# Patient Record
Sex: FEMALE | Race: WHITE | NOT HISPANIC OR LATINO | Employment: FULL TIME | URBAN - METROPOLITAN AREA
[De-identification: names, ages, dates, MRNs, and addresses within clinical notes are randomized per-mention and may not be internally consistent; named-entity substitution may affect disease eponyms.]

---

## 2022-02-24 ENCOUNTER — OFFICE VISIT (OUTPATIENT)
Dept: FAMILY MEDICINE CLINIC | Facility: CLINIC | Age: 47
End: 2022-02-24

## 2022-02-24 ENCOUNTER — LAB (OUTPATIENT)
Dept: LAB | Facility: HOSPITAL | Age: 47
End: 2022-02-24

## 2022-02-24 VITALS
WEIGHT: 223.4 LBS | BODY MASS INDEX: 35.06 KG/M2 | SYSTOLIC BLOOD PRESSURE: 124 MMHG | HEART RATE: 73 BPM | OXYGEN SATURATION: 95 % | HEIGHT: 67 IN | DIASTOLIC BLOOD PRESSURE: 78 MMHG | TEMPERATURE: 97.7 F

## 2022-02-24 DIAGNOSIS — J30.1 ALLERGIC RHINITIS DUE TO POLLEN, UNSPECIFIED SEASONALITY: ICD-10-CM

## 2022-02-24 DIAGNOSIS — I10 PRIMARY HYPERTENSION: ICD-10-CM

## 2022-02-24 DIAGNOSIS — E06.3 HYPOTHYROIDISM DUE TO HASHIMOTO'S THYROIDITIS: ICD-10-CM

## 2022-02-24 DIAGNOSIS — F41.9 ANXIETY: ICD-10-CM

## 2022-02-24 DIAGNOSIS — F33.41 RECURRENT MAJOR DEPRESSIVE DISORDER, IN PARTIAL REMISSION: ICD-10-CM

## 2022-02-24 DIAGNOSIS — Z12.12 ENCOUNTER FOR COLORECTAL CANCER SCREENING: ICD-10-CM

## 2022-02-24 DIAGNOSIS — K21.9 GASTROESOPHAGEAL REFLUX DISEASE WITHOUT ESOPHAGITIS: ICD-10-CM

## 2022-02-24 DIAGNOSIS — Z12.31 ENCOUNTER FOR SCREENING MAMMOGRAM FOR MALIGNANT NEOPLASM OF BREAST: ICD-10-CM

## 2022-02-24 DIAGNOSIS — E03.8 HYPOTHYROIDISM DUE TO HASHIMOTO'S THYROIDITIS: ICD-10-CM

## 2022-02-24 DIAGNOSIS — N93.9 ABNORMAL UTERINE BLEEDING: ICD-10-CM

## 2022-02-24 DIAGNOSIS — Z00.00 ANNUAL PHYSICAL EXAM: Primary | ICD-10-CM

## 2022-02-24 DIAGNOSIS — Z12.11 ENCOUNTER FOR COLORECTAL CANCER SCREENING: ICD-10-CM

## 2022-02-24 PROCEDURE — 99214 OFFICE O/P EST MOD 30 MIN: CPT | Performed by: FAMILY MEDICINE

## 2022-02-24 PROCEDURE — 99386 PREV VISIT NEW AGE 40-64: CPT | Performed by: FAMILY MEDICINE

## 2022-02-24 RX ORDER — HYDROCHLOROTHIAZIDE 12.5 MG/1
12.5 TABLET ORAL DAILY
Qty: 90 TABLET | Refills: 1 | Status: SHIPPED | OUTPATIENT
Start: 2022-02-24 | End: 2023-01-18

## 2022-02-24 RX ORDER — FEXOFENADINE HCL 180 MG/1
180 TABLET ORAL DAILY
Qty: 90 TABLET | Refills: 3 | Status: SHIPPED | OUTPATIENT
Start: 2022-02-24 | End: 2023-01-18

## 2022-02-24 RX ORDER — LORAZEPAM 0.5 MG/1
TABLET ORAL
COMMUNITY
End: 2022-02-24 | Stop reason: ALTCHOICE

## 2022-02-24 RX ORDER — LEVOTHYROXINE SODIUM 112 UG/1
112 TABLET ORAL DAILY
Qty: 90 TABLET | Refills: 3 | Status: SHIPPED | OUTPATIENT
Start: 2022-02-24 | End: 2023-01-18

## 2022-02-24 RX ORDER — FAMOTIDINE 20 MG/1
20 TABLET, FILM COATED ORAL 2 TIMES DAILY
Qty: 180 TABLET | Refills: 3 | Status: SHIPPED | OUTPATIENT
Start: 2022-02-24 | End: 2023-01-18

## 2022-02-24 RX ORDER — FEXOFENADINE HCL 180 MG/1
TABLET ORAL
COMMUNITY
End: 2022-02-24 | Stop reason: SDUPTHER

## 2022-02-24 RX ORDER — DULOXETIN HYDROCHLORIDE 20 MG/1
CAPSULE, DELAYED RELEASE ORAL
COMMUNITY
End: 2022-02-24 | Stop reason: SDUPTHER

## 2022-02-24 RX ORDER — TRANEXAMIC ACID 650 MG/1
TABLET ORAL
COMMUNITY
End: 2022-02-24 | Stop reason: SDUPTHER

## 2022-02-24 RX ORDER — TRANEXAMIC ACID 650 MG/1
TABLET ORAL
Qty: 20 TABLET | Refills: 2 | Status: SHIPPED | OUTPATIENT
Start: 2022-02-24 | End: 2023-01-18

## 2022-02-24 RX ORDER — FAMOTIDINE 20 MG/1
TABLET, FILM COATED ORAL
COMMUNITY
End: 2022-02-24 | Stop reason: SDUPTHER

## 2022-02-24 RX ORDER — DULOXETIN HYDROCHLORIDE 20 MG/1
40 CAPSULE, DELAYED RELEASE ORAL DAILY
Qty: 180 CAPSULE | Refills: 3 | Status: SHIPPED | OUTPATIENT
Start: 2022-02-24 | End: 2023-01-18

## 2022-02-24 NOTE — PROGRESS NOTES
Follow Up Office Visit      Patient Name: Jeannette Aviles  : 1975   MRN: [id number]     Chief Complaint:    Chief Complaint   Patient presents with   • Annual Exam   • Med Refill       History of Present Illness: Jeannette Aviles is a 47 y.o. female who is here today to follow up with hypothyroidism, anxiety, hypertension, lower extremity edema, abnormal uterine bleeding, GERD and allergies.    Patient has been on amlodipine for hypertension.  She has a history of lower extremity edema in the left lower leg.  Patient was advised today about the side effects of amlodipine worsening edema and she is okay with switching blood pressure medications today.  Her blood pressure has been controlled.    Patient's allergies are controlled on Allegra.  Patient's GERD is controlled on Pepcid.    Patient's thyroid status is unknown and we need to recheck it today.  Patient needs refill on her thyroid medication.  Patient previously followed up with OB/GYN for abnormal uterine bleeding and was placed on Lysteda.  Patient is wanting a refill today and also wanting to follow-up with gynecology and obstetrics.    Patient is doing well with Cymbalta for anxiety.  She reports that she used to be on Ativan and now she is only using it 4 times per year.  Patient was informed about the drug contract that we have to perform so that she can be on this medication 4 times a year.  Patient did not feel that it was necessary to follow-up every 3 months for this medication so at this time she would like to stop it.  In the future if we need to restart this medication she will follow-up with our office.  We deferred psychiatry consult as she is doing well on Cymbalta currently.  Patient says she increased to 40 mg and feels like she is doing well.    Patient has issues standing for long periods of time due to her the edema in her lower leg.  Patient is needing some work accommodations so that she can have time to sit down  and have time off when the edema worsens.  She has forms from [employer] where she works at.  Patient was advised to follow-up with [facility] for evaluation.    Annual exam: Patient presents needing annual exam as she has not had one recently.  We discussed screening labs, vaccines, mammograms, colorectal cancer screening.  We also discussed cervical cancer screening.  Patient feels safe at home.      Review of systems was positive for anxiety, lower extremity edema      Physical exam: Patient's mood and affect is appropriate.  Patient's left lower extremity showed 2+ pitting edema extending from ankle to mid tibial region.  Patient's heart and lung exam was normal without rales rhonchi's or murmurs.  Patient's joint exam did not show any edema/swelling.  Patient was in no acute distress.    Subjective        I have reviewed and the following portions of the patient's history were updated as appropriate: past family history, past medical history, past social history, past surgical history and problem list.    Medications:     Current Outpatient Medications:   •  DULoxetine (CYMBALTA) 20 MG capsule, Take 2 capsules by mouth Daily., Disp: 180 capsule, Rfl: 3  •  famotidine (PEPCID) 20 MG tablet, Take 1 tablet by mouth 2 (Two) Times a Day., Disp: 180 tablet, Rfl: 3  •  fexofenadine (ALLEGRA) 180 MG tablet, Take 1 tablet by mouth Daily., Disp: 90 tablet, Rfl: 3  •  levothyroxine (SYNTHROID), 112 mcg., Disp: , Rfl:   •  Tranexamic Acid (Lysteda) 650 MG tablet, Take 1300mg twice a day up to 5 times a month max, Disp: 20 tablet, Rfl: 2  •  hydroCHLOROthiazide (HYDRODIURIL) 12.5 MG tablet, Take 1 tablet by mouth Daily., Disp: 90 tablet, Rfl: 1  •  levothyroxine (Synthroid) 112 MCG tablet, Take 1 tablet by mouth Daily., Disp: 90 tablet, Rfl: 3    Allergies:   Allergies   Allergen Reactions   • Corticosteroids Hives and Swelling   • Latex Hives       Objective     Physical Exam: Please see above  Vital Signs:   Vitals:     "02/24/22 0840   BP: 124/78   Pulse: 73   Temp: 97.7 °F (36.5 °C)   TempSrc: Temporal   SpO2: 95%   Weight: 101 kg (223 lb 6.4 oz)   Height: 170.2 cm (67\")   PainSc:   2     Body mass index is 34.99 kg/m².          Assessment / Plan      Assessment/Plan:   Diagnoses and all orders for this visit:    1. Annual physical exam (Primary)  -     CBC & Differential; Future  -     Comprehensive Metabolic Panel; Future  -     Lipid Panel; Future  -     TSH Rfx On Abnormal To Free T4; Future  -     Hemoglobin A1c; Future    2. Anxiety  -     DULoxetine (CYMBALTA) 20 MG capsule; Take 2 capsules by mouth Daily.  Dispense: 180 capsule; Refill: 3    3. Hypothyroidism due to Hashimoto's thyroiditis  -     levothyroxine (Synthroid) 112 MCG tablet; Take 1 tablet by mouth Daily.  Dispense: 90 tablet; Refill: 3    4. Abnormal uterine bleeding  -     Ambulatory Referral to Gynecology  -     Tranexamic Acid (Lysteda) 650 MG tablet; Take 1300mg twice a day up to 5 times a month max  Dispense: 20 tablet; Refill: 2    5. Recurrent major depressive disorder, in partial remission (HCC)    6. Allergic rhinitis due to pollen, unspecified seasonality  -     fexofenadine (ALLEGRA) 180 MG tablet; Take 1 tablet by mouth Daily.  Dispense: 90 tablet; Refill: 3  -     famotidine (PEPCID) 20 MG tablet; Take 1 tablet by mouth 2 (Two) Times a Day.  Dispense: 180 tablet; Refill: 3    7. Gastroesophageal reflux disease without esophagitis    8. Primary hypertension  -     hydroCHLOROthiazide (HYDRODIURIL) 12.5 MG tablet; Take 1 tablet by mouth Daily.  Dispense: 90 tablet; Refill: 1    9. Encounter for colorectal cancer screening  -     Cologuard - Stool, Per Rectum; Future    10. Encounter for screening mammogram for malignant neoplasm of breast  -     Mammo Screening Digital Tomosynthesis Bilateral With CAD; Future    Annual counseling: Counseled patient regards to diet, exercise vaccines, Pap smear/cervical cancer screening, breast cancer screening and " colorectal cancer screening.  Patient was okay with Cologuard.  Patient requested mammogram.  Patient will follow up with gynecology for cervical cancer screening.      We will send patient to gynecology for further management of AUB.  We will order Cologuard.  Will order mammogram.    Stopping Ativan as patient is only using it 4 times per year.  If anxiety uncontrolled then would consider a different medication or send patient to psychiatry.    We will switch her to hydrochlorothiazide to improve edema and to make sure that amlodipine is not causing any other side effects.  Starting low-dose patient to call if blood pressure is above 130/80.      Patient to follow-up in 2 to 3 months to see how she is doing on hydrochlorothiazide.  At that point if patient is doing well repeat BMP in high follow-up every 6 months for medication management.    I attest that I have reviewed the student note and that the components of the history of the present illness, the physical exam, and the assessment and plan documented were performed by me or were performed in my presence by the student and verified by me.    Follow Up:   Return in about 3 months (around 5/24/2022).    Juan Luis Torre DO  [facility]

## 2022-03-25 ENCOUNTER — HOSPITAL ENCOUNTER (OUTPATIENT)
Dept: GENERAL RADIOLOGY | Facility: HOSPITAL | Age: 47
Discharge: HOME | End: 2022-03-25
Admitting: INTERNAL MEDICINE

## 2022-03-25 ENCOUNTER — TRANSCRIBE ORDERS (OUTPATIENT)
Dept: ADMINISTRATIVE | Facility: HOSPITAL | Age: 47
End: 2022-03-25

## 2022-03-25 DIAGNOSIS — M54.16 LUMBAR BACK PAIN WITH RADICULOPATHY AFFECTING LEFT LOWER EXTREMITY: ICD-10-CM

## 2022-03-25 DIAGNOSIS — M54.16 LUMBAR BACK PAIN WITH RADICULOPATHY AFFECTING LEFT LOWER EXTREMITY: Primary | ICD-10-CM

## 2022-03-25 PROCEDURE — 72110 X-RAY EXAM L-2 SPINE 4/>VWS: CPT

## 2022-03-28 ENCOUNTER — TRANSCRIBE ORDERS (OUTPATIENT)
Dept: ADMINISTRATIVE | Facility: HOSPITAL | Age: 47
End: 2022-03-28

## 2022-03-28 DIAGNOSIS — M51.26 DISC DISPLACEMENT, LUMBAR: Primary | ICD-10-CM

## 2022-04-15 ENCOUNTER — HOSPITAL ENCOUNTER (OUTPATIENT)
Dept: MRI IMAGING | Facility: HOSPITAL | Age: 47
Discharge: HOME | End: 2022-04-15
Admitting: INTERNAL MEDICINE

## 2022-04-15 DIAGNOSIS — M51.26 DISC DISPLACEMENT, LUMBAR: ICD-10-CM

## 2022-04-15 PROCEDURE — 72148 MRI LUMBAR SPINE W/O DYE: CPT

## 2022-12-16 ENCOUNTER — TRANSCRIBE ORDERS (OUTPATIENT)
Dept: ADMINISTRATIVE | Facility: HOSPITAL | Age: 47
End: 2022-12-16

## 2022-12-16 ENCOUNTER — HOSPITAL ENCOUNTER (OUTPATIENT)
Dept: GENERAL RADIOLOGY | Facility: HOSPITAL | Age: 47
Discharge: HOME | End: 2022-12-16
Admitting: INTERNAL MEDICINE

## 2022-12-16 DIAGNOSIS — S16.1XXA NECK STRAIN, INITIAL ENCOUNTER: ICD-10-CM

## 2022-12-16 DIAGNOSIS — S16.1XXA NECK STRAIN, INITIAL ENCOUNTER: Primary | ICD-10-CM

## 2022-12-16 PROCEDURE — 72052 X-RAY EXAM NECK SPINE 6/>VWS: CPT

## 2023-01-18 ENCOUNTER — OFFICE VISIT (OUTPATIENT)
Dept: FAMILY MEDICINE CLINIC | Facility: CLINIC | Age: 48
End: 2023-01-18
Payer: COMMERCIAL

## 2023-01-18 ENCOUNTER — PATIENT ROUNDING (BHMG ONLY) (OUTPATIENT)
Dept: FAMILY MEDICINE CLINIC | Facility: CLINIC | Age: 48
End: 2023-01-18
Payer: COMMERCIAL

## 2023-01-18 ENCOUNTER — LAB (OUTPATIENT)
Dept: LAB | Facility: HOSPITAL | Age: 48
End: 2023-01-18
Payer: COMMERCIAL

## 2023-01-18 VITALS
SYSTOLIC BLOOD PRESSURE: 130 MMHG | HEART RATE: 87 BPM | HEIGHT: 67 IN | OXYGEN SATURATION: 99 % | BODY MASS INDEX: 34.84 KG/M2 | WEIGHT: 222 LBS | DIASTOLIC BLOOD PRESSURE: 84 MMHG

## 2023-01-18 DIAGNOSIS — G56.01 CARPAL TUNNEL SYNDROME OF RIGHT WRIST: ICD-10-CM

## 2023-01-18 DIAGNOSIS — F32.A ANXIETY AND DEPRESSION: ICD-10-CM

## 2023-01-18 DIAGNOSIS — F41.9 ANXIETY AND DEPRESSION: Primary | ICD-10-CM

## 2023-01-18 DIAGNOSIS — F32.A ANXIETY AND DEPRESSION: Primary | ICD-10-CM

## 2023-01-18 DIAGNOSIS — Z82.69 FAMILY HISTORY OF SYSTEMIC LUPUS ERYTHEMATOSUS (SLE) IN MOTHER: ICD-10-CM

## 2023-01-18 DIAGNOSIS — E06.3 HYPOTHYROIDISM DUE TO HASHIMOTO'S THYROIDITIS: ICD-10-CM

## 2023-01-18 DIAGNOSIS — Z13.220 SCREENING FOR CHOLESTEROL LEVEL: ICD-10-CM

## 2023-01-18 DIAGNOSIS — L29.9 ITCHY SKIN: ICD-10-CM

## 2023-01-18 DIAGNOSIS — I10 PRIMARY HYPERTENSION: ICD-10-CM

## 2023-01-18 DIAGNOSIS — M19.90 ARTHRITIS: ICD-10-CM

## 2023-01-18 DIAGNOSIS — Z00.00 ENCOUNTER FOR MEDICAL EXAMINATION TO ESTABLISH CARE: ICD-10-CM

## 2023-01-18 DIAGNOSIS — F41.9 ANXIETY AND DEPRESSION: ICD-10-CM

## 2023-01-18 DIAGNOSIS — Z13.1 SCREENING FOR DIABETES MELLITUS: ICD-10-CM

## 2023-01-18 DIAGNOSIS — Z82.61 FAMILY HISTORY OF RHEUMATOID ARTHRITIS: ICD-10-CM

## 2023-01-18 DIAGNOSIS — E55.9 VITAMIN D DEFICIENCY: ICD-10-CM

## 2023-01-18 DIAGNOSIS — E03.8 HYPOTHYROIDISM DUE TO HASHIMOTO'S THYROIDITIS: ICD-10-CM

## 2023-01-18 DIAGNOSIS — Z12.11 SCREENING FOR COLON CANCER: ICD-10-CM

## 2023-01-18 DIAGNOSIS — Z01.419 ROUTINE GYNECOLOGICAL EXAMINATION: ICD-10-CM

## 2023-01-18 PROBLEM — F32.81 PMDD (PREMENSTRUAL DYSPHORIC DISORDER): Status: ACTIVE | Noted: 2023-01-18

## 2023-01-18 PROCEDURE — 83036 HEMOGLOBIN GLYCOSYLATED A1C: CPT

## 2023-01-18 PROCEDURE — 80050 GENERAL HEALTH PANEL: CPT

## 2023-01-18 PROCEDURE — 80061 LIPID PANEL: CPT

## 2023-01-18 PROCEDURE — 86038 ANTINUCLEAR ANTIBODIES: CPT

## 2023-01-18 PROCEDURE — 82306 VITAMIN D 25 HYDROXY: CPT

## 2023-01-18 PROCEDURE — 99214 OFFICE O/P EST MOD 30 MIN: CPT | Performed by: STUDENT IN AN ORGANIZED HEALTH CARE EDUCATION/TRAINING PROGRAM

## 2023-01-18 RX ORDER — ERGOCALCIFEROL 1.25 MG/1
CAPSULE ORAL
COMMUNITY
Start: 2022-12-06 | End: 2023-03-08 | Stop reason: SDUPTHER

## 2023-01-18 RX ORDER — FEXOFENADINE HCL 180 MG/1
180 TABLET ORAL 2 TIMES DAILY
Qty: 60 TABLET | Refills: 5 | Status: SHIPPED | OUTPATIENT
Start: 2023-01-18 | End: 2023-03-08 | Stop reason: SDUPTHER

## 2023-01-18 RX ORDER — LOSARTAN POTASSIUM 50 MG/1
50 TABLET ORAL DAILY
Qty: 30 TABLET | Refills: 5 | Status: SHIPPED | OUTPATIENT
Start: 2023-01-18 | End: 2023-02-02 | Stop reason: SDUPTHER

## 2023-01-18 RX ORDER — FAMOTIDINE 40 MG/1
40 TABLET, FILM COATED ORAL 2 TIMES DAILY
Qty: 60 TABLET | Refills: 5 | Status: SHIPPED | OUTPATIENT
Start: 2023-01-18 | End: 2023-03-08 | Stop reason: SDUPTHER

## 2023-01-18 RX ORDER — SPIRONOLACTONE 25 MG
20 TABLET ORAL DAILY
COMMUNITY
Start: 2023-01-01

## 2023-01-18 RX ORDER — LEVOTHYROXINE SODIUM 112 UG/1
1 CAPSULE ORAL DAILY
COMMUNITY
Start: 2022-03-17 | End: 2023-01-18 | Stop reason: SDUPTHER

## 2023-01-18 RX ORDER — FAMOTIDINE 40 MG/1
40 TABLET, FILM COATED ORAL 2 TIMES DAILY
COMMUNITY
Start: 2020-11-01 | End: 2023-01-18 | Stop reason: SDUPTHER

## 2023-01-18 RX ORDER — FEXOFENADINE HCL 180 MG/1
TABLET ORAL 2 TIMES DAILY
COMMUNITY
Start: 2020-11-01 | End: 2023-01-18 | Stop reason: SDUPTHER

## 2023-01-18 RX ORDER — LOSARTAN POTASSIUM 50 MG/1
TABLET ORAL DAILY
COMMUNITY
Start: 2022-03-17 | End: 2023-01-18 | Stop reason: SDUPTHER

## 2023-01-18 RX ORDER — DULOXETIN HYDROCHLORIDE 60 MG/1
CAPSULE, DELAYED RELEASE ORAL
COMMUNITY
Start: 2022-11-28 | End: 2023-01-18 | Stop reason: SDUPTHER

## 2023-01-18 RX ORDER — DULOXETIN HYDROCHLORIDE 60 MG/1
60 CAPSULE, DELAYED RELEASE ORAL DAILY
Qty: 30 CAPSULE | Refills: 5 | Status: SHIPPED | OUTPATIENT
Start: 2023-01-18 | End: 2023-03-08 | Stop reason: SDUPTHER

## 2023-01-18 RX ORDER — LEVOTHYROXINE SODIUM 112 UG/1
112 CAPSULE ORAL DAILY
Qty: 30 CAPSULE | Refills: 5 | Status: SHIPPED | OUTPATIENT
Start: 2023-01-18 | End: 2023-03-08 | Stop reason: SDUPTHER

## 2023-01-18 NOTE — PROGRESS NOTES
New Patient Office Visit      Patient Name: Jeannette Aviles  : 1975   MRN: [id number]   Care Team: Patient Care Team:  Adelaide Garcia DO as PCP - General (Internal Medicine)    Chief Complaint:    Chief Complaint   Patient presents with   • new patient preventative medicine service       History of Present Illness: Jeannette Aviles is a 47 y.o. female who is here today to establish care. She is  with one child. She moved from [place] to [place] last year. She is working nights at [employer].     She previously followed with Dr. Mcneil at [facility]".     Anxiety/Depression - taking cymbalta 60mg daily which helps with mood. She also reports this helps with nerve pain related to carpal tunnel and cupital tunnel syndrome in right hand. She has followed with hand specialist in [place] in the past. Has never had surgical intervention. She reports 3,4,5 digits numb without weakness. She had NCS in [place].     She reports chronic itchy skin which she followed with allergist and was prescribed  famotidine 40mg BID and allegra 180mg BID.     Hashimotos, Hypothyroidism - has been taking tirosint 112mcg. She had her labs done about 4-5 months ago.     HTN - taking losartan 50mg daily and this controlled BP well.     She gets eye exams yearly     She is perimenopausal - had not had menstrual cycle for 6 months until last month. She believes her last pap was about 4-5 years ago. Wants referral to GYN.     Family History - mother has RA, hypothyroid and SLE     Subjective      Review of Systems:   Review of Systems - See HPI    Past Medical History:   Past Medical History:   Diagnosis Date   • Allergic 2019    latex, topical corticosteroids, sugar substitutes   • Anxiety    • Arthritis     lumbar spine, right big toe/ hallux rigidus   • Depression    • Headache    • History of medical problems ,     PMDD, pelvic floor weakness   • Hypertension    • Hypothyroidism     Hashimoto's  Disease   • Low back pain    • Neuromuscular disorder (HCC)     cubital and carpal tunnel symptoms   • Obesity        Past Surgical History: History reviewed. No pertinent surgical history.    Family History:   Family History   Problem Relation Age of Onset   • Anxiety disorder Father    • Heart disease Father         heart attack at 69yo   • Hyperlipidemia Father         since his 20s   • Kidney disease Father         I don't think we knew it until after the heart attack   • Stroke Father         several minor strokes in his 60s   • Thyroid disease Father         hyperthyroidism   • Anxiety disorder Son         also autistic   • Depression Son    • Developmental Disability Son         autism   • Liver disease Son         NAFLD/ BECK at 20 years old   • Mental illness Son         are we counting autism here?   • Arthritis Mother         RA and Lupus/SLE   • Depression Mother    • Thyroid disease Mother         hypothyroidism   • Cancer Maternal Grandfather         lifelong smoker, lung cancer   • Cancer Paternal Grandmother         breast cancer- her whole family had cancer of some type   • Early death Paternal Grandmother         breast cancer at 58 years old   • COPD Maternal Grandmother         lifelong smoker   • Diabetes Paternal Aunt    • Heart disease Paternal Aunt         fatal heart attack at 69yo, had had previous (I think a stent?)   • Thyroid disease Paternal Aunt         hypo, I think   • Other Paternal Aunt         endometriosis   • Early death Maternal Uncle         car accident at 18- I know, not a health issue, but you asked.   • Liver disease Paternal Grandfather          at 68   • Stroke Paternal Grandfather         sever impairment afterwards, had to live in care facility   • Thyroid disease Maternal Aunt         hypothyroidism       Social History:   Social History     Socioeconomic History   • Marital status:    Tobacco Use   • Smoking status: Never   • Smokeless tobacco:  "Never   • Tobacco comments:     Strongly anti-smoker   Vaping Use   • Vaping Use: Never used   Substance and Sexual Activity   • Alcohol use: Not Currently     Comment: maybe 5 times a year, not much to speak of really   • Drug use: Never   • Sexual activity: Yes     Partners: Male     Birth control/protection: None     Comment: secondary infertility, miscarriage       Tobacco History:   Social History     Tobacco Use   Smoking Status Never   Smokeless Tobacco Never   Tobacco Comments    Strongly anti-smoker       Medications:     Current Outpatient Medications:   •  DULoxetine (CYMBALTA) 60 MG capsule, Take 1 capsule by mouth Daily., Disp: 30 capsule, Rfl: 5  •  famotidine (PEPCID) 40 MG tablet, Take 1 tablet by mouth 2 (Two) Times a Day., Disp: 60 tablet, Rfl: 5  •  fexofenadine (ALLEGRA) 180 MG tablet, Take 1 tablet by mouth 2 (Two) Times a Day., Disp: 60 tablet, Rfl: 5  •  levothyroxine sodium (TIROSINT) 112 MCG capsule, Take 1 capsule by mouth Daily., Disp: 30 capsule, Rfl: 5  •  losartan (COZAAR) 50 MG tablet, Take 1 tablet by mouth Daily., Disp: 30 tablet, Rfl: 5  •  Lutein 20 MG capsule, Take 20 mg by mouth Daily., Disp: , Rfl:   •  vitamin D (ERGOCALCIFEROL) 1.25 MG (53137 UT) capsule capsule, , Disp: , Rfl:     Allergies:   Allergies   Allergen Reactions   • Aspartame Unknown - High Severity   • Buspirone Dizziness   • Corticosteroids Hives and Swelling   • Latex Hives       Objective     Physical Exam:  Vital Signs:   Vitals:    01/18/23 1336   BP: 130/84   Pulse: 87   SpO2: 99%   Weight: 101 kg (222 lb)   Height: 170.2 cm (67\")     Body mass index is 34.77 kg/m².     Physical Exam  Vitals reviewed.   Constitutional:       Appearance: Normal appearance. She is not ill-appearing.   Neck:      Comments: Normal thyroid size, no nodules  Cardiovascular:      Rate and Rhythm: Normal rate and regular rhythm.      Heart sounds: Normal heart sounds. No murmur heard.  Pulmonary:      Effort: Pulmonary effort is " normal. No respiratory distress.   Musculoskeletal:      Cervical back: Normal range of motion and neck supple.   Skin:     General: Skin is warm and dry.      Comments: Scattered excoriations on left forearm   Neurological:      Mental Status: She is alert.   Psychiatric:         Mood and Affect: Mood normal.         Behavior: Behavior normal.         Judgment: Judgment normal.         Assessment / Plan      Assessment/Plan:   Problems Addressed This Visit  Diagnoses and all orders for this visit:    1. Anxiety and depression (Primary)  -     DULoxetine (CYMBALTA) 60 MG capsule; Take 1 capsule by mouth Daily.  Dispense: 30 capsule; Refill: 5  -     CBC (No Diff); Future  -     Lipid Panel; Future  -     Comprehensive Metabolic Panel; Future  -     TSH Rfx On Abnormal To Free T4; Future  Well controled with current cymbalta 60mg daily, refilled today    2. Carpal tunnel syndrome of right wrist  -     DULoxetine (CYMBALTA) 60 MG capsule; Take 1 capsule by mouth Daily.  Dispense: 30 capsule; Refill: 5    3. Itchy skin  -     fexofenadine (ALLEGRA) 180 MG tablet; Take 1 tablet by mouth 2 (Two) Times a Day.  Dispense: 60 tablet; Refill: 5  -     famotidine (PEPCID) 40 MG tablet; Take 1 tablet by mouth 2 (Two) Times a Day.  Dispense: 60 tablet; Refill: 5    4. Hypothyroidism due to Hashimoto's thyroiditis  -     levothyroxine sodium (TIROSINT) 112 MCG capsule; Take 1 capsule by mouth Daily.  Dispense: 30 capsule; Refill: 5  -     MAGNUS by IFA, Reflex 9-biomarkers profile; Future  -     CBC (No Diff); Future  -     Lipid Panel; Future  -     Hemoglobin A1c; Future  -     Comprehensive Metabolic Panel; Future  -     TSH Rfx On Abnormal To Free T4; Future  Due for labs today   Continue current levothyroxine 112mcg daily     5. Vitamin D deficiency  -     Vitamin D 25 hydroxy; Future    6. Routine gynecological examination  -     Ambulatory Referral to Gynecology    7. Primary hypertension  -     losartan (COZAAR) 50 MG  tablet; Take 1 tablet by mouth Daily.  Dispense: 30 tablet; Refill: 5  -     CBC (No Diff); Future  -     Lipid Panel; Future  -     Hemoglobin A1c; Future  -     Comprehensive Metabolic Panel; Future  -     TSH Rfx On Abnormal To Free T4; Future  Well controlled with losartan 50mg daily, continue without changes    8. Encounter for medical examination to establish care  -     CBC (No Diff); Future  -     Lipid Panel; Future  -     Hemoglobin A1c; Future  -     Comprehensive Metabolic Panel; Future  -     TSH Rfx On Abnormal To Free T4; Future  Discussed importance of preventative care including vaccinations, age appropriate cancer screening, routine lab work, healthy diet, and active lifestyle.  Cologuard ordered today  Routine labs today  Due for pap and routine gyn exam - referred to gyn    9. Screening for colon cancer  -     Cologuard - Stool, Per Rectum; Future    10. Family history of systemic lupus erythematosus (SLE) in mother  -     MAGNUS by IFA, Reflex 9-biomarkers profile; Future    11. Family history of rheumatoid arthritis  -     MAGNUS by IFA, Reflex 9-biomarkers profile; Future    12. Arthritis  -     MAGNUS by IFA, Reflex 9-biomarkers profile; Future    13. Screening for diabetes mellitus  -     Hemoglobin A1c; Future    14. Screening for cholesterol level  -     Lipid Panel; Future          Plan of care reviewed with patient at the conclusion of today's visit. Education was provided regarding diagnosis and management.  Patient verbalizes understanding of and agreement with management plan.      Follow Up:   Return in about 6 months (around 7/18/2023) for Annual.          DO RADHA Torres RD  [facility]  [address]  [facility] [address]  Fax [phone]  Phone [phone]

## 2023-01-19 LAB
25(OH)D3 SERPL-MCNC: 28.7 NG/ML (ref 30–100)
ALBUMIN SERPL-MCNC: 4.7 G/DL (ref 3.5–5.2)
ALBUMIN/GLOB SERPL: 1.4 G/DL
ALP SERPL-CCNC: 93 U/L (ref 39–117)
ALT SERPL W P-5'-P-CCNC: 16 U/L (ref 1–33)
ANION GAP SERPL CALCULATED.3IONS-SCNC: 6.1 MMOL/L (ref 5–15)
AST SERPL-CCNC: 16 U/L (ref 1–32)
BILIRUB SERPL-MCNC: 0.3 MG/DL (ref 0–1.2)
BUN SERPL-MCNC: 12 MG/DL (ref 6–20)
BUN/CREAT SERPL: 14 (ref 7–25)
CALCIUM SPEC-SCNC: 10 MG/DL (ref 8.6–10.5)
CHLORIDE SERPL-SCNC: 101 MMOL/L (ref 98–107)
CHOLEST SERPL-MCNC: 219 MG/DL (ref 0–200)
CO2 SERPL-SCNC: 32.9 MMOL/L (ref 22–29)
CREAT SERPL-MCNC: 0.86 MG/DL (ref 0.57–1)
DEPRECATED RDW RBC AUTO: 37.9 FL (ref 37–54)
EGFRCR SERPLBLD CKD-EPI 2021: 84.5 ML/MIN/1.73
ERYTHROCYTE [DISTWIDTH] IN BLOOD BY AUTOMATED COUNT: 12.6 % (ref 12.3–15.4)
GLOBULIN UR ELPH-MCNC: 3.3 GM/DL
GLUCOSE SERPL-MCNC: 84 MG/DL (ref 65–99)
HBA1C MFR BLD: 5.3 % (ref 4.8–5.6)
HCT VFR BLD AUTO: 42.9 % (ref 34–46.6)
HDLC SERPL-MCNC: 52 MG/DL (ref 40–60)
HGB BLD-MCNC: 14.4 G/DL (ref 12–15.9)
LDLC SERPL CALC-MCNC: 151 MG/DL (ref 0–100)
LDLC/HDLC SERPL: 2.87 {RATIO}
MCH RBC QN AUTO: 28.4 PG (ref 26.6–33)
MCHC RBC AUTO-ENTMCNC: 33.6 G/DL (ref 31.5–35.7)
MCV RBC AUTO: 84.6 FL (ref 79–97)
PLATELET # BLD AUTO: 164 10*3/MM3 (ref 140–450)
PMV BLD AUTO: 10.9 FL (ref 6–12)
POTASSIUM SERPL-SCNC: 3.9 MMOL/L (ref 3.5–5.2)
PROT SERPL-MCNC: 8 G/DL (ref 6–8.5)
RBC # BLD AUTO: 5.07 10*6/MM3 (ref 3.77–5.28)
SODIUM SERPL-SCNC: 140 MMOL/L (ref 136–145)
TRIGL SERPL-MCNC: 88 MG/DL (ref 0–150)
TSH SERPL DL<=0.05 MIU/L-ACNC: 2.6 UIU/ML (ref 0.27–4.2)
VLDLC SERPL-MCNC: 16 MG/DL (ref 5–40)
WBC NRBC COR # BLD: 7.35 10*3/MM3 (ref 3.4–10.8)

## 2023-01-20 LAB
ANA SER QL IF: NEGATIVE
LABORATORY COMMENT REPORT: NORMAL

## 2023-02-02 ENCOUNTER — OFFICE VISIT (OUTPATIENT)
Dept: FAMILY MEDICINE CLINIC | Facility: CLINIC | Age: 48
End: 2023-02-02
Payer: COMMERCIAL

## 2023-02-02 ENCOUNTER — PRIOR AUTHORIZATION (OUTPATIENT)
Dept: FAMILY MEDICINE CLINIC | Facility: CLINIC | Age: 48
End: 2023-02-02
Payer: COMMERCIAL

## 2023-02-02 VITALS
SYSTOLIC BLOOD PRESSURE: 132 MMHG | DIASTOLIC BLOOD PRESSURE: 96 MMHG | WEIGHT: 218 LBS | HEIGHT: 67 IN | BODY MASS INDEX: 34.21 KG/M2 | OXYGEN SATURATION: 98 % | HEART RATE: 69 BPM

## 2023-02-02 DIAGNOSIS — I10 PRIMARY HYPERTENSION: ICD-10-CM

## 2023-02-02 DIAGNOSIS — I10 UNCONTROLLED HYPERTENSION: ICD-10-CM

## 2023-02-02 DIAGNOSIS — L29.9 ITCHY SKIN: ICD-10-CM

## 2023-02-02 DIAGNOSIS — G43.009 MIGRAINE WITHOUT AURA AND WITHOUT STATUS MIGRAINOSUS, NOT INTRACTABLE: Primary | ICD-10-CM

## 2023-02-02 PROCEDURE — 99214 OFFICE O/P EST MOD 30 MIN: CPT | Performed by: STUDENT IN AN ORGANIZED HEALTH CARE EDUCATION/TRAINING PROGRAM

## 2023-02-02 RX ORDER — LOSARTAN POTASSIUM 100 MG/1
100 TABLET ORAL DAILY
Qty: 30 TABLET | Refills: 5 | Status: SHIPPED | OUTPATIENT
Start: 2023-02-02 | End: 2023-03-08 | Stop reason: SDUPTHER

## 2023-02-02 RX ORDER — RIMEGEPANT SULFATE 75 MG/75MG
1 TABLET, ORALLY DISINTEGRATING ORAL
Qty: 8 TABLET | Refills: 0 | Status: SHIPPED | OUTPATIENT
Start: 2023-02-02

## 2023-02-02 NOTE — PROGRESS NOTES
Established Office Visit      Patient Name: Jeannette Aviles  : 1975   MRN: [id number]   Care Team: Patient Care Team:  Adelaide Garcia DO as PCP - General (Internal Medicine)    Chief Complaint:    Chief Complaint   Patient presents with   • Hypertension       History of Present Illness: Jeannette Aviles is a 48 y.o. female with anxiety/depression, dermatitis, hypothyroidism, HTN who is here today to discuss HTN    Patient reports 2 weeks ago experiencing diffuse itchiness after being out of her allegra. She was extremely itchy. She used generic benadryl which helped with itchiness. The next day she developed headache and extreme fatigue. She checked her BP and it was 146/104 and 151/113. She continued to check her BP several times over the next few days and had persistently elevated readings. She experiences a small amount of dizziness and mild nausea with this. No chest pain, dyspnea, palpitations, syncope. The headache was unilateral and felt like prior migraine. She typically has migraine with aura but denies aura with this. She took medical leave of absence during this and her work requires paperwork in order to return.  She has tried taking triptan in the past but reports allergy to this.    BP today is slightly elevated as well at 132/96    Subjective      Review of Systems:   Review of Systems - See HPI    I have reviewed and the following portions of the patient's history were updated as appropriate: past family history, past medical history, past social history, past surgical history and problem list.    Medications:     Current Outpatient Medications:   •  DULoxetine (CYMBALTA) 60 MG capsule, Take 1 capsule by mouth Daily., Disp: 30 capsule, Rfl: 5  •  famotidine (PEPCID) 40 MG tablet, Take 1 tablet by mouth 2 (Two) Times a Day., Disp: 60 tablet, Rfl: 5  •  fexofenadine (ALLEGRA) 180 MG tablet, Take 1 tablet by mouth 2 (Two) Times a Day., Disp: 60 tablet, Rfl: 5  •  levothyroxine  "sodium (TIROSINT) 112 MCG capsule, Take 1 capsule by mouth Daily., Disp: 30 capsule, Rfl: 5  •  losartan (COZAAR) 100 MG tablet, Take 1 tablet by mouth Daily., Disp: 30 tablet, Rfl: 5  •  Lutein 20 MG capsule, Take 20 mg by mouth Daily., Disp: , Rfl:   •  vitamin D (ERGOCALCIFEROL) 1.25 MG (58138 UT) capsule capsule, , Disp: , Rfl:   •  Rimegepant Sulfate (Nurtec) 75 MG tablet dispersible tablet, Take 1 tablet by mouth Every Other Day As Needed (migraine). Indications: Migraine Headache, Disp: 8 tablet, Rfl: 0    Allergies:   Allergies   Allergen Reactions   • Aspartame Unknown - High Severity   • Buspirone Dizziness   • Corticosteroids Hives and Swelling   • Latex Hives       Objective     Physical Exam:  Vital Signs:   Vitals:    02/02/23 1333   BP: 132/96   Pulse: 69   SpO2: 98%   Weight: 98.9 kg (218 lb)   Height: 170.2 cm (67\")     Body mass index is 34.14 kg/m².     Physical Exam  Vitals reviewed.   Constitutional:       Appearance: Normal appearance. She is not ill-appearing.   Cardiovascular:      Rate and Rhythm: Normal rate.   Pulmonary:      Effort: Pulmonary effort is normal. No respiratory distress.   Skin:     General: Skin is warm and dry.   Neurological:      General: No focal deficit present.      Mental Status: She is alert. Mental status is at baseline.   Psychiatric:         Mood and Affect: Mood normal.         Behavior: Behavior normal.         Judgment: Judgment normal.         Assessment / Plan      Assessment/Plan:   Problems Addressed This Visit  Diagnoses and all orders for this visit:    1. Migraine without aura and without status migrainosus, not intractable (Primary)  -     Rimegepant Sulfate (Nurtec) 75 MG tablet dispersible tablet; Take 1 tablet by mouth Every Other Day As Needed (migraine). Indications: Migraine Headache  Dispense: 8 tablet; Refill: 0    2. Uncontrolled hypertension    3. Primary hypertension  -     losartan (COZAAR) 100 MG tablet; Take 1 tablet by mouth Daily.  " Dispense: 30 tablet; Refill: 5    4. Itchy skin      Her symptoms are consistent with migraine episode  She has had intolerable allergy to triptan in the past - will provide rx for nurtec prn abortive therapy. Discussed importance of sleeping habits, remaining hydrating, and optimizing HTN to avoid migraine flares.    HTN uncontrolled - will increase losartan from 50mg to 100mg daily. Continue to monitor at home and will let me know if persistently >130/80    Continue Allegra BID for itchy skin      Plan of care reviewed with patient at the conclusion of today's visit. Education was provided regarding diagnosis and management.  Patient verbalizes understanding of and agreement with management plan.    Follow Up:   No follow-ups on file.        DO RADHA Torres RD  [facility]  [address]  [facility] [address]  Fax [phone]  Phone [phone]

## 2023-02-07 NOTE — TELEPHONE ENCOUNTER
PA denied, determination letter in chart.    Trial/failure to 2 drugs used to treat migraines such as almotriptan,eletriptan, frovatriptan, rizatriptan, sumatriptan, naratriptan, & zolmitriptan

## 2023-02-09 DIAGNOSIS — R19.5 POSITIVE COLORECTAL CANCER SCREENING USING COLOGUARD TEST: Primary | ICD-10-CM

## 2023-02-09 DIAGNOSIS — Z12.11 COLON CANCER SCREENING: ICD-10-CM

## 2023-02-12 ENCOUNTER — PATIENT MESSAGE (OUTPATIENT)
Dept: FAMILY MEDICINE CLINIC | Facility: CLINIC | Age: 48
End: 2023-02-12
Payer: COMMERCIAL

## 2023-03-01 ENCOUNTER — PATIENT MESSAGE (OUTPATIENT)
Dept: FAMILY MEDICINE CLINIC | Facility: CLINIC | Age: 48
End: 2023-03-01
Payer: COMMERCIAL

## 2023-03-01 DIAGNOSIS — Z12.31 ENCOUNTER FOR SCREENING MAMMOGRAM FOR MALIGNANT NEOPLASM OF BREAST: Primary | ICD-10-CM

## 2023-03-08 ENCOUNTER — PATIENT MESSAGE (OUTPATIENT)
Dept: FAMILY MEDICINE CLINIC | Facility: CLINIC | Age: 48
End: 2023-03-08
Payer: COMMERCIAL

## 2023-03-08 DIAGNOSIS — I10 PRIMARY HYPERTENSION: ICD-10-CM

## 2023-03-08 DIAGNOSIS — F41.9 ANXIETY AND DEPRESSION: ICD-10-CM

## 2023-03-08 DIAGNOSIS — L29.9 ITCHY SKIN: ICD-10-CM

## 2023-03-08 DIAGNOSIS — E03.8 HYPOTHYROIDISM DUE TO HASHIMOTO'S THYROIDITIS: ICD-10-CM

## 2023-03-08 DIAGNOSIS — E06.3 HYPOTHYROIDISM DUE TO HASHIMOTO'S THYROIDITIS: ICD-10-CM

## 2023-03-08 DIAGNOSIS — F32.A ANXIETY AND DEPRESSION: ICD-10-CM

## 2023-03-08 DIAGNOSIS — E55.9 VITAMIN D DEFICIENCY: Primary | ICD-10-CM

## 2023-03-08 DIAGNOSIS — G56.01 CARPAL TUNNEL SYNDROME OF RIGHT WRIST: ICD-10-CM

## 2023-03-08 RX ORDER — DULOXETIN HYDROCHLORIDE 60 MG/1
60 CAPSULE, DELAYED RELEASE ORAL DAILY
Qty: 30 CAPSULE | Refills: 5 | Status: SHIPPED | OUTPATIENT
Start: 2023-03-08

## 2023-03-08 RX ORDER — FEXOFENADINE HCL 180 MG/1
180 TABLET ORAL 2 TIMES DAILY
Qty: 60 TABLET | Refills: 5 | Status: SHIPPED | OUTPATIENT
Start: 2023-03-08

## 2023-03-08 RX ORDER — ERGOCALCIFEROL 1.25 MG/1
CAPSULE ORAL
Qty: 12 CAPSULE | Refills: 0 | Status: SHIPPED | OUTPATIENT
Start: 2023-03-08

## 2023-03-08 RX ORDER — LOSARTAN POTASSIUM 100 MG/1
100 TABLET ORAL DAILY
Qty: 30 TABLET | Refills: 5 | Status: SHIPPED | OUTPATIENT
Start: 2023-03-08

## 2023-03-08 RX ORDER — LEVOTHYROXINE SODIUM 112 UG/1
112 CAPSULE ORAL DAILY
Qty: 30 CAPSULE | Refills: 5 | Status: SHIPPED | OUTPATIENT
Start: 2023-03-08

## 2023-03-08 RX ORDER — FAMOTIDINE 40 MG/1
40 TABLET, FILM COATED ORAL 2 TIMES DAILY
Qty: 60 TABLET | Refills: 5 | Status: SHIPPED | OUTPATIENT
Start: 2023-03-08

## 2023-03-08 NOTE — TELEPHONE ENCOUNTER
Rx Refill Note  Requested Prescriptions      No prescriptions requested or ordered in this encounter      Last office visit with prescribing clinician: 2/2/2023   Last telemedicine visit with prescribing clinician: 7/21/2023   Next office visit with prescribing clinician: 7/21/2023                         Would you like a call back once the refill request has been completed: [] Yes [] No    If the office needs to give you a call back, can they leave a voicemail: [] Yes [] No    Rossi Kelly MA  03/08/23, 09:27 EST

## 2023-03-08 NOTE — TELEPHONE ENCOUNTER
From: Jeannette Aviles  To: Adelaide Garcia  Sent: 3/8/2023 7:54 AM EST  Subject: Can you please send in prescriptions    Hi, the prescriptions I have from my previous doctor are running out. Can you please send in new prescriptions to the [facility] on [address]?     My medical records likely show this, but for the sake of double-checking, I currently take the following :    (1) levothyroxine 112mcg (once daily; 30 total)  (2) losartan potassium 100mg (once daily, 30 total)  (3) famotidine 40mg (2 per day, 60 total)  (4) duloxetine 60mg (once daily, 30 total)  (5) fexofenadine 180mg (2 per day, 60 total)  *(6) ergocalciferol/ D2 50,000IU (once per week/ 4 total) *although I am open to another amount/ dosage schedule, since my labs show I am still low on Vitamin D, despite treating for nearly a year. Would it make a difference to try a D3? Or to take a smaller amount daily?    Thank you!  -Jeannette Aviles

## 2023-04-05 DIAGNOSIS — Z23 IMMUNIZATION DUE: Primary | ICD-10-CM

## 2023-04-07 ENCOUNTER — HOSPITAL ENCOUNTER (OUTPATIENT)
Dept: MAMMOGRAPHY | Facility: HOSPITAL | Age: 48
Discharge: HOME | End: 2023-04-07
Payer: COMMERCIAL

## 2023-04-07 ENCOUNTER — APPOINTMENT (OUTPATIENT)
Dept: OTHER | Facility: HOSPITAL | Age: 48
End: 2023-04-07
Payer: COMMERCIAL

## 2023-04-07 DIAGNOSIS — Z92.89 HISTORY OF MAMMOGRAM: ICD-10-CM

## 2023-04-07 DIAGNOSIS — Z12.31 ENCOUNTER FOR SCREENING MAMMOGRAM FOR MALIGNANT NEOPLASM OF BREAST: ICD-10-CM

## 2023-04-07 PROCEDURE — 77063 BREAST TOMOSYNTHESIS BI: CPT | Performed by: RADIOLOGY

## 2023-04-07 PROCEDURE — 77063 BREAST TOMOSYNTHESIS BI: CPT

## 2023-04-07 PROCEDURE — 77067 SCR MAMMO BI INCL CAD: CPT | Performed by: RADIOLOGY

## 2023-04-07 PROCEDURE — 77067 SCR MAMMO BI INCL CAD: CPT

## 2023-04-12 ENCOUNTER — OFFICE VISIT (OUTPATIENT)
Dept: FAMILY MEDICINE CLINIC | Facility: CLINIC | Age: 48
End: 2023-04-12
Payer: COMMERCIAL

## 2023-04-12 VITALS
HEIGHT: 67 IN | OXYGEN SATURATION: 99 % | HEART RATE: 72 BPM | SYSTOLIC BLOOD PRESSURE: 130 MMHG | WEIGHT: 219 LBS | DIASTOLIC BLOOD PRESSURE: 90 MMHG | BODY MASS INDEX: 34.37 KG/M2

## 2023-04-12 DIAGNOSIS — Z23 IMMUNIZATION DUE: ICD-10-CM

## 2023-04-12 DIAGNOSIS — M54.50 CHRONIC LEFT-SIDED LOW BACK PAIN WITHOUT SCIATICA: Primary | ICD-10-CM

## 2023-04-12 DIAGNOSIS — M19.071 PRIMARY OSTEOARTHRITIS OF RIGHT FOOT: ICD-10-CM

## 2023-04-12 DIAGNOSIS — M54.2 CHRONIC NECK PAIN: ICD-10-CM

## 2023-04-12 DIAGNOSIS — M54.2 CERVICALGIA: ICD-10-CM

## 2023-04-12 DIAGNOSIS — G89.29 CHRONIC NECK PAIN: ICD-10-CM

## 2023-04-12 DIAGNOSIS — G89.29 CHRONIC LEFT-SIDED LOW BACK PAIN WITHOUT SCIATICA: Primary | ICD-10-CM

## 2023-04-12 DIAGNOSIS — L98.9 SKIN LESION: ICD-10-CM

## 2023-04-12 RX ORDER — MELOXICAM 7.5 MG/1
7.5 TABLET ORAL DAILY PRN
Qty: 30 TABLET | Refills: 1 | Status: SHIPPED | OUTPATIENT
Start: 2023-04-12

## 2023-04-12 RX ORDER — METAXALONE 400 MG/1
400 TABLET ORAL 3 TIMES DAILY PRN
Qty: 90 TABLET | Refills: 0 | Status: SHIPPED | OUTPATIENT
Start: 2023-04-12

## 2023-04-12 NOTE — PROGRESS NOTES
Established Office Visit      Patient Name: Jeannette Aviles  : 1975   MRN: [id number]   Care Team: Patient Care Team:  Adelaide Garcia DO as PCP - General (Internal Medicine)    Chief Complaint:    Chief Complaint   Patient presents with   • Back Pain       History of Present Illness: Jeannette Aviles is a 48 y.o. female with anxiety/depression, dermatitis, hypothyroidism, HTN, migraine without aura, chronic low back pain 2/2 lumbar DDD who is here today to follow up with     She feels left sided neck tightness that radiates into her shoulder. Denies significant pain but feeling very stiff. She has had imaging of her cervical spine after workers comp injury several months ago. Over the past few months she has been participating with PT and was told she exhausted her options there, encouraged her to continue home exercises which she has. She is using blue emu and volteran gel prn which somewhat helps. She has tried flexeril and robaxin but these caused daytime drowsiness.     Reports chronic low back pain ongoing for 20 years after picking child up wrong and it flares every so often. Feels left sided tightness with some radiation intermittently into her buttock but not beyond this.   She has OA in right great toe which causes discomfort and she overcompensates for this. She  Wears custom insoles and follows with [facility] for this.     She has concerns for skin rash/lesion that comes and goes. Reports lesions pop up, skin thins, bleeds, and heals within 4-5 days. Occurs on forearms.       Subjective      Review of Systems:   Review of Systems - See HPI    I have reviewed and the following portions of the patient's history were updated as appropriate: past family history, past medical history, past social history, past surgical history and problem list.    Medications:     Current Outpatient Medications:   •  DULoxetine (CYMBALTA) 60 MG capsule, Take 1 capsule by mouth Daily., Disp: 30  "capsule, Rfl: 5  •  famotidine (PEPCID) 40 MG tablet, Take 1 tablet by mouth 2 (Two) Times a Day., Disp: 60 tablet, Rfl: 5  •  fexofenadine (ALLEGRA) 180 MG tablet, Take 1 tablet by mouth 2 (Two) Times a Day., Disp: 60 tablet, Rfl: 5  •  levothyroxine sodium (TIROSINT) 112 MCG capsule, Take 1 capsule by mouth Daily., Disp: 30 capsule, Rfl: 5  •  losartan (COZAAR) 100 MG tablet, Take 1 tablet by mouth Daily., Disp: 30 tablet, Rfl: 5  •  Lutein 20 MG capsule, Take 20 mg by mouth Daily., Disp: , Rfl:   •  vitamin D (ERGOCALCIFEROL) 1.25 MG (24812 UT) capsule capsule, Take one tablet twice weekly for 4 weeks, then take one tablet weekly for 4 weeks. (Patient taking differently: then take one tablet weekly for 4 weeks.), Disp: 12 capsule, Rfl: 0  •  meloxicam (Mobic) 7.5 MG tablet, Take 1 tablet by mouth Daily As Needed for Mild Pain., Disp: 30 tablet, Rfl: 1  •  metaxalone (SKELAXIN) 400 MG tablet, Take 1 tablet by mouth 3 (Three) Times a Day As Needed for Muscle Spasms., Disp: 90 tablet, Rfl: 0    Allergies:   Allergies   Allergen Reactions   • Aspartame Unknown - High Severity   • Buspirone Dizziness   • Corticosteroids Hives and Swelling   • Latex Hives       Objective     Physical Exam:  Vital Signs:   Vitals:    04/12/23 0828   BP: 130/90   Pulse: 72   SpO2: 99%   Weight: 99.3 kg (219 lb)   Height: 170.2 cm (67\")     Body mass index is 34.3 kg/m².     Physical Exam  Vitals reviewed.   Constitutional:       Appearance: Normal appearance.   Neck:      Comments: ROM intact  Cardiovascular:      Rate and Rhythm: Normal rate.   Pulmonary:      Effort: Pulmonary effort is normal. No respiratory distress.   Musculoskeletal:         General: No swelling or tenderness. Normal range of motion.      Cervical back: Normal range of motion and neck supple. No tenderness.   Skin:     General: Skin is warm and dry.      Comments: Flat erythematous vascular appearing lesion on right forearm, nontender. No papules/pustules.  "   Neurological:      Mental Status: She is alert.   Psychiatric:         Mood and Affect: Mood normal.         Behavior: Behavior normal.         Judgment: Judgment normal.         Assessment / Plan      Assessment/Plan:   Problems Addressed This Visit  Diagnoses and all orders for this visit:    1. Chronic left-sided low back pain without sciatica (Primary)  -     Ambulatory Referral to Physical Therapy Evaluate and treat  -     meloxicam (Mobic) 7.5 MG tablet; Take 1 tablet by mouth Daily As Needed for Mild Pain.  Dispense: 30 tablet; Refill: 1  -     metaxalone (SKELAXIN) 400 MG tablet; Take 1 tablet by mouth 3 (Three) Times a Day As Needed for Muscle Spasms.  Dispense: 90 tablet; Refill: 0    MRI L spine 2022 - Multilevel lumbar spondylosis, most advanced at the L5-S1 level where  there is mild resultant spinal canal narrowing and moderate bilateral  neuroforaminal stenosis, greater on the right.    Reports discomfort is mild at this time but would like to aggressively prevent worsening. Referred to PT to work with neck and low back pain, she did notice improvement with regularly scheduled PT in the past. She has history of drowsiness with robaxin and flexeril. We will try low dose skelaxin in addition to Mobic daily PRN.     2. Chronic neck pain  -     Ambulatory Referral to Physical Therapy Evaluate and treat  -     meloxicam (Mobic) 7.5 MG tablet; Take 1 tablet by mouth Daily As Needed for Mild Pain.  Dispense: 30 tablet; Refill: 1  -     metaxalone (SKELAXIN) 400 MG tablet; Take 1 tablet by mouth 3 (Three) Times a Day As Needed for Muscle Spasms.  Dispense: 90 tablet; Refill: 0    3. Cervicalgia  -     Ambulatory Referral to Physical Therapy Evaluate and treat  -     meloxicam (Mobic) 7.5 MG tablet; Take 1 tablet by mouth Daily As Needed for Mild Pain.  Dispense: 30 tablet; Refill: 1  -     metaxalone (SKELAXIN) 400 MG tablet; Take 1 tablet by mouth 3 (Three) Times a Day As Needed for Muscle Spasms.   Dispense: 90 tablet; Refill: 0    4. Immunization due  -     Tdap Vaccine Greater Than or Equal To 6yo IM    5. Primary osteoarthritis of right foot  -     meloxicam (Mobic) 7.5 MG tablet; Take 1 tablet by mouth Daily As Needed for Mild Pain.  Dispense: 30 tablet; Refill: 1  Following with [facility]   Custom Orthotics  Meloxicam daily prn    6. Skin lesion  -     Ambulatory Referral to Dermatology          Plan of care reviewed with patient at the conclusion of today's visit. Education was provided regarding diagnosis and management.  Patient verbalizes understanding of and agreement with management plan.    Follow Up:   Return for Next scheduled follow up.        DO RADHA Torres RD  [facility]  [address]  [facility] [phone]  Fax [phone]  Phone [phone]

## 2023-04-12 NOTE — LETTER
"VACCINE CONSENT FORM      Patient Name:  Jeannette Aviles    Patient :  1975      I/We have read, or have been explained, the information about the diseases and the vaccines listed below.  There was an opportunity to ask questions and any questions were answered satisfactorily.  I/We believe that I/we understand the benefits and risks of the vaccines(s) cited, and ask the vaccine(s) listed below be given to me/us or the person named above (for which i have authorized to make the request).      Vaccine(s) give:    Orders Placed This Encounter   Procedures   • Tdap Vaccine Greater Than or Equal To 8yo IM         Medicare patients:    The only vaccine covered under your medical benefit is flu/pneumonia and hepatitis B.  All other may be covered under your \"Part D\" prescription plan and requires you to go to a pharmacy with a Physician orders for administration.  If you still prefer to have it administered at our office, you will receive a bill for the vaccine and administration cost.               Patient Initials                     Patient or Parent/Guardian Signature                    Date        A copy of the appropriate Centers for Disease Control and Prevention Vaccine Information Statements has been provided.   "

## 2023-04-20 ENCOUNTER — PATIENT MESSAGE (OUTPATIENT)
Dept: FAMILY MEDICINE CLINIC | Facility: CLINIC | Age: 48
End: 2023-04-20
Payer: COMMERCIAL

## 2023-04-26 RX ORDER — SODIUM PICOSULFATE, MAGNESIUM OXIDE, AND ANHYDROUS CITRIC ACID 10; 3.5; 12 MG/160ML; G/160ML; G/160ML
320 LIQUID ORAL TAKE AS DIRECTED
Qty: 320 ML | Refills: 0 | Status: SHIPPED | OUTPATIENT
Start: 2023-04-26

## 2023-05-04 ENCOUNTER — OUTSIDE FACILITY SERVICE (OUTPATIENT)
Dept: GASTROENTEROLOGY | Facility: CLINIC | Age: 48
End: 2023-05-04
Payer: COMMERCIAL

## 2023-05-04 PROCEDURE — 88305 TISSUE EXAM BY PATHOLOGIST: CPT | Performed by: INTERNAL MEDICINE

## 2023-05-04 PROCEDURE — 45385 COLONOSCOPY W/LESION REMOVAL: CPT | Performed by: INTERNAL MEDICINE

## 2023-05-04 PROCEDURE — 45380 COLONOSCOPY AND BIOPSY: CPT | Performed by: INTERNAL MEDICINE

## 2023-05-05 ENCOUNTER — LAB REQUISITION (OUTPATIENT)
Dept: LAB | Facility: HOSPITAL | Age: 48
End: 2023-05-05
Payer: COMMERCIAL

## 2023-05-05 DIAGNOSIS — K62.6 ULCER OF ANUS AND RECTUM: ICD-10-CM

## 2023-05-05 DIAGNOSIS — Z12.11 ENCOUNTER FOR SCREENING FOR MALIGNANT NEOPLASM OF COLON: ICD-10-CM

## 2023-05-05 DIAGNOSIS — K64.8 OTHER HEMORRHOIDS: ICD-10-CM

## 2023-05-05 DIAGNOSIS — D12.0 BENIGN NEOPLASM OF CECUM: ICD-10-CM

## 2023-05-05 DIAGNOSIS — D12.5 BENIGN NEOPLASM OF SIGMOID COLON: ICD-10-CM

## 2023-05-05 DIAGNOSIS — D12.2 BENIGN NEOPLASM OF ASCENDING COLON: ICD-10-CM

## 2023-05-08 LAB
CYTO UR: NORMAL
LAB AP CASE REPORT: NORMAL
LAB AP CLINICAL INFORMATION: NORMAL
PATH REPORT.FINAL DX SPEC: NORMAL
PATH REPORT.GROSS SPEC: NORMAL

## 2023-06-08 DIAGNOSIS — G89.29 CHRONIC NECK PAIN: ICD-10-CM

## 2023-06-08 DIAGNOSIS — M54.2 CERVICALGIA: ICD-10-CM

## 2023-06-08 DIAGNOSIS — M19.071 PRIMARY OSTEOARTHRITIS OF RIGHT FOOT: ICD-10-CM

## 2023-06-08 DIAGNOSIS — G89.29 CHRONIC LEFT-SIDED LOW BACK PAIN WITHOUT SCIATICA: ICD-10-CM

## 2023-06-08 DIAGNOSIS — M54.50 CHRONIC LEFT-SIDED LOW BACK PAIN WITHOUT SCIATICA: ICD-10-CM

## 2023-06-08 DIAGNOSIS — M54.2 CHRONIC NECK PAIN: ICD-10-CM

## 2023-06-08 RX ORDER — MELOXICAM 7.5 MG/1
TABLET ORAL
Qty: 30 TABLET | Refills: 1 | Status: SHIPPED | OUTPATIENT
Start: 2023-06-08

## 2023-06-08 NOTE — TELEPHONE ENCOUNTER
Rx Refill Note  Requested Prescriptions     Pending Prescriptions Disp Refills    meloxicam (MOBIC) 7.5 MG tablet [Pharmacy Med Name: MELOXICAM 7.5 MG TABLET] 30 tablet 1     Sig: TAKE ONE TABLET BY MOUTH DAILY AS NEEDED FOR MILD PAIN      Last office visit with prescribing clinician: 4/12/2023   Last telemedicine visit with prescribing clinician: Visit date not found   Next office visit with prescribing clinician: 7/21/2023                         Would you like a call back once the refill request has been completed: [] Yes [] No    If the office needs to give you a call back, can they leave a voicemail: [] Yes [] No    Nicole Flaherty MA  06/08/23, 08:31 EDT

## 2023-07-26 ENCOUNTER — LAB (OUTPATIENT)
Dept: LAB | Facility: HOSPITAL | Age: 48
End: 2023-07-26
Payer: COMMERCIAL

## 2023-07-26 ENCOUNTER — OFFICE VISIT (OUTPATIENT)
Dept: FAMILY MEDICINE CLINIC | Facility: CLINIC | Age: 48
End: 2023-07-26
Payer: COMMERCIAL

## 2023-07-26 ENCOUNTER — PATIENT MESSAGE (OUTPATIENT)
Dept: FAMILY MEDICINE CLINIC | Facility: CLINIC | Age: 48
End: 2023-07-26

## 2023-07-26 VITALS
SYSTOLIC BLOOD PRESSURE: 124 MMHG | BODY MASS INDEX: 34.53 KG/M2 | OXYGEN SATURATION: 99 % | HEIGHT: 67 IN | HEART RATE: 88 BPM | WEIGHT: 220 LBS | DIASTOLIC BLOOD PRESSURE: 80 MMHG

## 2023-07-26 DIAGNOSIS — E03.8 HYPOTHYROIDISM DUE TO HASHIMOTO'S THYROIDITIS: ICD-10-CM

## 2023-07-26 DIAGNOSIS — L98.9 SKIN LESION: Primary | ICD-10-CM

## 2023-07-26 DIAGNOSIS — E06.3 HYPOTHYROIDISM DUE TO HASHIMOTO'S THYROIDITIS: ICD-10-CM

## 2023-07-26 DIAGNOSIS — F41.0 PANIC DISORDER: ICD-10-CM

## 2023-07-26 DIAGNOSIS — F41.1 GENERALIZED ANXIETY DISORDER: Primary | ICD-10-CM

## 2023-07-26 DIAGNOSIS — L29.9 ITCHY SKIN: ICD-10-CM

## 2023-07-26 DIAGNOSIS — R21 RASH: ICD-10-CM

## 2023-07-26 PROCEDURE — 99214 OFFICE O/P EST MOD 30 MIN: CPT | Performed by: STUDENT IN AN ORGANIZED HEALTH CARE EDUCATION/TRAINING PROGRAM

## 2023-07-26 PROCEDURE — 84439 ASSAY OF FREE THYROXINE: CPT

## 2023-07-26 PROCEDURE — 84443 ASSAY THYROID STIM HORMONE: CPT

## 2023-07-26 NOTE — PROGRESS NOTES
Established Office Visit      Patient Name: Jeannette Aviles  : 1975   MRN: [id number]   Care Team: Patient Care Team:  Adelaide Garcia DO as PCP - General (Internal Medicine)    Chief Complaint:    Chief Complaint   Patient presents with    Anxiety     F/u       History of Present Illness: Jeannette Aviles is a 48 y.o. female with anxiety/depression, dermatitis, hypothyroidism, HTN, migraine without aura, chronic low back pain 2/2 lumbar DDD who is here today to follow up with anxiety.     She was last seen about 1 month ago for uncontrolled anxiety and concern for mental health crisis. She took medical leave of absence to allow time away from work and focus on mental health.   She has tried and failed zoloft, prozac, wellbutrin, buspar. We continued cymbalta 60mg daily and added hydroxyzine 25-50mg TID in addition to referral to behavioral health. She has established with behavioral health, has had one appointment and felt this was helpful. Interested continuing this on a weekly basis.     She feels mental health has not improved very much over the past 3 weeks and is hoping to have 3 additional weeks of leave to focus on therapy. She does feel her absence of work has been helpful to alleviate some stress. She has had time to prioritize some of her stressors at home that she did not have time for along with focusing on her children's health.     Subjective      Review of Systems:   Review of Systems - See HPI    I have reviewed and the following portions of the patient's history were updated as appropriate: past family history, past medical history, past social history, past surgical history and problem list.    Medications:     Current Outpatient Medications:     DULoxetine (CYMBALTA) 60 MG capsule, Take 1 capsule by mouth Daily., Disp: 30 capsule, Rfl: 5    famotidine (PEPCID) 40 MG tablet, Take 1 tablet by mouth 2 (Two) Times a Day., Disp: 60 tablet, Rfl: 5    fexofenadine (ALLEGRA) 180 MG  "tablet, Take 1 tablet by mouth 2 (Two) Times a Day., Disp: 60 tablet, Rfl: 5    hydrOXYzine (ATARAX) 25 MG tablet, Take 1-2 tablets by mouth Every 8 (Eight) Hours As Needed for Anxiety., Disp: 90 tablet, Rfl: 3    levothyroxine sodium (TIROSINT) 112 MCG capsule, Take 1 capsule by mouth Daily., Disp: 30 capsule, Rfl: 5    losartan (COZAAR) 100 MG tablet, Take 1 tablet by mouth Daily., Disp: 30 tablet, Rfl: 5    meloxicam (MOBIC) 7.5 MG tablet, TAKE ONE TABLET BY MOUTH DAILY AS NEEDED FOR MILD PAIN, Disp: 30 tablet, Rfl: 1    vitamin B-12 (CYANOCOBALAMIN) 1000 MCG tablet, Take 1 tablet by mouth Daily., Disp: 90 tablet, Rfl: 3    Allergies:   Allergies   Allergen Reactions    Aspartame Unknown - High Severity    Buspirone Dizziness    Corticosteroids Hives and Swelling    Latex Hives       Objective     Physical Exam:  Vital Signs:   Vitals:    07/26/23 1308   BP: 124/80   Pulse: 88   SpO2: 99%   Weight: 99.8 kg (220 lb)   Height: 170.2 cm (67\")     Body mass index is 34.46 kg/m².     Physical Exam  Vitals reviewed.   Constitutional:       Appearance: Normal appearance.   Cardiovascular:      Rate and Rhythm: Normal rate.   Pulmonary:      Effort: Pulmonary effort is normal. No respiratory distress.   Skin:     General: Skin is warm and dry.   Neurological:      Mental Status: She is alert.   Psychiatric:         Mood and Affect: Mood normal.         Behavior: Behavior normal.         Judgment: Judgment normal.       Assessment / Plan      Assessment/Plan:   Problems Addressed This Visit  Diagnoses and all orders for this visit:    1. Generalized anxiety disorder (Primary)  2. Panic disorder  Ongoing  Completed employer paperwork to extend leave until 08/18/23  Continue working on external stressors, exercising daily, and lifestyle modifications  Continue following with behavioral health for talk therapy  Continue Cymbalta and hydroxyzine     3. Hypothyroidism due to Hashimoto's thyroiditis  Comments:  Last labs " subclical hypothyroid - repeat today and adjust synthroid if necessary   She is interested in following with Endocrinology and I have placed referral today  Orders:  -     Ambulatory Referral to Endocrinology    Plan of care reviewed with patient at the conclusion of today's visit. Education was provided regarding diagnosis and management.  Patient verbalizes understanding of and agreement with management plan.    Follow Up:   Return in about 3 weeks (around 8/16/2023) for Recheck mood/paperwork.        DO RADHA Torres RD  [facility]  [address]  [facility] [phone]  Fax [phone]  Phone [phone]

## 2023-07-27 LAB
T4 FREE SERPL-MCNC: 1 NG/DL (ref 0.93–1.7)
TSH SERPL DL<=0.05 MIU/L-ACNC: 3.64 UIU/ML (ref 0.27–4.2)

## 2023-07-31 ENCOUNTER — TELEMEDICINE (OUTPATIENT)
Dept: PSYCHIATRY | Facility: CLINIC | Age: 48
End: 2023-07-31
Payer: COMMERCIAL

## 2023-07-31 DIAGNOSIS — F41.9 ANXIETY: Primary | ICD-10-CM

## 2023-07-31 PROCEDURE — 90837 PSYTX W PT 60 MINUTES: CPT | Performed by: COUNSELOR

## 2023-08-07 ENCOUNTER — TELEMEDICINE (OUTPATIENT)
Dept: PSYCHIATRY | Facility: CLINIC | Age: 48
End: 2023-08-07
Payer: COMMERCIAL

## 2023-08-07 DIAGNOSIS — F41.9 ANXIETY: Primary | ICD-10-CM

## 2023-08-07 PROCEDURE — 90837 PSYTX W PT 60 MINUTES: CPT | Performed by: COUNSELOR

## 2023-08-07 NOTE — PROGRESS NOTES
Date: August 7, 2023  Time In: 8:00AM  Time Out: 9:00AM  This provider is located at [place], [place] for [facility] (through [facility]), [address], [place] [address] using a secure [employer] Video Visit through [employer]. Patient is being seen remotely via telehealth at home address in [place] and stated they are in a secure environment for this session. The patient's condition being diagnosed/treated is appropriate for telemedicine. The provider identified herself as well as her credentials. The patient, and/or patients guardian, consent to be seen remotely, and when consent is given they understand that the consent allows for patient identifiable information to be sent to a third party as needed. They may refuse to be seen remotely at any time. The electronic data is encrypted and password protected, and the patient and/or guardian has been advised of the potential risks to privacy not withstanding such measures.     You have chosen to receive care through a telehealth visit.  Do you consent to use a video/audio connection for your medical care today? Yes    PROGRESS NOTE  Data:  Jeannette Aviles is a 48 y.o. female who presents today for follow up    Chief Complaint: Anxiety with Panic Attacks    History of Present Illness: Patient reports fluctuating anxiety with some depressed mood through the week. Patient reports Patient reports feeling bad about her son's special needs. Patient reports difficulty regulating emotions right before she took leave from work but was able to utilize coping skills to avoid being let go. Patient reports stress surrounding financial responsibilities and current direction with career goals.        Clinical Maneuvering/Intervention: CBT, MI, and Patient Centered    (Scales based on 0 - 10 with 10 being the worst)  Depression: 4 Anxiety: 7       Assisted patient in processing above session content; acknowledged and normalized  patient's thoughts, feelings, and concerns.  Rationalized patient thought process regarding recent stressors and life events. Discussed triggers associated with patient's emotions. Utilized CBT to process through and correct irrational cognitions with emphasis on  son's condition . Also discussed coping skills for patient to implement. 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  Discussed the ABCs of CBT and how to practice alternative thought patterns to improve emotions about activating events.  Discussed positive mental thought patterns and self-care skills to utilize through the week.    Allowed patient to freely discuss issues without interruption or judgment. Provided safe, confidential environment to facilitate the development of positive therapeutic relationship and encourage open, honest communication. Assisted patient in identifying risk factors which would indicate the need for higher level of care including thoughts to harm self or others and/or self-harming behavior and encouraged patient to contact this office, call 911, or present to the nearest emergency room should any of these events occur. Discussed crisis intervention services and means to access. Patient adamantly and convincingly denies current suicidal or homicidal ideation or perceptual disturbance.    Assessment:   Assessment   Patient appears to maintain relative stability as compared to their baseline.  However, patient continues to struggle with anxiety and depressed mood which continues to cause impairment in important areas of functioning.  A result, they can be reasonably expected to continue to benefit from treatment and would likely be at increased risk for decompensation otherwise.    Mental Status Exam:   Hygiene:   good  Cooperation:  Cooperative  Eye Contact:  Good  Psychomotor Behavior:  Appropriate  Affect:  Full range  Mood: normal  Speech:  Normal  Thought Process:   Linear  Thought Content:  Mood congruent  Suicidal:  None  Homicidal:  None  Hallucinations:  None  Delusion:  None  Memory:  Intact  Orientation:  Person, Place, Time and Situation  Reliability:  fair  Insight:  Fair  Judgement:  Fair  Impulse Control:  Fair  Physical/Medical Issues:  No        Patient's Support Network Includes:  , son, and extended family    Functional Status: Mild impairment     Progress toward goal: Patient is working towards practicing the ABC's of CBT model while at home to process through and correct irrational cognitions. Patient is making progress on daily positive affirmation and self care practices.     Prognosis: Good with Ongoing Treatment            Plan:    Patient will continue in individual outpatient therapy with focus on improved functioning and coping skills, maintaining stability, and avoiding decompensation and the need for higher level of care.    Patient will adhere to medication regimen as prescribed and report any side effects. Patient will contact this office, call 911 or present to the nearest emergency room should suicidal or homicidal ideations occur. Provide Cognitive Behavioral Therapy and Solution Focused Therapy to improve functioning, maintain stability, and avoid decompensation and the need for higher level of care.     Return in about 2 weeks, or earlier if symptoms worsen or fail to improve.           VISIT DIAGNOSIS:     ICD-10-CM ICD-9-CM   1. Anxiety  F41.9 300.00        Diagnoses and all orders for this visit:    1. Anxiety (Primary)           [facility] No Show Policy:  We understand unexpected circumstances arise; however, anytime you miss your appointment we are unable to provide you appropriate care.  In addition, each appointment missed could have been used to provide care for others.  We ask that you call at least 24 hours in advance to cancel or reschedule an appointment.  We would like to take this opportunity to  remind you of our policy stating patients who miss THREE or more appointments without cancelling or rescheduling 24 hours in advance of the appointment may be subject to cancellation of any further visits with our clinic and recommendation to seek in-person services/visits.    Please call [phone] to reschedule your appointment. If there are reasons that make it difficult for you to keep the appointments, please call and let us know how we can help.  Please understand that medication prescribing will not continue without seeing your provider.      [facility]'s No Show Policy reviewed with patient at today's visit. Patient verbalized understanding of this policy. Discussed with patient that in the event that there are three or more no show visits, it will be recommended that they pursue in-person services/visits as noncompliance with telehealth visits indicates that patient is not an appropriate candidate for telemedicine and would likely be more appropriate for in-person services/visits. Patient verbalizes understanding and is agreeable to this.       This document has been electronically signed by Jb Moore III, LCSW  August 7, 2023 09:55 EDT      Part of this note may be an electronic transcription/translation of spoken language to printed text using the Dragon Dictation System.

## 2023-08-14 ENCOUNTER — TELEPHONE (OUTPATIENT)
Dept: FAMILY MEDICINE CLINIC | Facility: CLINIC | Age: 48
End: 2023-08-14
Payer: COMMERCIAL

## 2023-08-14 DIAGNOSIS — M54.2 CERVICALGIA: ICD-10-CM

## 2023-08-14 DIAGNOSIS — M54.2 CHRONIC NECK PAIN: ICD-10-CM

## 2023-08-14 DIAGNOSIS — G89.29 CHRONIC LEFT-SIDED LOW BACK PAIN WITHOUT SCIATICA: ICD-10-CM

## 2023-08-14 DIAGNOSIS — M54.50 CHRONIC LEFT-SIDED LOW BACK PAIN WITHOUT SCIATICA: ICD-10-CM

## 2023-08-14 DIAGNOSIS — M19.071 PRIMARY OSTEOARTHRITIS OF RIGHT FOOT: ICD-10-CM

## 2023-08-14 DIAGNOSIS — G89.29 CHRONIC NECK PAIN: ICD-10-CM

## 2023-08-14 NOTE — TELEPHONE ENCOUNTER
Called pt and left v/m letting her know her appt with [facility] has been scheduled for 10-4-23 at 10:40am. Their location is [address] [place] [address]. Their phone number is [phone]. HUB can relay message.

## 2023-08-15 RX ORDER — MELOXICAM 7.5 MG/1
TABLET ORAL
Qty: 30 TABLET | Refills: 1 | Status: SHIPPED | OUTPATIENT
Start: 2023-08-15

## 2023-08-15 NOTE — TELEPHONE ENCOUNTER
Rx Refill Note  Requested Prescriptions     Pending Prescriptions Disp Refills    meloxicam (MOBIC) 7.5 MG tablet [Pharmacy Med Name: MELOXICAM 7.5 MG TABLET] 30 tablet 1     Sig: TAKE 1 TABLET BY MOUTH DAILY AS NEEDED FOR MILD PAIN      Last office visit with prescribing clinician: 7/26/2023   Last telemedicine visit with prescribing clinician: Visit date not found   Next office visit with prescribing clinician: 8/16/2023                         Would you like a call back once the refill request has been completed: [] Yes [] No    If the office needs to give you a call back, can they leave a voicemail: [] Yes [] No    Nicole Flaherty MA  08/15/23, 09:00 EDT

## 2023-08-16 ENCOUNTER — OFFICE VISIT (OUTPATIENT)
Dept: FAMILY MEDICINE CLINIC | Facility: CLINIC | Age: 48
End: 2023-08-16
Payer: COMMERCIAL

## 2023-08-16 VITALS
BODY MASS INDEX: 34.69 KG/M2 | HEART RATE: 95 BPM | SYSTOLIC BLOOD PRESSURE: 130 MMHG | HEIGHT: 67 IN | DIASTOLIC BLOOD PRESSURE: 90 MMHG | OXYGEN SATURATION: 97 % | WEIGHT: 221 LBS

## 2023-08-16 DIAGNOSIS — F41.1 GENERALIZED ANXIETY DISORDER: Primary | ICD-10-CM

## 2023-08-16 DIAGNOSIS — E66.9 CLASS 1 OBESITY WITH BODY MASS INDEX (BMI) OF 34.0 TO 34.9 IN ADULT, UNSPECIFIED OBESITY TYPE, UNSPECIFIED WHETHER SERIOUS COMORBIDITY PRESENT: ICD-10-CM

## 2023-08-16 PROCEDURE — 99214 OFFICE O/P EST MOD 30 MIN: CPT | Performed by: STUDENT IN AN ORGANIZED HEALTH CARE EDUCATION/TRAINING PROGRAM

## 2023-08-16 NOTE — PROGRESS NOTES
Established Office Visit      Patient Name: Jeannette Aviles  : 1975   MRN: [id number]   Care Team: Patient Care Team:  Adelaide Garcia DO as PCP - General (Internal Medicine)    Chief Complaint:    Chief Complaint   Patient presents with    Anxiety    Obesity       History of Present Illness: Jeannette Aviles is a 48 y.o. female with anxiety/depression, dermatitis, hypothyroidism, HTN, migraine without aura, chronic low back pain 2/2 lumbar DDD who is here today to follow up with employer paperwork regarding anxiety.    Last seen about 3 weeks ago for mental health leave of absence from work until 23. She has been working with external stressors and lifestyle modifications. Following with behavioral health for talk therapy. She is planning to return to work this weekend at [employer]. Feels mental health has somewhat improved and is comfortable returning to work.     She has concerns of weight gain, obesity. She reports weight has remained stable over the past several months other than a few lb. Planning to be more active as she returns to work and is on her feet all day. Has struggled with achieving weight loss over the past 20 years.       Subjective      Review of Systems:   Review of Systems - See HPI    I have reviewed and the following portions of the patient's history were updated as appropriate: past family history, past medical history, past social history, past surgical history and problem list.    Medications:     Current Outpatient Medications:     DULoxetine (CYMBALTA) 60 MG capsule, Take 1 capsule by mouth Daily., Disp: 30 capsule, Rfl: 5    famotidine (PEPCID) 40 MG tablet, Take 1 tablet by mouth 2 (Two) Times a Day., Disp: 60 tablet, Rfl: 5    fexofenadine (ALLEGRA) 180 MG tablet, Take 1 tablet by mouth 2 (Two) Times a Day., Disp: 60 tablet, Rfl: 5    hydrOXYzine (ATARAX) 25 MG tablet, Take 1-2 tablets by mouth Every 8 (Eight) Hours As Needed for Anxiety., Disp: 90 tablet,  "Rfl: 3    levothyroxine sodium (TIROSINT) 112 MCG capsule, Take 1 capsule by mouth Daily., Disp: 30 capsule, Rfl: 5    losartan (COZAAR) 100 MG tablet, Take 1 tablet by mouth Daily., Disp: 30 tablet, Rfl: 5    meloxicam (MOBIC) 7.5 MG tablet, TAKE 1 TABLET BY MOUTH DAILY AS NEEDED FOR MILD PAIN, Disp: 30 tablet, Rfl: 1    vitamin B-12 (CYANOCOBALAMIN) 1000 MCG tablet, Take 1 tablet by mouth Daily., Disp: 90 tablet, Rfl: 3    Allergies:   Allergies   Allergen Reactions    Aspartame Unknown - High Severity    Buspirone Dizziness    Corticosteroids Hives and Swelling    Latex Hives       Objective     Physical Exam:  Vital Signs:   Vitals:    08/16/23 1309   BP: 130/90   Pulse: 95   SpO2: 97%   Weight: 100 kg (221 lb)   Height: 170.2 cm (67\")     Body mass index is 34.61 kg/mý.     Physical Exam  Vitals reviewed.   Constitutional:       Appearance: Normal appearance. She is obese. She is not ill-appearing.   Cardiovascular:      Rate and Rhythm: Normal rate.   Pulmonary:      Effort: Pulmonary effort is normal. No respiratory distress.   Skin:     General: Skin is warm and dry.   Neurological:      Mental Status: She is alert.   Psychiatric:         Mood and Affect: Mood normal.         Behavior: Behavior normal.         Judgment: Judgment normal.       Assessment / Plan      Assessment/Plan:   Problems Addressed This Visit  Diagnoses and all orders for this visit:    1. Generalized anxiety disorder (Primary)    Stable   Planning to return to work this weekend  Continue active lifestyle, exercising regularly and following with behavioral health for talk therapy  Continue cymbalta and hydroxyzine prn    2. Class 1 obesity with body mass index (BMI) of 34.0 to 34.9 in adult, unspecified obesity type, unspecified whether serious comorbidity present  -     Ambulatory Referral to Weight Management Program    Referred to weight management program  Encouraged exercise 150min/week   Discussed dietary modifications and focusing " on improving protein intake, decreasing processed foods. We also discussed importance of eating small nutritious meals/snacks consistently rather than skipping meals/prolonged fasting    Plan of care reviewed with patient at the conclusion of today's visit. Education was provided regarding diagnosis and management.  Patient verbalizes understanding of and agreement with management plan.    Follow Up: as currently scheduled   No follow-ups on file.        DO RADHA Torres RD  [facility]  [address]  [facility] [address]  Fax [phone]  Phone [phone]

## 2023-08-22 ENCOUNTER — PATIENT MESSAGE (OUTPATIENT)
Dept: FAMILY MEDICINE CLINIC | Facility: CLINIC | Age: 48
End: 2023-08-22
Payer: COMMERCIAL

## 2023-08-22 DIAGNOSIS — H47.10 OPTIC NERVE SWELLING: Primary | ICD-10-CM

## 2023-09-12 DIAGNOSIS — E06.3 HYPOTHYROIDISM DUE TO HASHIMOTO'S THYROIDITIS: ICD-10-CM

## 2023-09-12 DIAGNOSIS — E03.8 HYPOTHYROIDISM DUE TO HASHIMOTO'S THYROIDITIS: ICD-10-CM

## 2023-09-12 RX ORDER — LEVOTHYROXINE SODIUM 112 UG/1
CAPSULE ORAL
Qty: 30 CAPSULE | Refills: 5 | Status: SHIPPED | OUTPATIENT
Start: 2023-09-12

## 2023-09-12 NOTE — TELEPHONE ENCOUNTER
Rx Refill Note  Requested Prescriptions     Pending Prescriptions Disp Refills    levothyroxine sodium (TIROSINT) 112 MCG capsule [Pharmacy Med Name: LEVOTHYROXINE 112 MCG CAPSULE] 30 capsule 5     Sig: TAKE ONE CAPSULE BY MOUTH DAILY      Last office visit with prescribing clinician: 8/16/2023   Last telemedicine visit with prescribing clinician: Visit date not found   Next office visit with prescribing clinician: 1/4/2024                         Would you like a call back once the refill request has been completed: [] Yes [] No    If the office needs to give you a call back, can they leave a voicemail: [] Yes [] No    Nicole Flaherty MA  09/12/23, 08:29 EDT

## 2023-09-21 ENCOUNTER — TELEPHONE (OUTPATIENT)
Dept: FAMILY MEDICINE CLINIC | Facility: CLINIC | Age: 48
End: 2023-09-21
Payer: COMMERCIAL

## 2023-09-21 NOTE — PROGRESS NOTES
"Chief Complaint   Patient presents with    Pain     Right Foot       HPI:  Jeannette Aviles is a 48 y.o. female who presents today for pain in the right foot.    Patient is a following [employer] message earlier this week.  \"Hi, I was hoping to get a new referral for [facility] (my old one was form a previous PCP and is ) so I can replace my worn out custom orthotics. I see Dr London specifically, if it is relevant to the referral. Thanks!\"    States that she was diagnosed in the past with hallux rigidus of the right foot.  Progressively worsening pain which prompted podiatry referral last year.  She still likes to podiatry was fitted for custom orthotic.  This tremendously helped her symptoms.  She is now on her second orthotic and states that she has had this for over 1 year now.  Can tell that it has started to wear down as she has been having recurrence in her pain.  Requires a lot of standing at her work.    Also needs some referrals for OT/hand therapy.  Still dealing with intermittent numbness tingling in the right fingers.  Has appointment later today.    States that her pain has worsened over the last few days.  Describes this \"pain all over my body.\"  She currently takes Cymbalta 60 mg daily and meloxicam 7.5 mg daily.  She does admit that she has had poor sleep over the last couple of days.  Concerned that the meloxicam may not be working any longer.  She denies any recent illness or other injuries.    PE:  Vitals:    23 1331   BP: 138/90   Pulse: 90   SpO2: 99%      Body mass index is 34.9 kg/m².    Gen Appearance: NAD  HEENT: Normocephalic, PERRL, no thyromegaly, trachea midline  Heart: RRR, normal S1 and S2, no murmur  Lungs: CTA b/l, no wheezing, no crackles  MSK: Moves all extremities well, normal gait, no peripheral edema  Pulses: Palpable and equal b/l  Neuro: No focal deficits    Current Outpatient Medications   Medication Sig Dispense Refill    DULoxetine (CYMBALTA) 60 MG " capsule Take 1 capsule by mouth Daily. 30 capsule 5    famotidine (PEPCID) 40 MG tablet Take 1 tablet by mouth 2 (Two) Times a Day. 60 tablet 5    fexofenadine (ALLEGRA) 180 MG tablet Take 1 tablet by mouth 2 (Two) Times a Day. 60 tablet 5    hydrOXYzine (ATARAX) 25 MG tablet Take 1-2 tablets by mouth Every 8 (Eight) Hours As Needed for Anxiety. 90 tablet 3    levothyroxine sodium (TIROSINT) 112 MCG capsule TAKE ONE CAPSULE BY MOUTH DAILY 30 capsule 5    losartan (COZAAR) 100 MG tablet Take 1 tablet by mouth Daily. 30 tablet 5    meloxicam (MOBIC) 7.5 MG tablet Take 1 to 2 tablets once a day for pain. 60 tablet 1    vitamin B-12 (CYANOCOBALAMIN) 1000 MCG tablet Take 1 tablet by mouth Daily. 90 tablet 3     No current facility-administered medications for this visit.        A/P:  Diagnoses and all orders for this visit:    1. Hallux rigidus of right foot (Primary)  -     Ambulatory Referral to Podiatry    2. Right foot pain  -     Ambulatory Referral to Podiatry    3. Cervicalgia  -     meloxicam (MOBIC) 7.5 MG tablet; Take 1 to 2 tablets once a day for pain.  Dispense: 60 tablet; Refill: 1    4. Chronic neck pain  -     meloxicam (MOBIC) 7.5 MG tablet; Take 1 to 2 tablets once a day for pain.  Dispense: 60 tablet; Refill: 1    5. Primary osteoarthritis of right foot  -     meloxicam (MOBIC) 7.5 MG tablet; Take 1 to 2 tablets once a day for pain.  Dispense: 60 tablet; Refill: 1    6. Chronic left-sided low back pain without sciatica  -     meloxicam (MOBIC) 7.5 MG tablet; Take 1 to 2 tablets once a day for pain.  Dispense: 60 tablet; Refill: 1       -Referral to podiatry for updated orthotics  - Referral for hand therapy was signed, scanned into chart and given to patient  - Discussed factors that may contribute to worsening pain symptoms including fatigue, dehydration, stress.  Advised patient work to optimize these.  Temporarily will increase meloxicam to 60 tabs per month from 30.  Can alternate this with Tylenol.   Do not take with other NSAIDs.  Advised that she return to taking 1 daily when pain improves.  Hopefully this is soon after she starts with therapy and gets new orthotics.    No follow-ups on file.     Dictated Utilizing Dragon Dictation    Please note that portions of this note were completed with a voice recognition program.    Part of this note may be an electronic transcription/translation of spoken language to printed text using the Dragon Dictation System.

## 2023-09-21 NOTE — TELEPHONE ENCOUNTER
Nandini called from [facility] about mutual patient. The patient made an appointment with them (she has been a patient before with them and her son goes there.)  They are emailing me a form that they faxed previously on 9/15/23 for a signature. Dr. Garcia is her provider. She has an appointment with Celine Rubi tomorrow as well.  I will print out the form once received and see if Celine Rubi is comfortable with signing it. Nandini said it was like an 'Internal Referral Form' they use.

## 2023-09-22 ENCOUNTER — OFFICE VISIT (OUTPATIENT)
Dept: FAMILY MEDICINE CLINIC | Facility: CLINIC | Age: 48
End: 2023-09-22
Payer: COMMERCIAL

## 2023-09-22 VITALS
SYSTOLIC BLOOD PRESSURE: 138 MMHG | OXYGEN SATURATION: 99 % | HEIGHT: 67 IN | DIASTOLIC BLOOD PRESSURE: 90 MMHG | HEART RATE: 90 BPM | WEIGHT: 222.8 LBS | BODY MASS INDEX: 34.97 KG/M2

## 2023-09-22 DIAGNOSIS — G89.29 CHRONIC NECK PAIN: ICD-10-CM

## 2023-09-22 DIAGNOSIS — G89.29 CHRONIC LEFT-SIDED LOW BACK PAIN WITHOUT SCIATICA: ICD-10-CM

## 2023-09-22 DIAGNOSIS — M79.671 RIGHT FOOT PAIN: ICD-10-CM

## 2023-09-22 DIAGNOSIS — M54.50 CHRONIC LEFT-SIDED LOW BACK PAIN WITHOUT SCIATICA: ICD-10-CM

## 2023-09-22 DIAGNOSIS — M54.2 CHRONIC NECK PAIN: ICD-10-CM

## 2023-09-22 DIAGNOSIS — M19.071 PRIMARY OSTEOARTHRITIS OF RIGHT FOOT: ICD-10-CM

## 2023-09-22 DIAGNOSIS — M54.2 CERVICALGIA: ICD-10-CM

## 2023-09-22 DIAGNOSIS — M20.21 HALLUX RIGIDUS OF RIGHT FOOT: Primary | ICD-10-CM

## 2023-09-22 RX ORDER — MELOXICAM 7.5 MG/1
TABLET ORAL
Qty: 60 TABLET | Refills: 1 | Status: SHIPPED | OUTPATIENT
Start: 2023-09-22

## 2023-09-28 ENCOUNTER — TELEMEDICINE (OUTPATIENT)
Dept: PSYCHIATRY | Facility: CLINIC | Age: 48
End: 2023-09-28
Payer: COMMERCIAL

## 2023-09-28 DIAGNOSIS — F41.9 ANXIETY: Primary | ICD-10-CM

## 2023-09-28 NOTE — PROGRESS NOTES
Date: September 28, 2023  Time In: 11:00AM  Time Out: 12:00PM  This provider is located at [place], [place] for [facility] (through [facility]), [address], [place] [address] using a secure [employer] Video Visit through [employer]. Patient is being seen remotely via telehealth at home address in [place] and stated they are in a secure environment for this session. The patient's condition being diagnosed/treated is appropriate for telemedicine. The provider identified herself as well as her credentials. The patient, and/or patients guardian, consent to be seen remotely, and when consent is given they understand that the consent allows for patient identifiable information to be sent to a third party as needed. They may refuse to be seen remotely at any time. The electronic data is encrypted and password protected, and the patient and/or guardian has been advised of the potential risks to privacy not withstanding such measures.     You have chosen to receive care through a telehealth visit.  Do you consent to use a video/audio connection for your medical care today? Yes    PROGRESS NOTE  Data:  Jeannette Aviles is a 48 y.o. female who presents today for follow up    Chief Complaint: Anxiety    History of Present Illness: Patient reports fluctuating anxiety with panic attacks through the week. Patient reports anxiety induced panic attack at work due to changes to work site which created increased stress at work and home. Patient reports changes were made and she was able to return to previous potion and work site. Patient reports financial stress and recently filling for bankruptcy due to excess debt. Patient reports recent raise in rent and increases in daily living costs have been stressful.  Patient reports working towards allowing child to become more independent with contributing in the household.  Patient reports recent changes at employment and less financial  burden has improved overall wellbeing.      Clinical Maneuvering/Intervention: CBT, MI, Patient Centered    (Scales based on 0 - 10 with 10 being the worst)  Depression: 1 Anxiety: 6       Assisted patient in processing above session content; acknowledged and normalized patient’s thoughts, feelings, and concerns.  Rationalized patient thought process regarding recent stressors and life events. Discussed triggers associated with patient's emotions. Also discussed coping skills for patient to implement. Discussed setting short-term goals to increase self-awareness and setting personal boundaries in the household.  Processed through thoughts and emotions surrounding work and financial stress.  Discussed continued work towards setting boundaries, positive thinking strategies, and increasing self-care.    Allowed patient to freely discuss issues without interruption or judgment. Provided safe, confidential environment to facilitate the development of positive therapeutic relationship and encourage open, honest communication. Assisted patient in identifying risk factors which would indicate the need for higher level of care including thoughts to harm self or others and/or self-harming behavior and encouraged patient to contact this office, call 911, or present to the nearest emergency room should any of these events occur. Discussed crisis intervention services and means to access. Patient adamantly and convincingly denies current suicidal or homicidal ideation or perceptual disturbance.    Assessment:   Assessment   Patient appears to maintain relative stability as compared to their baseline.  However, patient continues to struggle with anxiety which continues to cause impairment in important areas of functioning.  A result, they can be reasonably expected to continue to benefit from treatment and would likely be at increased risk for decompensation otherwise.    Mental Status Exam:   Hygiene:   good  Cooperation:   Cooperative  Eye Contact:  Good  Psychomotor Behavior:  Appropriate  Affect:  Full range  Mood: fluctates  Speech:  Normal  Thought Process:  Linear  Thought Content:  Mood congruent  Suicidal:  None  Homicidal:  None  Hallucinations:  None  Delusion:  None  Memory:  Intact  Orientation:  Person, Place, Time and Situation  Reliability:  fair  Insight:  Fair  Judgement:  Fair  Impulse Control:  Fair  Physical/Medical Issues:  No        Patient's Support Network Includes:  , son, and extended family    Functional Status: Mild impairment     Progress toward goal: Patient is making progress processing thoughts and emotions during sessions, utilizing positive thinking strategies, on daily positive affirmation and self care practices.     Prognosis: Good with Ongoing Treatment            Plan:    Patient will continue in individual outpatient therapy with focus on improved functioning and coping skills, maintaining stability, and avoiding decompensation and the need for higher level of care.    Patient will adhere to medication regimen as prescribed and report any side effects. Patient will contact this office, call 911 or present to the nearest emergency room should suicidal or homicidal ideations occur. Provide Cognitive Behavioral Therapy and Solution Focused Therapy to improve functioning, maintain stability, and avoid decompensation and the need for higher level of care.     Return in about 2 weeks, or earlier if symptoms worsen or fail to improve.           VISIT DIAGNOSIS:     ICD-10-CM ICD-9-CM   1. Anxiety  F41.9 300.00        Diagnoses and all orders for this visit:    1. Anxiety (Primary)           [facility] No Show Policy:  We understand unexpected circumstances arise; however, anytime you miss your appointment we are unable to provide you appropriate care.  In addition, each appointment missed could have been used to provide care for others.  We ask that you call at least 24  hours in advance to cancel or reschedule an appointment.  We would like to take this opportunity to remind you of our policy stating patients who miss THREE or more appointments without cancelling or rescheduling 24 hours in advance of the appointment may be subject to cancellation of any further visits with our clinic and recommendation to seek in-person services/visits.    Please call [phone] to reschedule your appointment. If there are reasons that make it difficult for you to keep the appointments, please call and let us know how we can help.  Please understand that medication prescribing will not continue without seeing your provider.      [facility]'s No Show Policy reviewed with patient at today's visit. Patient verbalized understanding of this policy. Discussed with patient that in the event that there are three or more no show visits, it will be recommended that they pursue in-person services/visits as noncompliance with telehealth visits indicates that patient is not an appropriate candidate for telemedicine and would likely be more appropriate for in-person services/visits. Patient verbalizes understanding and is agreeable to this.       This document has been electronically signed by Jb Moore III, LCSW  September 28, 2023 11:06 EDT      Part of this note may be an electronic transcription/translation of spoken language to printed text using the Dragon Dictation System.

## 2023-10-05 ENCOUNTER — OFFICE VISIT (OUTPATIENT)
Dept: ENDOCRINOLOGY | Facility: CLINIC | Age: 48
End: 2023-10-05
Payer: COMMERCIAL

## 2023-10-05 VITALS
OXYGEN SATURATION: 96 % | HEART RATE: 84 BPM | SYSTOLIC BLOOD PRESSURE: 126 MMHG | BODY MASS INDEX: 35.47 KG/M2 | WEIGHT: 226 LBS | DIASTOLIC BLOOD PRESSURE: 80 MMHG | HEIGHT: 67 IN

## 2023-10-05 DIAGNOSIS — E03.9 HYPOTHYROIDISM, UNSPECIFIED TYPE: Primary | ICD-10-CM

## 2023-10-05 PROCEDURE — 99203 OFFICE O/P NEW LOW 30 MIN: CPT | Performed by: INTERNAL MEDICINE

## 2023-10-05 RX ORDER — LEVOTHYROXINE SODIUM 0.12 MG/1
125 TABLET ORAL DAILY
Qty: 30 TABLET | Refills: 3
Start: 2023-10-05

## 2023-10-05 NOTE — PROGRESS NOTES
Chief Complaint   Patient presents with    Hypothyroidism        New patient who is being seen in consultation regarding hypothyroidism at the request of Adelaide Mc DO HPI Elizabeth Annette Aviles is a 48 y.o. female who presents for evaluation of hypothyroidism.    Patient reports that she was diagnosed with hypothyroidism within the past 10 years. She reports that she was symptomatic for several years before she started medication but was told that TSH was normal. She reports that later on, thyroid antibodies were checked and TPO antibodies were elevated. She was started on Levothyroxine at that time.  She reports she initially took 25 mcg daily  Progressively increased.  She estimates that she has been on her current dose, 112 mcg daily, for the past couple of years.  She does have sent ongoing concern for dry/itchy skin, hair loss, fatigue, weight gain.  She reports that historically she has felt better when thyroid hormone is below in the normal range.  She also reports that she does not believe July labs were accurate as she took biotin in close proximity to lab draw.    Past Medical History:   Diagnosis Date    Allergic 2019    latex, topical corticosteroids, sugar substitutes    Anxiety 1999    Arthritis 2021    lumbar spine, right big toe/ hallux rigidus    Depression 1999    Headache 1994    History of medical problems 2001, 2018    PMDD, pelvic floor weakness    Hypertension 2020    Hypothyroidism 2015    Hashimoto's Disease    Low back pain 2001    Neuromuscular disorder 2020    cubital and carpal tunnel symptoms    Obesity 2001     Past Surgical History:   Procedure Laterality Date    COLONOSCOPY  2023 May 4th    three pre-cancerous polyps removed      Family History   Problem Relation Age of Onset    Arthritis Mother         RA and Lupus/SLE    Depression Mother     Thyroid disease Mother         hypothyroidism    Lupus Mother     Hypertension Father     Anxiety disorder Father     Heart  disease Father         heart attack at 69yo    Hyperlipidemia Father         since his 20s    Stroke Father         several minor strokes in his 60s    Thyroid disease Father         hyperthyroidism    Heart attack Father     Post-traumatic stress disorder Brother     Other Brother         tramatic brain injury    Alcohol abuse Brother     Drug abuse Brother     Cancer Maternal Aunt     Thyroid disease Maternal Aunt         hypothyroidism    Skin cancer Maternal Aunt     Early death Maternal Uncle         car accident at 18- I know, not a health issue, but you asked.    Heart attack Paternal Aunt     Diabetes Paternal Aunt     Heart disease Paternal Aunt         fatal heart attack at 69yo, had had previous (I think a stent?)    Thyroid disease Paternal Aunt         hypo, I think    Other Paternal Aunt         endometriosis    COPD Maternal Grandmother         lifelong smoker    Cancer Maternal Grandfather         lifelong smoker, lung cancer    Lung cancer Maternal Grandfather     Breast cancer Paternal Grandmother     Cancer Paternal Grandmother         breast cancer- her whole family had cancer of some type    Early death Paternal Grandmother         breast cancer at 58 years old    Hypertension Paternal Grandfather     Hyperlipidemia Paternal Grandfather     Liver disease Paternal Grandfather          at 68    Stroke Paternal Grandfather         sever impairment afterwards, had to live in care facility    Hyperlipidemia Son     Anxiety disorder Son         also autistic    Depression Son     Developmental Disability Son         autism    Liver disease Son         NAFLD/ BECK at 20 years old    Mental illness Son         are we counting autism here?    Autism Son     Ovarian cancer Neg Hx       Social History     Socioeconomic History    Marital status:    Tobacco Use    Smoking status: Never    Smokeless tobacco: Never    Tobacco comments:     Strongly anti-smoker   Vaping Use    Vaping Use: Never used    Substance and Sexual Activity    Alcohol use: Not Currently     Comment: maybe 5 times a year, not much to speak of really    Drug use: Never    Sexual activity: Yes     Partners: Male     Birth control/protection: None     Comment: secondary infertility, miscarriage      Allergies   Allergen Reactions    Aspartame Unknown - High Severity    Buspirone Dizziness    Corticosteroids Hives and Swelling    Latex Hives      Current Outpatient Medications on File Prior to Visit   Medication Sig Dispense Refill    DULoxetine (CYMBALTA) 60 MG capsule Take 1 capsule by mouth Daily. 30 capsule 5    famotidine (PEPCID) 40 MG tablet Take 1 tablet by mouth 2 (Two) Times a Day. 60 tablet 5    fexofenadine (ALLEGRA) 180 MG tablet Take 1 tablet by mouth 2 (Two) Times a Day. 60 tablet 5    hydrOXYzine (ATARAX) 25 MG tablet Take 1-2 tablets by mouth Every 8 (Eight) Hours As Needed for Anxiety. 90 tablet 3    losartan (COZAAR) 100 MG tablet Take 1 tablet by mouth Daily. 30 tablet 5    meloxicam (MOBIC) 7.5 MG tablet Take 1 to 2 tablets once a day for pain. (Patient taking differently: Take 2 tablets by mouth Daily. Take 1 to 2 tablets once a day for pain.) 60 tablet 1    vitamin B-12 (CYANOCOBALAMIN) 1000 MCG tablet Take 1 tablet by mouth Daily. 90 tablet 3    [DISCONTINUED] levothyroxine sodium (TIROSINT) 112 MCG capsule TAKE ONE CAPSULE BY MOUTH DAILY 30 capsule 5     No current facility-administered medications on file prior to visit.        Review of Systems   Constitutional:  Positive for fatigue and unexpected weight gain.   Eyes:  Positive for visual disturbance.   Musculoskeletal:  Positive for arthralgias.   Skin:  Positive for dry skin.   Neurological:  Positive for headache.   Psychiatric/Behavioral:  Positive for decreased concentration and depressed mood. The patient is nervous/anxious.         Vitals:    10/05/23 1303   BP: 126/80   BP Location: Right arm   Patient Position: Sitting   Cuff Size: Adult   Pulse: 84  "  SpO2: 96%   Weight: 103 kg (226 lb)   Height: 170.2 cm (67\")   Body mass index is 35.4 kg/m².     Physical Exam  Vitals reviewed.   Constitutional:       Appearance: She is obese.   HENT:      Head: Normocephalic and atraumatic.   Neck:      Thyroid: No thyromegaly.   Cardiovascular:      Rate and Rhythm: Normal rate and regular rhythm.   Pulmonary:      Effort: Pulmonary effort is normal.      Breath sounds: Normal breath sounds.   Abdominal:      General: Bowel sounds are normal.      Palpations: Abdomen is soft.      Tenderness: There is no abdominal tenderness.   Lymphadenopathy:      Cervical: No cervical adenopathy.   Skin:     General: Skin is warm and dry.   Neurological:      General: No focal deficit present.      Mental Status: She is alert.   Psychiatric:         Mood and Affect: Mood and affect normal.         Behavior: Behavior is cooperative.        Labs/Imaging   Latest Reference Range & Units 01/18/23 14:54 06/28/23 11:47 07/26/23 13:53   TSH Baseline 0.270 - 4.200 uIU/mL 2.600 6.030 (H) 3.640   Free T4 0.93 - 1.70 ng/dL  1.01 1.00   (H): Data is abnormally high    Assessment and Plan    Diagnoses and all orders for this visit:    1. Hypothyroidism, unspecified type (Primary)  -     TSH; Future  -     T4, Free; Future  Secondary to Hashimoto's disease  Reviewed with patient that most recent thyroid function testing is normal.  She does report historically she has felt better when TSH is at the lower end of normal range.  We discussed option of increasing levothyroxine to 125 mcg daily.  Patient will repeat labs 6 to 8 weeks after dose change.  Reviewed with patient that given TSH was normal in July on current dose, it is possible that increasing dose may have no effect on her symptoms as they are nonspecific and could be multifactorial.  Patient voiced understanding.  We also discussed that some of her symptoms, specifically concern for vision change would not be expected to be due to " hypothyroidism and recommended she seek separate evaluation regarding this.    Other orders  -     levothyroxine (SYNTHROID, LEVOTHROID) 125 MCG tablet; Take 1 tablet by mouth Daily.  Dispense: 30 tablet; Refill: 3         Return in about 4 months (around 2/5/2024) for Next scheduled follow up. The patient was instructed to contact the clinic with any interval questions or concerns.    Marialuisa Lu MD     Dictated Utilizing Dragon Dictation

## 2023-10-25 ENCOUNTER — TELEMEDICINE (OUTPATIENT)
Dept: PSYCHIATRY | Facility: CLINIC | Age: 48
End: 2023-10-25
Payer: COMMERCIAL

## 2023-10-25 DIAGNOSIS — F41.9 ANXIETY: Primary | ICD-10-CM

## 2023-10-25 DIAGNOSIS — G56.01 CARPAL TUNNEL SYNDROME OF RIGHT WRIST: ICD-10-CM

## 2023-10-25 DIAGNOSIS — F32.A ANXIETY AND DEPRESSION: ICD-10-CM

## 2023-10-25 DIAGNOSIS — F41.9 ANXIETY AND DEPRESSION: ICD-10-CM

## 2023-10-25 RX ORDER — DULOXETIN HYDROCHLORIDE 60 MG/1
60 CAPSULE, DELAYED RELEASE ORAL DAILY
Qty: 90 CAPSULE | Refills: 1 | Status: SHIPPED | OUTPATIENT
Start: 2023-10-25

## 2023-10-25 NOTE — PROGRESS NOTES
Date: October 25, 2023  Time In: 9:00AM  Time Out: 10:00AM  This provider is located at [place], [place] for [facility] (through [facility]), [address], [place] [address] using a secure [employer] Video Visit through [employer]. Patient is being seen remotely via telehealth at home address in [place] and stated they are in a secure environment for this session. The patient's condition being diagnosed/treated is appropriate for telemedicine. The provider identified herself as well as her credentials. The patient, and/or patients guardian, consent to be seen remotely, and when consent is given they understand that the consent allows for patient identifiable information to be sent to a third party as needed. They may refuse to be seen remotely at any time. The electronic data is encrypted and password protected, and the patient and/or guardian has been advised of the potential risks to privacy not withstanding such measures.     You have chosen to receive care through a telehealth visit.  Do you consent to use a video/audio connection for your medical care today? Yes    PROGRESS NOTE  Data:  Jeannette Aviles is a 48 y.o. female who presents today for follow up    Chief Complaint: Anxiety    History of Present Illness: Patient reports fluctuating anxiety and depression through the week. Patient reports some stress related scheduling appointments for herself and her son who has OPT several times a month. Patient reports stress surrounding finances and recent bankruptcy. Patient reports main life priority is stability and security for her son's future. Patient reports identified several short-term goals to achieve prior to turning 51. Patient reports finding acceptance and current position at [employer] and returning to work. Patient reports she was able to complete and achieve filing for FMLA for her son with special needs.  Patient reports desire to explore options for  returning to school while still working at [employer].       Clinical Maneuvering/Intervention: CBT, MI, Patient Centered    (Scales based on 0 - 10 with 10 being the worst)  Depression: 4 Anxiety: 6       Assisted patient in processing above session content; acknowledged and normalized patient’s thoughts, feelings, and concerns.  Rationalized patient thought process regarding recent stressors and life events. Discussed triggers associated with patient's emotions. Utilized CBT to process through and correct irrational cognitions with emphasis on  pros and cons of higher education . Also discussed coping skills for patient to implement. Discussed priority ranking of life goals and continuing to work towards financial stability.  Processed through thoughts and emotions surrounding current work and possible future goal of changing career field.  Discussed continued work towards assisting son with appointments, exploring educational tracks, and self-care practices.    Allowed patient to freely discuss issues without interruption or judgment. Provided safe, confidential environment to facilitate the development of positive therapeutic relationship and encourage open, honest communication. Assisted patient in identifying risk factors which would indicate the need for higher level of care including thoughts to harm self or others and/or self-harming behavior and encouraged patient to contact this office, call 911, or present to the nearest emergency room should any of these events occur. Discussed crisis intervention services and means to access. Patient adamantly and convincingly denies current suicidal or homicidal ideation or perceptual disturbance.    Assessment:   Assessment   Patient appears to maintain relative stability as compared to their baseline.  However, patient continues to struggle with anxiety and depression which continues to cause impairment in important areas of functioning.  A result, they can be  reasonably expected to continue to benefit from treatment and would likely be at increased risk for decompensation otherwise.    Mental Status Exam:   Hygiene:   good  Cooperation:  Cooperative  Eye Contact:  Good  Psychomotor Behavior:  Appropriate  Affect:  Full range  Mood: normal  Speech:  Normal  Thought Process:  Linear  Thought Content:  Mood congruent  Suicidal:  None  Homicidal:  None  Hallucinations:  None  Delusion:  None  Memory:  Intact  Orientation:  Person, Place, Time and Situation  Reliability:  fair  Insight:  Fair  Judgement:  Fair  Impulse Control:  Fair  Physical/Medical Issues:  No        Patient's Support Network Includes:  , son, and extended family    Functional Status: Mild impairment     Progress toward goal: Patient is working towards practicing the ABC's of CBT model while at home to process through and correct or improve cognitions. Patient is making progress on processing thoughts and emotions during sessions, utilizing positive thinking strategies, daily positive affirmations and self care practices.     Prognosis: Good with Ongoing Treatment            Plan:    Patient will continue in individual outpatient therapy with focus on improved functioning and coping skills, maintaining stability, and avoiding decompensation and the need for higher level of care.    Patient will adhere to medication regimen as prescribed and report any side effects. Patient will contact this office, call 911 or present to the nearest emergency room should suicidal or homicidal ideations occur. Provide Cognitive Behavioral Therapy and Solution Focused Therapy to improve functioning, maintain stability, and avoid decompensation and the need for higher level of care.     Return in about 2 weeks, or earlier if symptoms worsen or fail to improve.           VISIT DIAGNOSIS:     ICD-10-CM ICD-9-CM   1. Anxiety  F41.9 300.00        Diagnoses and all orders for this visit:    1. Anxiety (Primary)            [facility] No Show Policy:  We understand unexpected circumstances arise; however, anytime you miss your appointment we are unable to provide you appropriate care.  In addition, each appointment missed could have been used to provide care for others.  We ask that you call at least 24 hours in advance to cancel or reschedule an appointment.  We would like to take this opportunity to remind you of our policy stating patients who miss THREE or more appointments without cancelling or rescheduling 24 hours in advance of the appointment may be subject to cancellation of any further visits with our clinic and recommendation to seek in-person services/visits.    Please call [phone] to reschedule your appointment. If there are reasons that make it difficult for you to keep the appointments, please call and let us know how we can help.  Please understand that medication prescribing will not continue without seeing your provider.      [facility]'s No Show Policy reviewed with patient at today's visit. Patient verbalized understanding of this policy. Discussed with patient that in the event that there are three or more no show visits, it will be recommended that they pursue in-person services/visits as noncompliance with telehealth visits indicates that patient is not an appropriate candidate for telemedicine and would likely be more appropriate for in-person services/visits. Patient verbalizes understanding and is agreeable to this.       This document has been electronically signed by Jb Moore III, LCSW  October 25, 2023 09:12 EDT      Part of this note may be an electronic transcription/translation of spoken language to printed text using the Dragon Dictation System.

## 2023-10-25 NOTE — TELEPHONE ENCOUNTER
Rx Refill Note  Requested Prescriptions     Pending Prescriptions Disp Refills    DULoxetine (CYMBALTA) 60 MG capsule [Pharmacy Med Name: DULoxetine HCL DR 60 MG CAPSULE] 30 capsule 5     Sig: TAKE ONE CAPSULE BY MOUTH DAILY      Last office visit with prescribing clinician: 8/16/2023     Next office visit with prescribing clinician: 1/4/2024   Anna Seymour MA  10/25/23, 08:02 EDT

## 2023-11-17 ENCOUNTER — OFFICE VISIT (OUTPATIENT)
Dept: FAMILY MEDICINE CLINIC | Facility: CLINIC | Age: 48
End: 2023-11-17
Payer: COMMERCIAL

## 2023-11-17 VITALS
SYSTOLIC BLOOD PRESSURE: 136 MMHG | BODY MASS INDEX: 34.53 KG/M2 | HEART RATE: 92 BPM | WEIGHT: 220 LBS | DIASTOLIC BLOOD PRESSURE: 86 MMHG | OXYGEN SATURATION: 98 % | HEIGHT: 67 IN

## 2023-11-17 DIAGNOSIS — J40 BRONCHITIS: Primary | ICD-10-CM

## 2023-11-17 RX ORDER — AZELASTINE 1 MG/ML
2 SPRAY, METERED NASAL 2 TIMES DAILY
Qty: 30 ML | Refills: 0 | Status: SHIPPED | OUTPATIENT
Start: 2023-11-17

## 2023-11-17 RX ORDER — BENZONATATE 200 MG/1
200 CAPSULE ORAL 3 TIMES DAILY PRN
Qty: 30 CAPSULE | Refills: 0 | Status: SHIPPED | OUTPATIENT
Start: 2023-11-17 | End: 2023-11-27

## 2023-11-17 RX ORDER — AZITHROMYCIN 250 MG/1
TABLET, FILM COATED ORAL
Qty: 6 TABLET | Refills: 0 | Status: SHIPPED | OUTPATIENT
Start: 2023-11-17

## 2023-11-17 RX ORDER — METHYLPREDNISOLONE 4 MG/1
TABLET ORAL
Qty: 1 EACH | Refills: 0 | Status: SHIPPED | OUTPATIENT
Start: 2023-11-17 | End: 2023-11-17

## 2023-11-17 NOTE — PROGRESS NOTES
Chief Complaint   Patient presents with    Cough     X 10 days       HPI:  Jeannette Aviles is a 48 y.o. female who presents today for cough, sore throat.     Symptoms started 11/8.  Initially had sore throat and severe fatigue, body aches.  States she laid in bed most of the day.  Felt like she could not go into work the next night.  She was off for the next couple of days and thought she would be able to return on her next scheduled shift, however symptoms fail to improve by Tuesday.  She continued to experience severe fatigue and low-grade elevated temperatures.  On her next scheduled date she called the office to schedule an appointment but states this was the earliest appointment she could get.  The sore throat and fatigue have gradually started to improve however cough feels a deeper into her chest and is now productive.  She is scheduled to go back into work tonight but does not feel like she is able.  Request paperwork filled out for her period of absence. Works at [employer] on night shift.   Denies SOB, chest pain.      PE:  Vitals:    11/17/23 0756   BP: 136/86   Pulse: 92   SpO2: 98%      Body mass index is 34.46 kg/m².    Gen Appearance: NAD  HEENT: Normocephalic, EOMI, no cervical lymphadenopathy, oropharyngeal cobblestoning noted  Heart: RRR, normal S1 and S2, no murmur  Lungs: Faint expiratory wheezing on the right lower lung, no crackles or rales  MSK: Moves all extremities well, normal gait, no peripheral edema  Neuro: No focal deficits    Current Outpatient Medications   Medication Sig Dispense Refill    DULoxetine (CYMBALTA) 60 MG capsule TAKE ONE CAPSULE BY MOUTH DAILY 90 capsule 1    famotidine (PEPCID) 40 MG tablet Take 1 tablet by mouth 2 (Two) Times a Day. 60 tablet 5    fexofenadine (ALLEGRA) 180 MG tablet Take 1 tablet by mouth 2 (Two) Times a Day. 60 tablet 5    levothyroxine (SYNTHROID, LEVOTHROID) 125 MCG tablet Take 1 tablet by mouth Daily. 30 tablet 3    losartan (COZAAR) 100 MG  tablet Take 1 tablet by mouth Daily. 30 tablet 5    meloxicam (MOBIC) 7.5 MG tablet Take 1 to 2 tablets once a day for pain. (Patient taking differently: Take 2 tablets by mouth Daily. Take 1 to 2 tablets once a day for pain.) 60 tablet 1    vitamin B-12 (CYANOCOBALAMIN) 1000 MCG tablet Take 1 tablet by mouth Daily. 90 tablet 3    azelastine (ASTELIN) 0.1 % nasal spray 2 sprays into the nostril(s) as directed by provider 2 (Two) Times a Day. Use in each nostril as directed 30 mL 0    azithromycin (Zithromax Z-Raudel) 250 MG tablet Take 2 tablets by mouth on day 1, then 1 tablet daily on days 2-5 6 tablet 0    benzonatate (TESSALON) 200 MG capsule Take 1 capsule by mouth 3 (Three) Times a Day As Needed for Cough for up to 10 days. 30 capsule 0     No current facility-administered medications for this visit.        A/P:  Diagnoses and all orders for this visit:    1. Bronchitis (Primary)  -     azelastine (ASTELIN) 0.1 % nasal spray; 2 sprays into the nostril(s) as directed by provider 2 (Two) Times a Day. Use in each nostril as directed  Dispense: 30 mL; Refill: 0  -     benzonatate (TESSALON) 200 MG capsule; Take 1 capsule by mouth 3 (Three) Times a Day As Needed for Cough for up to 10 days.  Dispense: 30 capsule; Refill: 0  -     azithromycin (Zithromax Z-Raudel) 250 MG tablet; Take 2 tablets by mouth on day 1, then 1 tablet daily on days 2-5  Dispense: 6 tablet; Refill: 0    Other orders  -     Discontinue: methylPREDNISolone (MEDROL) 4 MG dose pack; Take as directed on package instructions.  Dispense: 1 each; Refill: 0    Based on history I suspect the patient initially had a viral syndrome which is now developed into a lingering postviral cough/bronchitis.  She does have expiratory wheezing.  Unfortunately she reports an allergy to corticosteroids (topical) in the past which caused chemical burning on her skin.  Has never tried oral steroids to her knowledge.  We will treat with azithromycin given duration of symptoms  and forehead and anti-inflammatory effect.  Had initially sent a prescription for Medrol Dosepak but will hold on this for now.  Trial Astelin spray for congestion and Tessalon Perles for cough.  Follow-up if symptoms fail to improve.    I did fill out forms for her absence, however advised that the future we would be unable to backdate forms and she would need to call soon as her symptoms started rather than his work without physician recommendation.  Return to work on 11/19.      Return if symptoms worsen or fail to improve.     Dictated Utilizing Dragon Dictation    Please note that portions of this note were completed with a voice recognition program.    Part of this note may be an electronic transcription/translation of spoken language to printed text using the Dragon Dictation System.

## 2023-11-26 DIAGNOSIS — L29.9 ITCHY SKIN: ICD-10-CM

## 2023-11-27 RX ORDER — FEXOFENADINE HCL 180 MG/1
180 TABLET ORAL 2 TIMES DAILY
Qty: 90 TABLET | Refills: 1 | Status: SHIPPED | OUTPATIENT
Start: 2023-11-27

## 2023-11-27 NOTE — TELEPHONE ENCOUNTER
Rx Refill Note  Requested Prescriptions     Pending Prescriptions Disp Refills    fexofenadine (ALLEGRA) 180 MG tablet 60 tablet 5     Sig: Take 1 tablet by mouth 2 (Two) Times a Day.      Last office visit with prescribing clinician: 8/16/2023   Last telemedicine visit with prescribing clinician: Visit date not found   Next office visit with prescribing clinician: 1/3/2024                         Would you like a call back once the refill request has been completed: [] Yes [] No    If the office needs to give you a call back, can they leave a voicemail: [] Yes [] No    Nicole Flaherty MA  11/27/23, 08:51 EST

## 2023-11-29 ENCOUNTER — TELEMEDICINE (OUTPATIENT)
Dept: PSYCHIATRY | Facility: CLINIC | Age: 48
End: 2023-11-29
Payer: COMMERCIAL

## 2023-11-29 DIAGNOSIS — F41.9 ANXIETY: Primary | ICD-10-CM

## 2023-11-29 NOTE — PROGRESS NOTES
Date: November 29, 2023  Time In: 11:00AM  Time Out: 12:00PM  This provider is located at [place], [place] for [facility] (through [facility]), [address], [place] [address] using a secure [employer]hart Video Visit through [employer]. Patient is being seen remotely via telehealth at home address in [place] and stated they are in a secure environment for this session. The patient's condition being diagnosed/treated is appropriate for telemedicine. The provider identified herself as well as her credentials. The patient, and/or patients guardian, consent to be seen remotely, and when consent is given they understand that the consent allows for patient identifiable information to be sent to a third party as needed. They may refuse to be seen remotely at any time. The electronic data is encrypted and password protected, and the patient and/or guardian has been advised of the potential risks to privacy not withstanding such measures.     You have chosen to receive care through a telehealth visit.  Do you consent to use a video/audio connection for your medical care today? Yes    PROGRESS NOTE  Data:  Jeannette Aviles is a 48 y.o. female who presents today for follow up    Chief Complaint: Anxiety    History of Present Illness: Patient reports fluctuating anxiety through the week. Patient reports increased work related stress during the holiday season rush and returns which well be happening soon. Patient reports financial stress and attempting to work overtime which was not great for mental health and sleep. Patient reports experiencing limited time to manage work and home life responsibilities. Patient reports some unhealthy food habits when working 4 days in the row. Patient reports feeling some chronic pain recently resulting from work related injury. 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  Patient reports possibly working towards re-registering at some point but is taking a break from continuing. Patient reports medications have been working well to manage symptoms.        Clinical Maneuvering/Intervention: CBT, MI, Patient Centered    (Scales based on 0 - 10 with 10 being the worst)  Depression: 2 Anxiety: 7       Assisted patient in processing above session content; acknowledged and normalized patient’s thoughts, feelings, and concerns.  Rationalized patient thought process regarding recent stressors and life events. Discussed triggers associated with patient's emotions.  Also discussed coping skills for patient to implement. Discussed difficulties with adjusting schedules following working 4 days of night shifts and appointments increasing during her 3 days off.  Processed through thoughts and emotions surrounding recent attempt at returning to school, managing work life balance, and financial stressors.  Discussed continued work towards identifying short-term goals, ways to improve time management, and increasing self-care.    Allowed patient to freely discuss issues without interruption or judgment. Provided safe, confidential environment to facilitate the development of positive therapeutic relationship and encourage open, honest communication. Assisted patient in identifying risk factors which would indicate the need for higher level of care including thoughts to harm self or others and/or self-harming behavior and encouraged patient to contact this office, call 911, or present to the nearest emergency room should any of these events occur. Discussed crisis intervention services and means to access. Patient adamantly and convincingly denies current suicidal or homicidal ideation or perceptual disturbance.    Assessment:   Assessment   Patient appears to maintain relative stability as compared to their baseline.  However, patient continues to struggle with anxiety which continues to cause  impairment in important areas of functioning.  A result, they can be reasonably expected to continue to benefit from treatment and would likely be at increased risk for decompensation otherwise.    Mental Status Exam:   Hygiene:   good  Cooperation:  Cooperative  Eye Contact:  Good  Psychomotor Behavior:  Appropriate  Affect:  Full range  Mood: fluctates  Speech:  Normal  Thought Process:  Linear  Thought Content:  Mood congruent  Suicidal:  None  Homicidal:  None  Hallucinations:  None  Delusion:  None  Memory:  Intact  Orientation:  Person, Place, Time and Situation  Reliability:  fair  Insight:  Fair  Judgement:  Fair  Impulse Control:  Fair  Physical/Medical Issues:  No        Patient's Support Network Includes:  , son, and extended family    Functional Status: Mild impairment     Progress toward goal: Patient is working towards practicing the ABC's of CBT model while at home to process through and correct or improve cognitions. Patient is making progress on processing thoughts and emotions during sessions, utilizing positive thinking strategies, daily positive affirmations and self care practices.     Prognosis: Good with Ongoing Treatment            Plan:    Patient will continue in individual outpatient therapy with focus on improved functioning and coping skills, maintaining stability, and avoiding decompensation and the need for higher level of care.    Patient will adhere to medication regimen as prescribed and report any side effects. Patient will contact this office, call 911 or present to the nearest emergency room should suicidal or homicidal ideations occur. Provide Cognitive Behavioral Therapy and Solution Focused Therapy to improve functioning, maintain stability, and avoid decompensation and the need for higher level of care.     Return in about 2 weeks, or earlier if symptoms worsen or fail to improve.           VISIT DIAGNOSIS:     ICD-10-CM ICD-9-CM   1. Anxiety  F41.9 300.00         Diagnoses and all orders for this visit:    1. Anxiety (Primary)           [facility] No Show Policy:  We understand unexpected circumstances arise; however, anytime you miss your appointment we are unable to provide you appropriate care.  In addition, each appointment missed could have been used to provide care for others.  We ask that you call at least 24 hours in advance to cancel or reschedule an appointment.  We would like to take this opportunity to remind you of our policy stating patients who miss THREE or more appointments without cancelling or rescheduling 24 hours in advance of the appointment may be subject to cancellation of any further visits with our clinic and recommendation to seek in-person services/visits.    Please call [phone] to reschedule your appointment. If there are reasons that make it difficult for you to keep the appointments, please call and let us know how we can help.  Please understand that medication prescribing will not continue without seeing your provider.      [facility]'s No Show Policy reviewed with patient at today's visit. Patient verbalized understanding of this policy. Discussed with patient that in the event that there are three or more no show visits, it will be recommended that they pursue in-person services/visits as noncompliance with telehealth visits indicates that patient is not an appropriate candidate for telemedicine and would likely be more appropriate for in-person services/visits. Patient verbalizes understanding and is agreeable to this.       This document has been electronically signed by Jb Moore III, LCSW  November 29, 2023 11:52 EST      Part of this note may be an electronic transcription/translation of spoken language to printed text using the Dragon Dictation System.

## 2023-12-28 ENCOUNTER — TELEPHONE (OUTPATIENT)
Dept: FAMILY MEDICINE CLINIC | Facility: CLINIC | Age: 48
End: 2023-12-28
Payer: COMMERCIAL

## 2023-12-28 DIAGNOSIS — M54.50 CHRONIC LEFT-SIDED LOW BACK PAIN WITHOUT SCIATICA: ICD-10-CM

## 2023-12-28 DIAGNOSIS — G56.01 CARPAL TUNNEL SYNDROME OF RIGHT WRIST: Primary | ICD-10-CM

## 2023-12-28 DIAGNOSIS — M54.2 CHRONIC NECK PAIN: ICD-10-CM

## 2023-12-28 DIAGNOSIS — G89.29 CHRONIC NECK PAIN: ICD-10-CM

## 2023-12-28 DIAGNOSIS — G89.29 CHRONIC LEFT-SIDED LOW BACK PAIN WITHOUT SCIATICA: ICD-10-CM

## 2023-12-28 DIAGNOSIS — M54.2 CERVICALGIA: ICD-10-CM

## 2023-12-28 NOTE — TELEPHONE ENCOUNTER
[facility] called to say the patient is updating her insurance in January (to Seth; card in documents). They are requesting an updated, signed referral that reflects the insurance change. Their call back number is [phone].

## 2024-01-03 ENCOUNTER — OFFICE VISIT (OUTPATIENT)
Dept: FAMILY MEDICINE CLINIC | Facility: CLINIC | Age: 49
End: 2024-01-03
Payer: COMMERCIAL

## 2024-01-03 VITALS
DIASTOLIC BLOOD PRESSURE: 96 MMHG | OXYGEN SATURATION: 98 % | BODY MASS INDEX: 35.47 KG/M2 | SYSTOLIC BLOOD PRESSURE: 128 MMHG | HEART RATE: 78 BPM | HEIGHT: 67 IN | WEIGHT: 226 LBS

## 2024-01-03 DIAGNOSIS — E03.8 HYPOTHYROIDISM DUE TO HASHIMOTO'S THYROIDITIS: ICD-10-CM

## 2024-01-03 DIAGNOSIS — Z78.0 MENOPAUSE: ICD-10-CM

## 2024-01-03 DIAGNOSIS — I10 PRIMARY HYPERTENSION: ICD-10-CM

## 2024-01-03 DIAGNOSIS — Z00.00 ANNUAL PHYSICAL EXAM: Primary | ICD-10-CM

## 2024-01-03 DIAGNOSIS — Z12.4 CERVICAL CANCER SCREENING: ICD-10-CM

## 2024-01-03 DIAGNOSIS — Z11.59 NEED FOR HEPATITIS C SCREENING TEST: ICD-10-CM

## 2024-01-03 DIAGNOSIS — E06.3 HYPOTHYROIDISM DUE TO HASHIMOTO'S THYROIDITIS: ICD-10-CM

## 2024-01-03 DIAGNOSIS — Z13.220 SCREENING FOR CHOLESTEROL LEVEL: ICD-10-CM

## 2024-01-03 DIAGNOSIS — F41.9 ANXIETY: ICD-10-CM

## 2024-01-03 RX ORDER — MELOXICAM 7.5 MG/1
7.5-15 TABLET ORAL DAILY PRN
COMMUNITY

## 2024-01-03 RX ORDER — LOSARTAN POTASSIUM 100 MG/1
100 TABLET ORAL DAILY
Qty: 30 TABLET | Refills: 5 | Status: SHIPPED | OUTPATIENT
Start: 2024-01-03

## 2024-01-03 RX ORDER — BUSPIRONE HYDROCHLORIDE 5 MG/1
10 TABLET ORAL 2 TIMES DAILY PRN
Qty: 120 TABLET | Refills: 2 | Status: SHIPPED | OUTPATIENT
Start: 2024-01-03

## 2024-01-03 NOTE — PROGRESS NOTES
Established Office Visit      Patient Name: Jeannette Aviles  : 1975   MRN: [id number]   Care Team: Patient Care Team:  Adelaide Mc DO as PCP - General (Internal Medicine)  Marialuisa Lu MD as Consulting Physician (Endocrinology)    Chief Complaint:    Chief Complaint   Patient presents with   • Annual Exam       History of Present Illness: Jeannette Aviles is a 48 y.o. female with anxiety/depression, dermatitis, hypothyroidism, HTN, migraine without aura, chronic low back pain 2/2 lumbar DDD who is here today to follow up with anxiety. She is still working at [employer]. She is also here for her annual.     Reports she is having more anxiety and panic attacks. She has rx for hydroxyzine and reports this is not very helpful. Insurance no longer covers her therapist and she will have to find alternative provider. She does feel cymbalta is helpful.     Hypothyroidism - TFTs 2023 wnl. She is compliant with synthroid - taking 112mcg daily 6 days of the week. One day of the week she takes 2 tablets (total 224mcg)    HTN - she is compliant with losartan 100mg.     Subjective      Review of Systems:   Review of Systems - See HPI    I have reviewed and the following portions of the patient's history were updated as appropriate: past family history, past medical history, past social history, past surgical history and problem list.    Medications:     Current Outpatient Medications:   •  DULoxetine (CYMBALTA) 60 MG capsule, TAKE ONE CAPSULE BY MOUTH DAILY, Disp: 90 capsule, Rfl: 1  •  famotidine (PEPCID) 40 MG tablet, Take 1 tablet by mouth 2 (Two) Times a Day., Disp: 60 tablet, Rfl: 5  •  fexofenadine (ALLEGRA) 180 MG tablet, Take 1 tablet by mouth 2 (Two) Times a Day., Disp: 90 tablet, Rfl: 1  •  levothyroxine (SYNTHROID, LEVOTHROID) 125 MCG tablet, Take 1 tablet by mouth Daily., Disp: 30 tablet, Rfl: 3  •  losartan (COZAAR) 100 MG tablet, Take 1 tablet by mouth Daily., Disp: 30 tablet, Rfl: 5  •   "vitamin B-12 (CYANOCOBALAMIN) 1000 MCG tablet, Take 1 tablet by mouth Daily., Disp: 90 tablet, Rfl: 3  •  busPIRone (BUSPAR) 5 MG tablet, Take 2 tablets by mouth 2 (Two) Times a Day As Needed (anxiety)., Disp: 120 tablet, Rfl: 2  •  meloxicam (MOBIC) 7.5 MG tablet, Take 1-2 tablets by mouth Daily As Needed for Mild Pain., Disp: , Rfl:     Allergies:   Allergies   Allergen Reactions   • Aspartame Unknown - High Severity   • Buspirone Dizziness   • Corticosteroids Hives and Swelling   • Latex Hives       Objective     Physical Exam:  Vital Signs:   Vitals:    01/03/24 0911   BP: 128/96   Pulse: 78   SpO2: 98%   Weight: 103 kg (226 lb)   Height: 170.2 cm (67\")     Body mass index is 35.4 kg/m².     Physical Exam  Vitals reviewed. Exam conducted with a chaperone present.   Constitutional:       Appearance: Normal appearance. She is not ill-appearing.   Cardiovascular:      Rate and Rhythm: Normal rate.      Heart sounds: Normal heart sounds.   Pulmonary:      Effort: Pulmonary effort is normal. No respiratory distress.      Breath sounds: Normal breath sounds.   Genitourinary:     Vagina: Normal.      Cervix: Normal.   Skin:     General: Skin is warm and dry.   Neurological:      Mental Status: She is alert.   Psychiatric:         Mood and Affect: Mood normal.         Behavior: Behavior normal.         Judgment: Judgment normal.         Assessment / Plan      Assessment/Plan:   Problems Addressed This Visit  Diagnoses and all orders for this visit:    1. Annual physical exam (Primary)  -     Hepatitis C antibody; Future  -     Lipid Panel w/ Chol/HDL Ratio; Future  -     TSH Rfx On Abnormal To Free T4; Future  -     Comprehensive metabolic panel; Future  -     CBC No Differential; Future    Healthcare Maintenance   Vaccines:  Encouraged covid booster at pharmacy    Cancer Screening  -Mammogram BIRADS1 4/2023 - repeat 2024   -Colonoscopy 5/4/2023 - repeat in 2026  -Pap smear today    Other  PHQ-2 Depression " Screening  Little interest or pleasure in doing things? 0-->not at all   Feeling down, depressed, or hopeless? 0-->not at all   PHQ-2 Total Score 0     -Counseled on importance of maintaining healthy diet and routine exercise. Encouraged 150 min exercise per week.  -Sexual Health: declines STD screening   -Blood pressure goal <130/80. BP today is above goal (128/96). Encouraged her to monitor this at home and let me know if persistently elevated.  -Metabolic screening today    2. Need for hepatitis C screening test  -     Hepatitis C antibody; Future    3. Anxiety  Comments:  Uncontrolled. Continue Cymbalta. Stop Hydroxyzine (ineffective). Start Buspar 10mg BID prn  Orders:  -     busPIRone (BUSPAR) 5 MG tablet; Take 2 tablets by mouth 2 (Two) Times a Day As Needed (anxiety).  Dispense: 120 tablet; Refill: 2    4. Hypothyroidism due to Hashimoto's thyroiditis  Recheck TFTs today and adjust synthroid if needed    5. Primary hypertension  -     losartan (COZAAR) 100 MG tablet; Take 1 tablet by mouth Daily.  Dispense: 30 tablet; Refill: 5    6. Cervical cancer screening  -     LIQUID-BASED PAP SMEAR WITH HPV GENOTYPING REGARDLESS OF INTERPRETATION (FOZIA,COR,MAD); Future    7. Menopause  This month she has officially been without menstrual cycle for 12 months.     8. Screening for cholesterol level  -     Lipid Panel w/ Chol/HDL Ratio; Future          Plan of care reviewed with patient at the conclusion of today's visit. Education was provided regarding diagnosis and management.  Patient verbalizes understanding of and agreement with management plan.    Follow Up: 3 months for anxiety   No follow-ups on file.        DO RADHA Mackay RD  [facility]  [address]  [facility] [phone]  Fax [phone]  Phone [phone]

## 2024-01-08 LAB — REF LAB TEST METHOD: NORMAL

## 2024-01-11 ENCOUNTER — PATIENT MESSAGE (OUTPATIENT)
Dept: FAMILY MEDICINE CLINIC | Facility: CLINIC | Age: 49
End: 2024-01-11

## 2024-01-11 DIAGNOSIS — I10 PRIMARY HYPERTENSION: Primary | ICD-10-CM

## 2024-01-11 RX ORDER — AMLODIPINE BESYLATE 5 MG/1
5 TABLET ORAL DAILY
Qty: 30 TABLET | Refills: 5 | Status: SHIPPED | OUTPATIENT
Start: 2024-01-11

## 2024-01-12 RX ORDER — MELOXICAM 7.5 MG/1
7.5-15 TABLET ORAL DAILY PRN
OUTPATIENT
Start: 2024-01-12

## 2024-01-12 RX ORDER — MELOXICAM 7.5 MG/1
TABLET ORAL
Qty: 60 TABLET | Refills: 2 | Status: SHIPPED | OUTPATIENT
Start: 2024-01-12

## 2024-01-12 NOTE — TELEPHONE ENCOUNTER
Rx Refill Note  Requested Prescriptions     Pending Prescriptions Disp Refills    meloxicam (MOBIC) 7.5 MG tablet [Pharmacy Med Name: MELOXICAM 7.5 MG TABLET] 60 tablet      Sig: TAKE ONE TO TWO TABLETS BY MOUTH EVERY DAY FOR PAIN      Last office visit with prescribing clinician: 11/17/2023   Last telemedicine visit with prescribing clinician: Visit date not found   Next office visit with prescribing clinician: Visit date not found                         Would you like a call back once the refill request has been completed: [] Yes [] No    If the office needs to give you a call back, can they leave a voicemail: [] Yes [] No    Nicole Flaherty MA  01/12/24, 07:38 EST

## 2024-01-21 ENCOUNTER — PATIENT MESSAGE (OUTPATIENT)
Dept: FAMILY MEDICINE CLINIC | Facility: CLINIC | Age: 49
End: 2024-01-21
Payer: COMMERCIAL

## 2024-01-21 DIAGNOSIS — F32.A ANXIETY AND DEPRESSION: ICD-10-CM

## 2024-01-21 DIAGNOSIS — F41.9 ANXIETY AND DEPRESSION: ICD-10-CM

## 2024-01-21 DIAGNOSIS — G56.01 CARPAL TUNNEL SYNDROME OF RIGHT WRIST: ICD-10-CM

## 2024-01-21 DIAGNOSIS — L29.9 ITCHY SKIN: ICD-10-CM

## 2024-01-22 RX ORDER — LEVOTHYROXINE SODIUM 0.12 MG/1
125 TABLET ORAL DAILY
Qty: 90 TABLET | Refills: 1 | Status: SHIPPED | OUTPATIENT
Start: 2024-01-22

## 2024-01-22 RX ORDER — FEXOFENADINE HCL 180 MG/1
180 TABLET ORAL 2 TIMES DAILY
Qty: 90 TABLET | Refills: 1 | Status: SHIPPED | OUTPATIENT
Start: 2024-01-22

## 2024-01-22 RX ORDER — DULOXETIN HYDROCHLORIDE 60 MG/1
60 CAPSULE, DELAYED RELEASE ORAL DAILY
Qty: 90 CAPSULE | Refills: 1 | Status: SHIPPED | OUTPATIENT
Start: 2024-01-22

## 2024-01-26 DIAGNOSIS — I10 PRIMARY HYPERTENSION: ICD-10-CM

## 2024-01-26 RX ORDER — LOSARTAN POTASSIUM 100 MG/1
100 TABLET ORAL DAILY
Qty: 30 TABLET | Refills: 5 | Status: SHIPPED | OUTPATIENT
Start: 2024-01-26

## 2024-01-26 NOTE — TELEPHONE ENCOUNTER
Rx Refill Note  Requested Prescriptions     Pending Prescriptions Disp Refills    losartan (COZAAR) 100 MG tablet [Pharmacy Med Name: LOSARTAN POTASSIUM 100 MG TAB] 30 tablet 5     Sig: TAKE ONE TABLET BY MOUTH DAILY      Last office visit with prescribing clinician: 1/3/2024   Last telemedicine visit with prescribing clinician: Visit date not found   Next office visit with prescribing clinician: Visit date not found                         Would you like a call back once the refill request has been completed: [] Yes [] No    If the office needs to give you a call back, can they leave a voicemail: [] Yes [] No    Nicole Flaherty MA  01/26/24, 07:42 EST

## 2024-02-10 DIAGNOSIS — E03.8 HYPOTHYROIDISM DUE TO HASHIMOTO'S THYROIDITIS: ICD-10-CM

## 2024-02-10 DIAGNOSIS — E06.3 HYPOTHYROIDISM DUE TO HASHIMOTO'S THYROIDITIS: ICD-10-CM

## 2024-02-12 RX ORDER — LEVOTHYROXINE SODIUM 112 UG/1
112 CAPSULE ORAL DAILY
Qty: 90 CAPSULE | OUTPATIENT
Start: 2024-02-12

## 2024-02-14 DIAGNOSIS — D69.6 THROMBOCYTOPENIA: Primary | ICD-10-CM

## 2024-02-20 DIAGNOSIS — E06.3 HYPOTHYROIDISM DUE TO HASHIMOTO'S THYROIDITIS: ICD-10-CM

## 2024-02-20 DIAGNOSIS — E03.8 HYPOTHYROIDISM DUE TO HASHIMOTO'S THYROIDITIS: ICD-10-CM

## 2024-02-20 RX ORDER — LEVOTHYROXINE SODIUM 112 UG/1
112 CAPSULE ORAL DAILY
Qty: 90 CAPSULE | OUTPATIENT
Start: 2024-02-20

## 2024-02-24 DIAGNOSIS — L29.9 ITCHY SKIN: ICD-10-CM

## 2024-02-26 RX ORDER — FAMOTIDINE 40 MG/1
40 TABLET, FILM COATED ORAL 2 TIMES DAILY
Qty: 180 TABLET | Refills: 1 | Status: SHIPPED | OUTPATIENT
Start: 2024-02-26

## 2024-02-27 ENCOUNTER — OFFICE VISIT (OUTPATIENT)
Dept: FAMILY MEDICINE CLINIC | Facility: CLINIC | Age: 49
End: 2024-02-27
Payer: COMMERCIAL

## 2024-02-27 VITALS
HEART RATE: 101 BPM | SYSTOLIC BLOOD PRESSURE: 126 MMHG | WEIGHT: 222.4 LBS | OXYGEN SATURATION: 97 % | BODY MASS INDEX: 34.91 KG/M2 | DIASTOLIC BLOOD PRESSURE: 84 MMHG | HEIGHT: 67 IN

## 2024-02-27 DIAGNOSIS — E78.00 PURE HYPERCHOLESTEROLEMIA: ICD-10-CM

## 2024-02-27 DIAGNOSIS — Z12.31 SCREENING MAMMOGRAM FOR BREAST CANCER: ICD-10-CM

## 2024-02-27 DIAGNOSIS — F41.9 ANXIETY AND DEPRESSION: ICD-10-CM

## 2024-02-27 DIAGNOSIS — I10 PRIMARY HYPERTENSION: ICD-10-CM

## 2024-02-27 DIAGNOSIS — R06.09 DYSPNEA ON EXERTION: Primary | ICD-10-CM

## 2024-02-27 DIAGNOSIS — E66.9 CLASS 1 OBESITY WITHOUT SERIOUS COMORBIDITY WITH BODY MASS INDEX (BMI) OF 34.0 TO 34.9 IN ADULT, UNSPECIFIED OBESITY TYPE: ICD-10-CM

## 2024-02-27 DIAGNOSIS — F32.A ANXIETY AND DEPRESSION: ICD-10-CM

## 2024-02-27 DIAGNOSIS — Z82.49 FAMILY HISTORY OF CORONARY ARTERY DISEASE: ICD-10-CM

## 2024-02-27 NOTE — PROGRESS NOTES
Established Office Visit      Patient Name: Jeannette Aviles  : 1975   MRN: [id number]   Care Team: Patient Care Team:  Adelaide Mc DO as PCP - General (Internal Medicine)  Marialuisa Lu MD as Consulting Physician (Endocrinology)    Chief Complaint:    Chief Complaint   Patient presents with    Shortness of Breath     X20 minutes     Dizziness    Headache    Nausea       History of Present Illness: Jeannette Aviles is a 49 y.o. female  with anxiety/depression, dermatitis, hypothyroidism, HTN, migraine without aura, chronic low back pain 2/2 lumbar DDD who is here today to follow up with SOA, HTN and anxiety.    Buspar 10mg BID was added about 2 months ago but this was on prn basis and she has not had to use it.    HTN - compliant with losartan. Tried adding amlodipine 5mg daily but this caused peripheral edema. This was switched to HCTZ about 3 days ago. She is tolerating this well so far.     Patient reports >1 year of intermittent episodes of winded spells with exertion such as walking up stairs. This typically improves with resting and taking a few deep breaths. With these episodes she occasionally feels light headed for a few moments. She typically is not concerned with these episodes as they resolve so quickly but recently had an episode on 24 while she was coming up the stairs. She rested and felt SOA persist for >15 minutes. She also felt dizzy, hot, sweaty, nausea, hands were trembling somewhat. No chest pain, pressure, or vomiting.   /92 during that episode but improved to 116/82 with time. HR went from 128 to 98.    She reports family history of CAD (father, age 70) and paternal aunt (age 70).       Subjective      Review of Systems:   Review of Systems - See HPI    I have reviewed and the following portions of the patient's history were updated as appropriate: past family history, past medical history, past social history, past surgical history and problem  "list.    Medications:     Current Outpatient Medications:     busPIRone (BUSPAR) 5 MG tablet, Take 2 tablets by mouth 2 (Two) Times a Day As Needed (anxiety)., Disp: 120 tablet, Rfl: 2    DULoxetine (CYMBALTA) 60 MG capsule, Take 1 capsule by mouth Daily., Disp: 90 capsule, Rfl: 1    famotidine (PEPCID) 40 MG tablet, Take 1 tablet by mouth 2 (Two) Times a Day., Disp: 180 tablet, Rfl: 1    fexofenadine (ALLEGRA) 180 MG tablet, Take 1 tablet by mouth 2 (Two) Times a Day., Disp: 90 tablet, Rfl: 1    hydroCHLOROthiazide 12.5 MG tablet, Take 1 tablet by mouth Daily., Disp: 30 tablet, Rfl: 2    levothyroxine (SYNTHROID, LEVOTHROID) 125 MCG tablet, Take 1 tablet by mouth Daily., Disp: 90 tablet, Rfl: 1    losartan (COZAAR) 100 MG tablet, TAKE ONE TABLET BY MOUTH DAILY, Disp: 30 tablet, Rfl: 5    meloxicam (MOBIC) 7.5 MG tablet, TAKE ONE TO TWO TABLETS BY MOUTH EVERY DAY FOR PAIN, Disp: 60 tablet, Rfl: 2    vitamin B-12 (CYANOCOBALAMIN) 1000 MCG tablet, Take 1 tablet by mouth Daily., Disp: 90 tablet, Rfl: 3    Allergies:   Allergies   Allergen Reactions    Aspartame Unknown - High Severity    Buspirone Dizziness    Corticosteroids Hives and Swelling    Latex Hives       Objective     Physical Exam:  Vital Signs:   Vitals:    02/27/24 0812   BP: 126/84   Pulse: 101   SpO2: 97%   Weight: 101 kg (222 lb 6.4 oz)   Height: 170.2 cm (67\")     Body mass index is 34.83 kg/m².     Physical Exam  Vitals reviewed.   Constitutional:       Appearance: Normal appearance.   Cardiovascular:      Rate and Rhythm: Normal rate and regular rhythm.      Pulses: Normal pulses.      Heart sounds: Normal heart sounds.   Pulmonary:      Effort: Pulmonary effort is normal. No respiratory distress.      Breath sounds: Normal breath sounds. No wheezing.   Skin:     General: Skin is warm and dry.   Neurological:      Mental Status: She is alert.   Psychiatric:         Mood and Affect: Mood normal.         Behavior: Behavior normal.         Judgment: " Judgment normal.       ECG 12 Lead    Date/Time: 2/27/2024 2:22 PM  Performed by: Adelaide Mc DO    Authorized by: Adelaide Mc DO  Comparison: not compared with previous ECG   Previous ECG: no previous ECG available  Rhythm: sinus rhythm  Rate: normal  QRS axis: normal  Other findings: left ventricular hypertrophy    Clinical impression: non-specific ECG            Assessment / Plan      Assessment/Plan:   Problems Addressed This Visit  Diagnoses and all orders for this visit:    1. Dyspnea on exertion (Primary)  -     Ambulatory Referral to Cardiology  -     ECG 12 Lead    EKG today NSR with +LVH, no prior EKG for comparison. Given clinical history as detailed in HPI and family history of CAD, I do feel she would benefit from further cardiac evaluation with echo and stress. Referred to cardiology.     Discussed importance of optimizing cardiac risk factors such as weight, HLD, HTN.  HTN well controlled    on most recent labs - encouraged consideration of LDL lower therapy. She will consider low intensity statin atorvastatin 10mg daily and let me know. Otherwise continue diet low in saturated fats, exercise regularly.   Obesity - referred to weight management     2. Family history of coronary artery disease  -     Ambulatory Referral to Cardiology  -     ECG 12 Lead    3. Screening mammogram for breast cancer  -     Mammo Screening Digital Tomosynthesis Bilateral With CAD; Future    4. Anxiety and depression  Comments:  Stable. Continue Buspar 10mg BID prn - has not tried this yet    5. Primary hypertension  Comments:  Improved with addition of HCTZ. Continue this along with Losartan 100mg daily    6. Class 1 obesity without serious comorbidity with body mass index (BMI) of 34.0 to 34.9 in adult, unspecified obesity type  -     Ambulatory Referral to Weight Management Program    7. Pure hypercholesterolemia  -     Ambulatory Referral to Cardiology        Plan of care reviewed with patient at the  conclusion of today's visit. Education was provided regarding diagnosis and management.  Patient verbalizes understanding of and agreement with management plan.    Follow Up:   No follow-ups on file.        DO RADHA Mackay RD  [facility]  [address]  [facility] [phone]  Fax [phone]  Phone [phone]

## 2024-03-07 ENCOUNTER — OFFICE VISIT (OUTPATIENT)
Dept: CARDIOLOGY | Facility: CLINIC | Age: 49
End: 2024-03-07
Payer: COMMERCIAL

## 2024-03-07 ENCOUNTER — LAB (OUTPATIENT)
Dept: LAB | Facility: HOSPITAL | Age: 49
End: 2024-03-07
Payer: COMMERCIAL

## 2024-03-07 VITALS
OXYGEN SATURATION: 98 % | HEIGHT: 67 IN | SYSTOLIC BLOOD PRESSURE: 102 MMHG | DIASTOLIC BLOOD PRESSURE: 72 MMHG | BODY MASS INDEX: 34.88 KG/M2 | WEIGHT: 222.2 LBS | HEART RATE: 105 BPM

## 2024-03-07 DIAGNOSIS — R07.2 PRECORDIAL CHEST PAIN: ICD-10-CM

## 2024-03-07 DIAGNOSIS — I10 HYPERTENSION, ESSENTIAL: ICD-10-CM

## 2024-03-07 DIAGNOSIS — R06.09 DOE (DYSPNEA ON EXERTION): ICD-10-CM

## 2024-03-07 DIAGNOSIS — E78.2 MIXED HYPERLIPIDEMIA: ICD-10-CM

## 2024-03-07 DIAGNOSIS — D69.6 THROMBOCYTOPENIA: ICD-10-CM

## 2024-03-07 DIAGNOSIS — R06.09 DOE (DYSPNEA ON EXERTION): Primary | ICD-10-CM

## 2024-03-07 LAB
BASOPHILS # BLD AUTO: 0.08 10*3/MM3 (ref 0–0.2)
BASOPHILS NFR BLD AUTO: 1.1 % (ref 0–1.5)
DEPRECATED RDW RBC AUTO: 41.2 FL (ref 37–54)
EOSINOPHIL # BLD AUTO: 0.29 10*3/MM3 (ref 0–0.4)
EOSINOPHIL NFR BLD AUTO: 4.1 % (ref 0.3–6.2)
ERYTHROCYTE [DISTWIDTH] IN BLOOD BY AUTOMATED COUNT: 13.3 % (ref 12.3–15.4)
HCT VFR BLD AUTO: 43.5 % (ref 34–46.6)
HGB BLD-MCNC: 14.8 G/DL (ref 12–15.9)
LYMPHOCYTES # BLD AUTO: 1.73 10*3/MM3 (ref 0.7–3.1)
LYMPHOCYTES NFR BLD AUTO: 24.2 % (ref 19.6–45.3)
MCH RBC QN AUTO: 29 PG (ref 26.6–33)
MCHC RBC AUTO-ENTMCNC: 34 G/DL (ref 31.5–35.7)
MCV RBC AUTO: 85.1 FL (ref 79–97)
MONOCYTES # BLD AUTO: 0.68 10*3/MM3 (ref 0.1–0.9)
MONOCYTES NFR BLD AUTO: 9.5 % (ref 5–12)
NEUTROPHILS NFR BLD AUTO: 4.35 10*3/MM3 (ref 1.7–7)
NEUTROPHILS NFR BLD AUTO: 60.7 % (ref 42.7–76)
PLATELET # BLD AUTO: 227 10*3/MM3 (ref 140–450)
PMV BLD AUTO: 10.4 FL (ref 6–12)
RBC # BLD AUTO: 5.11 10*6/MM3 (ref 3.77–5.28)
TROPONIN T SERPL HS-MCNC: <6 NG/L
WBC NRBC COR # BLD AUTO: 7.16 10*3/MM3 (ref 3.4–10.8)

## 2024-03-07 PROCEDURE — 85025 COMPLETE CBC W/AUTO DIFF WBC: CPT

## 2024-03-07 PROCEDURE — 84484 ASSAY OF TROPONIN QUANT: CPT

## 2024-03-07 PROCEDURE — 36415 COLL VENOUS BLD VENIPUNCTURE: CPT

## 2024-03-07 PROCEDURE — 99204 OFFICE O/P NEW MOD 45 MIN: CPT | Performed by: INTERNAL MEDICINE

## 2024-03-07 RX ORDER — ROSUVASTATIN CALCIUM 5 MG/1
5 TABLET, COATED ORAL DAILY
Qty: 90 TABLET | Refills: 3 | Status: SHIPPED | OUTPATIENT
Start: 2024-03-07

## 2024-03-07 RX ORDER — METOPROLOL TARTRATE 50 MG/1
TABLET, FILM COATED ORAL
Qty: 4 TABLET | Refills: 0 | Status: SHIPPED | OUTPATIENT
Start: 2024-03-07

## 2024-03-07 NOTE — PROGRESS NOTES
OFFICE VISIT  NOTE  [facility] CARDIOLOGY      Name: Jeannette Aviles    Date: 3/7/2024  MRN:  [id number]  :  1975      REFERRING/PRIMARY PROVIDER:  Adelaide Mc DO    Chief Complaint   Patient presents with    PAIGE    Hypertension       HPI: Jeannette Aviles is a 49 y.o. female who presents for chest pain and shortness of breath.  I take care of her mother Ingrid Laguna.  She has a strong family history of premature CAD both mother and father.  She has a longstanding history of hyperlipidemia as well as hypertension.  She reports an episode about a week ago significant dyspnea on 2024, walking upstairs, it was worse than usual, lasted 15 to 20 minutes, she waited to see if she would get arm or back pain but it did not occur therefore she did not go to the ER.  She went to her PCP on , EKG there showed normal sinus rhythm with possible LVH.  She is concerned about possible premature CAD as well as the LVH.    Past Medical History:   Diagnosis Date    Allergic 2019    latex, topical corticosteroids, sugar substitutes    Anxiety     Arthritis     lumbar spine, right big toe/ hallux rigidus    Depression     Headache     History of medical problems ,     PMDD, pelvic floor weakness    Hypertension 2020    Hypothyroidism 2015    Hashimoto's Disease    Low back pain 2001    Neuromuscular disorder 2020    cubital and carpal tunnel symptoms    Obesity 2001       Past Surgical History:   Procedure Laterality Date    COLONOSCOPY  2023 May 4th    three pre-cancerous polyps removed       Social History     Socioeconomic History    Marital status:    Tobacco Use    Smoking status: Never    Smokeless tobacco: Never    Tobacco comments:     Strongly anti-smoker   Vaping Use    Vaping status: Never Used    Passive vaping exposure: Yes   Substance and Sexual Activity    Alcohol use: Not Currently     Comment: maybe 5 times a year, not much to speak of  really    Drug use: Never    Sexual activity: Yes     Partners: Male     Birth control/protection: None     Comment: secondary infertility, miscarriage       Family History   Problem Relation Age of Onset    Arthritis Mother         RA and Lupus/SLE    Depression Mother     Thyroid disease Mother         hypothyroidism    Lupus Mother     Hypertension Father     Anxiety disorder Father     Heart disease Father         heart attack at 71yo    Hyperlipidemia Father         since his 20s    Stroke Father         several minor strokes in his 60s    Thyroid disease Father         hyperthyroidism    Heart attack Father     Post-traumatic stress disorder Brother     Other Brother         tramatic brain injury    Alcohol abuse Brother     Drug abuse Brother     Cancer Maternal Aunt     Thyroid disease Maternal Aunt         hypothyroidism    Skin cancer Maternal Aunt     Early death Maternal Uncle         car accident at 18- I know, not a health issue, but you asked.    Heart attack Paternal Aunt     Diabetes Paternal Aunt     Heart disease Paternal Aunt         fatal heart attack at 71yo, had had previous (I think a stent?)    Thyroid disease Paternal Aunt         hypo, I think    Other Paternal Aunt         endometriosis    COPD Maternal Grandmother         lifelong smoker    Cancer Maternal Grandfather         lifelong smoker, lung cancer    Lung cancer Maternal Grandfather     Breast cancer Paternal Grandmother     Cancer Paternal Grandmother         breast cancer- her whole family had cancer of some type    Early death Paternal Grandmother         breast cancer at 58 years old    Hypertension Paternal Grandfather     Hyperlipidemia Paternal Grandfather     Liver disease Paternal Grandfather          at 68    Stroke Paternal Grandfather         sever impairment afterwards, had to live in care facility    Hyperlipidemia Son     Anxiety disorder Son         also autistic    Depression Son     Developmental Disability  Son         autism    Liver disease Son         NAFLD/ BECK at 20 years old    Mental illness Son         are we counting autism here?    Autism Son     Ovarian cancer Neg Hx         ROS:   Constitutional no fever,  no weight loss   Skin no rash, no subcutaneous nodules   Otolaryngeal no difficulty swallowing   Cardiovascular See HPI   Pulmonary no cough, no sputum production   Gastrointestinal no constipation, no diarrhea   Genitourinary no dysuria, no hematuria   Hematologic no easy bruisability, no abnormal bleeding   Musculoskeletal no muscle pain   Neurologic no dizziness, no falls         Allergies   Allergen Reactions    Aspartame Unknown - High Severity    Buspirone Dizziness    Corticosteroids Hives and Swelling    Latex Hives         Current Outpatient Medications:     DULoxetine (CYMBALTA) 60 MG capsule, Take 1 capsule by mouth Daily., Disp: 90 capsule, Rfl: 1    famotidine (PEPCID) 40 MG tablet, Take 1 tablet by mouth 2 (Two) Times a Day., Disp: 180 tablet, Rfl: 1    fexofenadine (ALLEGRA) 180 MG tablet, Take 1 tablet by mouth 2 (Two) Times a Day., Disp: 90 tablet, Rfl: 1    hydroCHLOROthiazide 12.5 MG tablet, Take 1 tablet by mouth Daily., Disp: 30 tablet, Rfl: 2    levothyroxine (SYNTHROID, LEVOTHROID) 125 MCG tablet, Take 1 tablet by mouth Daily., Disp: 90 tablet, Rfl: 1    losartan (COZAAR) 100 MG tablet, TAKE ONE TABLET BY MOUTH DAILY, Disp: 30 tablet, Rfl: 5    meloxicam (MOBIC) 7.5 MG tablet, TAKE ONE TO TWO TABLETS BY MOUTH EVERY DAY FOR PAIN, Disp: 60 tablet, Rfl: 2    vitamin B-12 (CYANOCOBALAMIN) 1000 MCG tablet, Take 1 tablet by mouth Daily., Disp: 90 tablet, Rfl: 3    metoprolol tartrate (LOPRESSOR) 50 MG tablet, Take 1 tablet the night before CT angiography.  One hour before CT scan take 1 tablet if heart rate <75 bpm and 2 tablets if >75 bpm, Disp: 4 tablet, Rfl: 0    rosuvastatin (CRESTOR) 5 MG tablet, Take 1 tablet by mouth Daily., Disp: 90 tablet, Rfl: 3    Vitals:    03/07/24 1058  "  BP: 102/72   BP Location: Right arm   Patient Position: Sitting   Pulse: 105   SpO2: 98%   Weight: 101 kg (222 lb 3.2 oz)   Height: 170.2 cm (67\")     Body mass index is 34.8 kg/m².    PHYSICAL EXAM:    General Appearance:   well developed  well nourished  HENT:   oropharynx moist  lips not cyanotic    xanthelasma  Neck:  thyroid not enlarged  supple  Respiratory:  no respiratory distress  normal breath sounds  no rales  Cardiovascular:  no jugular venous distention  regular rhythm  apical impulse normal  S1 normal, S2 normal  no S3, no S4   no murmur  no rub, no thrill  carotid pulses normal; no bruit  lower extremity edema: none      Musculoskeletal:  no clubbing of fingers.   normocephalic, head atraumatic  Skin:   warm, dry  Psychiatric:  judgement and insight appropriate  normal mood and affect    RESULTS:   Procedures          Labs:  Lab Results   Component Value Date    CHOL 219 (H) 01/18/2023    TRIG 88 01/18/2023    HDL 52 01/18/2023     (H) 01/18/2023    AST 16 01/18/2023    ALT 16 01/18/2023     Lab Results   Component Value Date    HGBA1C 5.30 01/18/2023     No components found for: \"CREATINININE\"  No results found for: \"EGFRIFNONA\"    Most recent PCP note, imaging tests, and labs reviewed.    ASSESSMENT:  Problem List Items Addressed This Visit       PAIGE (dyspnea on exertion) - Primary    Relevant Orders    Adult Transthoracic Echo Complete w/ Color, Spectral and Contrast if necessary per protocol    CT Angiogram Coronary-Cardiology Interpretation    Troponin I    Precordial chest pain    Relevant Orders    Adult Transthoracic Echo Complete w/ Color, Spectral and Contrast if necessary per protocol    CT Angiogram Coronary-Cardiology Interpretation    Troponin I    Hypertension, essential    Relevant Medications    metoprolol tartrate (LOPRESSOR) 50 MG tablet    Other Relevant Orders    Troponin I    Mixed hyperlipidemia    Relevant Medications    rosuvastatin (CRESTOR) 5 MG tablet    Other " Relevant Orders    Troponin I       PLAN:    1.  Shortness of breath with chest pain:  Patient requesting troponin to be drawn will order  Check coronary CTA  Check echocardiogram  Advised patient to call 911 if any further symptoms occur    2.  Hyperlipidemia:  Xanthelasma present on eyelids on exam  ' Recent lipids from 1/2024 reviewed total cholesterol 240,  HDL 50  Start rosuvastatin 5 mg daily she is quite concerned about potential side effects    3.  Hypertension:  Goal blood pressure less than 130/80  Continue current medical therapy well-controlled currently.    4.  Sinus tachycardia:  May need beta-blocker in the future      Return to clinic in 6 months, or sooner as needed.    Thank you for the opportunity to share in the care of your patient; please do not hesitate to call me with any questions.     Rony Goode MD, [facility], [facility]  Office: [phone] [address] [place] [address]    03/07/24

## 2024-03-31 ENCOUNTER — PATIENT MESSAGE (OUTPATIENT)
Dept: FAMILY MEDICINE CLINIC | Facility: CLINIC | Age: 49
End: 2024-03-31
Payer: COMMERCIAL

## 2024-03-31 DIAGNOSIS — E78.00 PURE HYPERCHOLESTEROLEMIA: Primary | ICD-10-CM

## 2024-04-03 RX ORDER — ATORVASTATIN CALCIUM 10 MG/1
10 TABLET, FILM COATED ORAL DAILY
Qty: 30 TABLET | Refills: 11 | Status: SHIPPED | OUTPATIENT
Start: 2024-04-03

## 2024-04-05 ENCOUNTER — OFFICE VISIT (OUTPATIENT)
Dept: FAMILY MEDICINE CLINIC | Facility: CLINIC | Age: 49
End: 2024-04-05
Payer: COMMERCIAL

## 2024-04-05 VITALS
HEART RATE: 68 BPM | SYSTOLIC BLOOD PRESSURE: 144 MMHG | BODY MASS INDEX: 35.22 KG/M2 | WEIGHT: 224.4 LBS | HEIGHT: 67 IN | OXYGEN SATURATION: 96 % | DIASTOLIC BLOOD PRESSURE: 98 MMHG

## 2024-04-05 DIAGNOSIS — M54.50 CHRONIC LEFT-SIDED LOW BACK PAIN WITHOUT SCIATICA: Primary | ICD-10-CM

## 2024-04-05 DIAGNOSIS — G89.29 CHRONIC LEFT-SIDED LOW BACK PAIN WITHOUT SCIATICA: Primary | ICD-10-CM

## 2024-04-05 DIAGNOSIS — M51.36 DDD (DEGENERATIVE DISC DISEASE), LUMBAR: ICD-10-CM

## 2024-04-05 PROCEDURE — 99214 OFFICE O/P EST MOD 30 MIN: CPT | Performed by: STUDENT IN AN ORGANIZED HEALTH CARE EDUCATION/TRAINING PROGRAM

## 2024-04-05 RX ORDER — METAXALONE 800 MG/1
800 TABLET ORAL 3 TIMES DAILY PRN
Qty: 45 TABLET | Refills: 0 | Status: SHIPPED | OUTPATIENT
Start: 2024-04-05

## 2024-04-05 NOTE — PROGRESS NOTES
Established Office Visit      Patient Name: Jeannette Aviles  : 1975   MRN: [id number]   Care Team: Patient Care Team:  Adelaide Mc DO as PCP - General (Internal Medicine)  Marialuisa Lu MD as Consulting Physician (Endocrinology)  Rony Goode MD as Consulting Physician (Cardiology)  Adelaide Mc DO as Referring Physician (Internal Medicine)    Chief Complaint:    Chief Complaint   Patient presents with   • Back Pain     Lower area        History of Present Illness: Jeannette Aviles is a 49 y.o. female  with anxiety/depression, dermatitis, hypothyroidism, HTN, migraine without aura, chronic low back pain 2/2 lumbar DDD who is here today to follow up with low  back pain.    She has hx of low back pain that flares periodically. She woke up this morning with left sided low back discomfort with radiation into her glute. She took a muscle relaxer (skelaxin 800mg) with some mild improvement. This makes her feel a little tired. Heating pad is usually helpful. Typically her flares last 1-2 weeks. 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     Subjective      Review of Systems:   Review of Systems - See HPI    I have reviewed and the following portions of the patient's history were updated as appropriate: past family history, past medical history, past social history, past surgical history and problem list.    Medications:     Current Outpatient Medications:   •  atorvastatin (LIPITOR) 10 MG tablet, Take 1 tablet by mouth Daily., Disp: 30 tablet, Rfl: 11  •  DULoxetine (CYMBALTA) 60 MG capsule, Take 1 capsule by mouth Daily., Disp: 90 capsule, Rfl: 1  •  famotidine (PEPCID) 40 MG tablet, Take 1 tablet by mouth 2 (Two) Times a Day., Disp: 180 tablet, Rfl: 1  •  fexofenadine (ALLEGRA) 180 MG tablet, Take 1 tablet by mouth 2 (Two) Times a Day., Disp: 90 tablet, Rfl: 1  •  hydroCHLOROthiazide 12.5 MG tablet, Take 1 tablet by mouth  "Daily., Disp: 30 tablet, Rfl: 2  •  levothyroxine (SYNTHROID, LEVOTHROID) 125 MCG tablet, Take 1 tablet by mouth Daily., Disp: 90 tablet, Rfl: 1  •  losartan (COZAAR) 100 MG tablet, TAKE ONE TABLET BY MOUTH DAILY, Disp: 30 tablet, Rfl: 5  •  meloxicam (MOBIC) 7.5 MG tablet, TAKE ONE TO TWO TABLETS BY MOUTH EVERY DAY FOR PAIN, Disp: 60 tablet, Rfl: 2  •  metoprolol tartrate (LOPRESSOR) 50 MG tablet, Take 1 tablet the night before CT angiography.  One hour before CT scan take 1 tablet if heart rate <75 bpm and 2 tablets if >75 bpm, Disp: 4 tablet, Rfl: 0  •  vitamin B-12 (CYANOCOBALAMIN) 1000 MCG tablet, Take 1 tablet by mouth Daily., Disp: 90 tablet, Rfl: 3  •  metaxalone (Skelaxin) 800 MG tablet, Take 1 tablet by mouth 3 (Three) Times a Day As Needed for Muscle Spasms., Disp: 45 tablet, Rfl: 0    Allergies:   Allergies   Allergen Reactions   • Aspartame Unknown - High Severity   • Buspirone Dizziness   • Corticosteroids Hives and Swelling   • Latex Hives       Objective     Physical Exam:  Vital Signs:   Vitals:    04/05/24 1122   BP: 144/98   Pulse: 68   SpO2: 96%   Weight: 102 kg (224 lb 6.4 oz)   Height: 170.2 cm (67\")   PainSc:   6     Body mass index is 35.15 kg/m².     Physical Exam  Vitals reviewed.   Constitutional:       Appearance: Normal appearance. She is not ill-appearing.   Cardiovascular:      Rate and Rhythm: Normal rate.   Pulmonary:      Effort: Pulmonary effort is normal. No respiratory distress.   Musculoskeletal:      Comments: Left sided lumbar musculature is particularly tense   ROM intact. TTP over SI joint    Skin:     General: Skin is warm and dry.   Neurological:      Mental Status: She is alert.   Psychiatric:         Mood and Affect: Mood normal.         Behavior: Behavior normal.         Judgment: Judgment normal.         Assessment / Plan      Assessment/Plan:   Problems Addressed This Visit  Diagnoses and all orders for this visit:    1. Chronic left-sided low back pain without " sciatica (Primary)  -     Ambulatory Referral to Physical Therapy Evaluate and treat  -     metaxalone (Skelaxin) 800 MG tablet; Take 1 tablet by mouth 3 (Three) Times a Day As Needed for Muscle Spasms.  Dispense: 45 tablet; Refill: 0    2. DDD (degenerative disc disease), lumbar  -     Ambulatory Referral to Physical Therapy Evaluate and treat  -     metaxalone (Skelaxin) 800 MG tablet; Take 1 tablet by mouth 3 (Three) Times a Day As Needed for Muscle Spasms.  Dispense: 45 tablet; Refill: 0      Acute on chronic flare of left sided low back pain.   Continue meloxicam - can increase to 15mg daily while experiencing flare. Continue Skelaxin 800mg TID prn, heating pad, activity modification. Employer accomodation forms filled out to allow for weight limitations and standing/bending limitations x 2 weeks while she recovers. Referred to PT     Plan of care reviewed with patient at the conclusion of today's visit. Education was provided regarding diagnosis and management.  Patient verbalizes understanding of and agreement with management plan.    Follow Up:   Return in about 6 months (around 10/5/2024) for Recheck chronic medical conditions .    I spent 31 minutes caring for Jeannette on this date of service. This time includes time spent by me in the following activities:preparing for the visit, reviewing tests, obtaining and/or reviewing a separately obtained history, performing a medically appropriate examination and/or evaluation , counseling and educating the patient/family/caregiver, referring and communicating with other health care professionals , documenting information in the medical record, and care coordination, employer paperwork    DO RADHA Mackay RD  [facility] PRIMARY [facility]ARE  [address]  [facility] [address]  Fax [phone]  Phone [phone]

## 2024-04-13 ENCOUNTER — PATIENT MESSAGE (OUTPATIENT)
Dept: FAMILY MEDICINE CLINIC | Facility: CLINIC | Age: 49
End: 2024-04-13
Payer: COMMERCIAL

## 2024-04-13 DIAGNOSIS — M54.50 CHRONIC LEFT-SIDED LOW BACK PAIN WITHOUT SCIATICA: ICD-10-CM

## 2024-04-13 DIAGNOSIS — E03.8 HYPOTHYROIDISM DUE TO HASHIMOTO'S THYROIDITIS: Primary | ICD-10-CM

## 2024-04-13 DIAGNOSIS — M51.36 DDD (DEGENERATIVE DISC DISEASE), LUMBAR: ICD-10-CM

## 2024-04-13 DIAGNOSIS — F41.9 ANXIETY AND DEPRESSION: ICD-10-CM

## 2024-04-13 DIAGNOSIS — L29.9 ITCHY SKIN: ICD-10-CM

## 2024-04-13 DIAGNOSIS — G56.01 CARPAL TUNNEL SYNDROME OF RIGHT WRIST: ICD-10-CM

## 2024-04-13 DIAGNOSIS — G89.29 CHRONIC LEFT-SIDED LOW BACK PAIN WITHOUT SCIATICA: ICD-10-CM

## 2024-04-13 DIAGNOSIS — E06.3 HYPOTHYROIDISM DUE TO HASHIMOTO'S THYROIDITIS: Primary | ICD-10-CM

## 2024-04-13 DIAGNOSIS — I10 PRIMARY HYPERTENSION: ICD-10-CM

## 2024-04-13 DIAGNOSIS — F32.A ANXIETY AND DEPRESSION: ICD-10-CM

## 2024-04-13 DIAGNOSIS — E53.8 B12 DEFICIENCY: ICD-10-CM

## 2024-04-13 DIAGNOSIS — E78.00 PURE HYPERCHOLESTEROLEMIA: ICD-10-CM

## 2024-04-17 RX ORDER — MELOXICAM 7.5 MG/1
7.5-15 TABLET ORAL DAILY PRN
Qty: 120 TABLET | Refills: 2 | Status: SHIPPED | OUTPATIENT
Start: 2024-04-17

## 2024-04-17 RX ORDER — LEVOTHYROXINE SODIUM 0.12 MG/1
125 TABLET ORAL DAILY
Qty: 90 TABLET | Refills: 3 | Status: SHIPPED | OUTPATIENT
Start: 2024-04-17

## 2024-04-17 RX ORDER — FAMOTIDINE 40 MG/1
40 TABLET, FILM COATED ORAL 2 TIMES DAILY
Qty: 180 TABLET | Refills: 3 | Status: SHIPPED | OUTPATIENT
Start: 2024-04-17

## 2024-04-17 RX ORDER — FEXOFENADINE HCL 180 MG/1
180 TABLET ORAL 2 TIMES DAILY
Qty: 90 TABLET | Refills: 1 | Status: SHIPPED | OUTPATIENT
Start: 2024-04-17

## 2024-04-17 RX ORDER — HYDROCHLOROTHIAZIDE 12.5 MG/1
12.5 TABLET ORAL DAILY
Qty: 90 TABLET | Refills: 3 | Status: SHIPPED | OUTPATIENT
Start: 2024-04-17

## 2024-04-17 RX ORDER — ATORVASTATIN CALCIUM 10 MG/1
10 TABLET, FILM COATED ORAL DAILY
Qty: 90 TABLET | Refills: 3 | Status: SHIPPED | OUTPATIENT
Start: 2024-04-17

## 2024-04-17 RX ORDER — LANOLIN ALCOHOL/MO/W.PET/CERES
1000 CREAM (GRAM) TOPICAL DAILY
Qty: 90 TABLET | Refills: 3 | Status: SHIPPED | OUTPATIENT
Start: 2024-04-17

## 2024-04-17 RX ORDER — LOSARTAN POTASSIUM 100 MG/1
100 TABLET ORAL DAILY
Qty: 90 TABLET | Refills: 3 | Status: SHIPPED | OUTPATIENT
Start: 2024-04-17

## 2024-04-17 RX ORDER — DULOXETIN HYDROCHLORIDE 60 MG/1
60 CAPSULE, DELAYED RELEASE ORAL DAILY
Qty: 90 CAPSULE | Refills: 3 | Status: SHIPPED | OUTPATIENT
Start: 2024-04-17

## 2024-04-17 RX ORDER — BUSPIRONE HYDROCHLORIDE 10 MG/1
10 TABLET ORAL 2 TIMES DAILY PRN
Qty: 90 TABLET | Refills: 3 | Status: SHIPPED | OUTPATIENT
Start: 2024-04-17

## 2024-04-19 ENCOUNTER — HOSPITAL ENCOUNTER (OUTPATIENT)
Dept: CARDIOLOGY | Facility: HOSPITAL | Age: 49
Discharge: HOME | End: 2024-04-19
Payer: COMMERCIAL

## 2024-04-19 VITALS — BODY MASS INDEX: 35.16 KG/M2 | WEIGHT: 224 LBS | HEIGHT: 67 IN

## 2024-04-19 DIAGNOSIS — R06.09 DOE (DYSPNEA ON EXERTION): ICD-10-CM

## 2024-04-19 DIAGNOSIS — R07.2 PRECORDIAL CHEST PAIN: ICD-10-CM

## 2024-04-19 PROCEDURE — 93306 TTE W/DOPPLER COMPLETE: CPT

## 2024-04-22 LAB
AORTIC DIMENSIONLESS INDEX: 0.5 (DI)
BH CV ECHO MEAS - AI P1/2T: 732.1 MSEC
BH CV ECHO MEAS - AO MAX PG: 19.9 MMHG
BH CV ECHO MEAS - AO MEAN PG: 9.8 MMHG
BH CV ECHO MEAS - AO ROOT DIAM: 2.9 CM
BH CV ECHO MEAS - AO V2 MAX: 222.6 CM/SEC
BH CV ECHO MEAS - AO V2 VTI: 46.3 CM
BH CV ECHO MEAS - AVA(I,D): 1.68 CM2
BH CV ECHO MEAS - EDV(CUBED): 100.5 ML
BH CV ECHO MEAS - EDV(MOD-SP2): 153 ML
BH CV ECHO MEAS - EDV(MOD-SP4): 132 ML
BH CV ECHO MEAS - EF(MOD-BP): 66 %
BH CV ECHO MEAS - EF(MOD-SP2): 71.9 %
BH CV ECHO MEAS - EF(MOD-SP4): 60.6 %
BH CV ECHO MEAS - ESV(CUBED): 40.5 ML
BH CV ECHO MEAS - ESV(MOD-SP2): 43 ML
BH CV ECHO MEAS - ESV(MOD-SP4): 52 ML
BH CV ECHO MEAS - FS: 26.1 %
BH CV ECHO MEAS - IVS/LVPW: 0.96 CM
BH CV ECHO MEAS - IVSD: 0.87 CM
BH CV ECHO MEAS - LA DIMENSION: 3.9 CM
BH CV ECHO MEAS - LAT PEAK E' VEL: 12.4 CM/SEC
BH CV ECHO MEAS - LV DIASTOLIC VOL/BSA (35-75): 62.2 CM2
BH CV ECHO MEAS - LV MASS(C)D: 138 GRAMS
BH CV ECHO MEAS - LV MAX PG: 4.5 MMHG
BH CV ECHO MEAS - LV MEAN PG: 2.33 MMHG
BH CV ECHO MEAS - LV SYSTOLIC VOL/BSA (12-30): 24.5 CM2
BH CV ECHO MEAS - LV V1 MAX: 104.9 CM/SEC
BH CV ECHO MEAS - LV V1 VTI: 22.5 CM
BH CV ECHO MEAS - LVIDD: 4.7 CM
BH CV ECHO MEAS - LVIDS: 3.4 CM
BH CV ECHO MEAS - LVOT AREA: 3.5 CM2
BH CV ECHO MEAS - LVOT DIAM: 2.1 CM
BH CV ECHO MEAS - LVPWD: 0.91 CM
BH CV ECHO MEAS - MED PEAK E' VEL: 8.15 CM/SEC
BH CV ECHO MEAS - MR MAX PG: 107.7 MMHG
BH CV ECHO MEAS - MR MAX VEL: 519 CM/SEC
BH CV ECHO MEAS - MR MEAN PG: 68 MMHG
BH CV ECHO MEAS - MR MEAN VEL: 387 CM/SEC
BH CV ECHO MEAS - MR VTI: 185 CM
BH CV ECHO MEAS - MV A MAX VEL: 44.9 CM/SEC
BH CV ECHO MEAS - MV DEC SLOPE: 255 CM/SEC2
BH CV ECHO MEAS - MV DEC TIME: 0.29 SEC
BH CV ECHO MEAS - MV E MAX VEL: 84.4 CM/SEC
BH CV ECHO MEAS - MV E/A: 1.88
BH CV ECHO MEAS - MV P1/2T: 122.9 MSEC
BH CV ECHO MEAS - MVA(P1/2T): 1.79 CM2
BH CV ECHO MEAS - PA ACC SLOPE: 836 CM/SEC2
BH CV ECHO MEAS - PA ACC TIME: 0.1 SEC
BH CV ECHO MEAS - PA V2 MAX: 105 CM/SEC
BH CV ECHO MEAS - PAPD(PI EDV): 2 MMHG
BH CV ECHO MEAS - PI END-D VEL: 65.2 CM/SEC
BH CV ECHO MEAS - RAP SYSTOLE: 8 MMHG
BH CV ECHO MEAS - RVSP: 28 MMHG
BH CV ECHO MEAS - SV(LVOT): 77.9 ML
BH CV ECHO MEAS - SV(MOD-SP2): 110 ML
BH CV ECHO MEAS - SV(MOD-SP4): 80 ML
BH CV ECHO MEAS - SVI(MOD-SP2): 51.8 ML/M2
BH CV ECHO MEAS - SVI(MOD-SP4): 37.7 ML/M2
BH CV ECHO MEAS - TAPSE (>1.6): 2.2 CM
BH CV ECHO MEAS - TR MAX PG: 19.9 MMHG
BH CV ECHO MEAS - TR MAX VEL: 223 CM/SEC
BH CV ECHO MEASUREMENTS AVERAGE E/E' RATIO: 8.21
BH CV VAS BP LEFT ARM: NORMAL MMHG
BH CV XLRA - RV BASE: 3.9 CM
BH CV XLRA - RV LENGTH: 7.8 CM
BH CV XLRA - RV MID: 3.3 CM
BH CV XLRA - TDI S': 15 CM/SEC
IVRT: 90 MS
LEFT ATRIUM VOLUME INDEX: 29.7 ML/M2
LV EF 2D ECHO EST: 65 %

## 2024-04-24 ENCOUNTER — TELEMEDICINE (OUTPATIENT)
Dept: FAMILY MEDICINE CLINIC | Facility: CLINIC | Age: 49
End: 2024-04-24
Payer: COMMERCIAL

## 2024-04-24 DIAGNOSIS — R07.2 PRECORDIAL CHEST PAIN: ICD-10-CM

## 2024-04-24 DIAGNOSIS — R06.09 DOE (DYSPNEA ON EXERTION): Primary | ICD-10-CM

## 2024-04-24 DIAGNOSIS — G89.29 CHRONIC LEFT-SIDED LOW BACK PAIN WITHOUT SCIATICA: Primary | ICD-10-CM

## 2024-04-24 DIAGNOSIS — M54.50 CHRONIC LEFT-SIDED LOW BACK PAIN WITHOUT SCIATICA: Primary | ICD-10-CM

## 2024-04-24 DIAGNOSIS — M51.36 DDD (DEGENERATIVE DISC DISEASE), LUMBAR: ICD-10-CM

## 2024-04-24 PROCEDURE — 99214 OFFICE O/P EST MOD 30 MIN: CPT | Performed by: STUDENT IN AN ORGANIZED HEALTH CARE EDUCATION/TRAINING PROGRAM

## 2024-04-24 RX ORDER — SODIUM CHLORIDE 9 MG/ML
40 INJECTION, SOLUTION INTRAVENOUS AS NEEDED
OUTPATIENT
Start: 2024-04-24

## 2024-04-24 RX ORDER — SODIUM CHLORIDE 0.9 % (FLUSH) 0.9 %
10 SYRINGE (ML) INJECTION EVERY 12 HOURS SCHEDULED
OUTPATIENT
Start: 2024-04-24

## 2024-04-24 RX ORDER — SODIUM CHLORIDE 0.9 % (FLUSH) 0.9 %
10 SYRINGE (ML) INJECTION AS NEEDED
OUTPATIENT
Start: 2024-04-24

## 2024-04-24 RX ORDER — NITROGLYCERIN 0.4 MG/1
0.8 TABLET SUBLINGUAL
OUTPATIENT
Start: 2024-04-24

## 2024-04-24 RX ORDER — METOPROLOL TARTRATE 1 MG/ML
5 INJECTION, SOLUTION INTRAVENOUS
OUTPATIENT
Start: 2024-04-24

## 2024-04-24 RX ORDER — NITROGLYCERIN 0.4 MG/1
0.4 TABLET SUBLINGUAL
OUTPATIENT
Start: 2024-04-24 | End: 2024-04-24

## 2024-04-24 RX ORDER — METOPROLOL TARTRATE 50 MG/1
50 TABLET, FILM COATED ORAL
OUTPATIENT
Start: 2024-04-24

## 2024-04-24 RX ORDER — LIDOCAINE HYDROCHLORIDE 10 MG/ML
0.5 INJECTION, SOLUTION EPIDURAL; INFILTRATION; INTRACAUDAL; PERINEURAL ONCE AS NEEDED
OUTPATIENT
Start: 2024-04-24

## 2024-04-24 RX ORDER — METOPROLOL TARTRATE 50 MG/1
TABLET, FILM COATED ORAL
Qty: 3 TABLET | Refills: 0 | Status: SHIPPED | OUTPATIENT
Start: 2024-04-24

## 2024-04-24 NOTE — PROGRESS NOTES
"Chief Complaint  No chief complaint on file.    Subjective           Jeannette Aviles presents to [facility]  History of Present Illness  Jeannette Aviles is a 49 y.o. female  with anxiety/depression, dermatitis, hypothyroidism, HTN, migraine without aura, chronic low back pain 2/2 lumbar DDD who is here today via telehealth to follow up with low back pain.     She was seen about 2-3 weeks ago for exacerbation of low back pain.   Employer accomodation forms filled out to allow for weight limitations and standing/bending limitations x 2 weeks while she recovers.     Her work accommodations were denied and she was asked instead to take a continuous medical leave of absence 4/11/2024-4/21/2024. She has used this opportunity to make several PT appointments and has been able to complete 7 visits thus far. She is also taking mobic and muscle relaxer prn.     She has noticed improvement in her symptoms with PT NSAID, muscle relaxer but unfortunately she lifted a large laundry basket and felt pain return. She is requesting extension until 5/4/24 with plans to return to work on 5/5/24. During this time she is planning to continue PT.    Objective   Vital Signs:   There were no vitals taken for this visit.    Estimated body mass index is 35.08 kg/m² as calculated from the following:    Height as of 4/19/24: 170.2 cm (67\").    Weight as of 4/19/24: 102 kg (224 lb).     Physical Exam   Constitutional: She appears well-developed and well-nourished.   Pulmonary/Chest: Effort normal.   Psychiatric: She has a normal mood and affect.     Result Review :                   Assessment and Plan      Diagnoses and all orders for this visit:    1. Chronic left-sided low back pain without sciatica (Primary)    2. DDD (degenerative disc disease), lumbar      Acute on chronic flare of left sided low back pain.   Partial improvement with PT, Skelaxin, Mobic, heating pad, activity modification  She is " working for [employer] and unable to continue without exacerbating pain at this time. Will benefit from additional time off to recover and participate in PT. Unfortunately employer does not have position open to allow for appropriate light duty (weight restrictions, stnading/bending limitations). Thus it is in her best interest to take an extension on medical leave of absence. Employer forms filled out today. Anticipate return to work date 5/5/24.     I spent 31 minutes caring for Jeannette on this date of service. This time includes time spent by me in the following activities:preparing for the visit, reviewing tests, counseling and educating the patient/family/caregiver, and employer paperwork  Follow Up     No follow-ups on file.  Patient was given instructions and counseling regarding her condition or for health maintenance advice. Please see specific information pulled into the AVS if appropriate.     Mode of Visit: Video  Location of patient: home  Location of provider: [facility]  You have chosen to receive care through a telehealth visit.  The patient has signed the video visit consent form.  The visit included audio and video interaction. No technical issues occurred during this visit.

## 2024-05-01 ENCOUNTER — PATIENT MESSAGE (OUTPATIENT)
Dept: FAMILY MEDICINE CLINIC | Facility: CLINIC | Age: 49
End: 2024-05-01
Payer: COMMERCIAL

## 2024-06-14 DIAGNOSIS — L29.9 ITCHY SKIN: ICD-10-CM

## 2024-06-14 RX ORDER — FEXOFENADINE HCL 180 MG/1
180 TABLET ORAL 2 TIMES DAILY
Qty: 180 TABLET | Refills: 0 | Status: SHIPPED | OUTPATIENT
Start: 2024-06-14

## 2024-06-20 ENCOUNTER — HOSPITAL ENCOUNTER (OUTPATIENT)
Dept: MAMMOGRAPHY | Facility: HOSPITAL | Age: 49
Discharge: HOME | End: 2024-06-20
Admitting: STUDENT IN AN ORGANIZED HEALTH CARE EDUCATION/TRAINING PROGRAM
Payer: COMMERCIAL

## 2024-06-20 DIAGNOSIS — Z12.31 SCREENING MAMMOGRAM FOR BREAST CANCER: ICD-10-CM

## 2024-06-20 PROCEDURE — 77067 SCR MAMMO BI INCL CAD: CPT

## 2024-06-20 PROCEDURE — 77063 BREAST TOMOSYNTHESIS BI: CPT

## 2024-06-24 ENCOUNTER — TELEPHONE (OUTPATIENT)
Dept: FAMILY MEDICINE CLINIC | Facility: CLINIC | Age: 49
End: 2024-06-24
Payer: COMMERCIAL

## 2024-06-24 ENCOUNTER — TELEPHONE (OUTPATIENT)
Dept: INFUSION THERAPY | Facility: HOSPITAL | Age: 49
End: 2024-06-24
Payer: COMMERCIAL

## 2024-06-24 NOTE — TELEPHONE ENCOUNTER
Attempted to contact patient as pre-procedure phone call prior to planned CT angiogram coronary planned for 6/26/24.  Left voicemail message reminder with arrival time, nothing to eat or drink 4 hours prior to arrival time, no caffeine after midnight, okay to take medications as per normal routine on Wednesday,  is recommended, and if have any questions may contact outpatient prep and recovery at [phone].

## 2024-06-24 NOTE — TELEPHONE ENCOUNTER
Pharmacy Name:     [facility]    Pharmacy representative name:     MARIOLA    Pharmacy representative phone number:     [phone] (Work)     What medication are you calling in regards to:       fexofenadine (ALLEGRA) 180 MG tablet     What question does the pharmacy have:     PHARMACY REQUESTED CONTACT FROM DR BARBER REGARDING THE DOSAGE/FREQUENCY FOR MEDICATION INCLUDED ABOVE    PHARMACY STATED THE DOSAGE/FREQUENCY  MG TWICE DAILY IS HIGH    PHARMACY REQUESTED CONFIRMATION IF DR BARBER WOULD PREFER THE DOSAGE/FREQUENCY TO   MG ONCE DAILY INSTEAD

## 2024-06-25 ENCOUNTER — TELEPHONE (OUTPATIENT)
Dept: INFUSION THERAPY | Facility: HOSPITAL | Age: 49
End: 2024-06-25
Payer: COMMERCIAL

## 2024-06-25 ENCOUNTER — TELEPHONE (OUTPATIENT)
Dept: CARDIOLOGY | Facility: CLINIC | Age: 49
End: 2024-06-25
Payer: COMMERCIAL

## 2024-06-25 ENCOUNTER — PATIENT MESSAGE (OUTPATIENT)
Dept: FAMILY MEDICINE CLINIC | Facility: CLINIC | Age: 49
End: 2024-06-25
Payer: COMMERCIAL

## 2024-06-25 RX ORDER — METOPROLOL TARTRATE 50 MG/1
TABLET, FILM COATED ORAL
Qty: 3 TABLET | Refills: 0 | Status: SHIPPED | OUTPATIENT
Start: 2024-06-25 | End: 2024-06-28

## 2024-06-25 NOTE — TELEPHONE ENCOUNTER
Ms. Aviles contacted outpatient prep and recovery with questions regarding her upcoming CTA coronary scheduled for 6/26/24. Ms. Aviles reports that her prescription for her Metoprolol was sent to mail order pharmacy and she isn't able to get it filled before tomorrow. Encouraged her to reach out to Dr. Goode's office to have prescription called to a local pharmacy. Reviewed procedure instructions, meds, allergies, health history, and arrival time.

## 2024-06-26 ENCOUNTER — HOSPITAL ENCOUNTER (OUTPATIENT)
Dept: CT IMAGING | Facility: HOSPITAL | Age: 49
Discharge: HOME | End: 2024-06-26
Payer: COMMERCIAL

## 2024-06-26 VITALS
HEIGHT: 67 IN | OXYGEN SATURATION: 97 % | BODY MASS INDEX: 35.25 KG/M2 | RESPIRATION RATE: 16 BRPM | WEIGHT: 224.6 LBS | TEMPERATURE: 96.5 F | DIASTOLIC BLOOD PRESSURE: 57 MMHG | HEART RATE: 55 BPM | SYSTOLIC BLOOD PRESSURE: 105 MMHG

## 2024-06-26 DIAGNOSIS — R07.2 PRECORDIAL CHEST PAIN: ICD-10-CM

## 2024-06-26 DIAGNOSIS — R06.09 DOE (DYSPNEA ON EXERTION): ICD-10-CM

## 2024-06-26 LAB — B-HCG UR QL: NEGATIVE

## 2024-06-26 PROCEDURE — 81025 URINE PREGNANCY TEST: CPT | Performed by: RADIOLOGY

## 2024-06-26 PROCEDURE — 25510000001 IOPAMIDOL PER 1 ML: Performed by: INTERNAL MEDICINE

## 2024-06-26 PROCEDURE — 75574 CT ANGIO HRT W/3D IMAGE: CPT

## 2024-06-26 RX ORDER — NITROGLYCERIN 0.4 MG/1
0.4 TABLET SUBLINGUAL
Status: DISCONTINUED | OUTPATIENT
Start: 2024-06-26 | End: 2024-06-27 | Stop reason: HOSPADM

## 2024-06-26 RX ORDER — METOPROLOL TARTRATE 100 MG/1
100 TABLET ORAL ONCE
Status: DISCONTINUED | OUTPATIENT
Start: 2024-06-26 | End: 2024-06-27 | Stop reason: HOSPADM

## 2024-06-26 RX ORDER — SODIUM CHLORIDE 9 MG/ML
40 INJECTION, SOLUTION INTRAVENOUS AS NEEDED
Status: DISCONTINUED | OUTPATIENT
Start: 2024-06-26 | End: 2024-06-27 | Stop reason: HOSPADM

## 2024-06-26 RX ORDER — LIDOCAINE HYDROCHLORIDE 10 MG/ML
0.5 INJECTION, SOLUTION EPIDURAL; INFILTRATION; INTRACAUDAL; PERINEURAL ONCE AS NEEDED
Status: DISCONTINUED | OUTPATIENT
Start: 2024-06-26 | End: 2024-06-27 | Stop reason: HOSPADM

## 2024-06-26 RX ORDER — NITROGLYCERIN 0.4 MG/1
0.8 TABLET SUBLINGUAL
Status: COMPLETED | OUTPATIENT
Start: 2024-06-26 | End: 2024-06-26

## 2024-06-26 RX ORDER — SODIUM CHLORIDE 0.9 % (FLUSH) 0.9 %
10 SYRINGE (ML) INJECTION AS NEEDED
Status: DISCONTINUED | OUTPATIENT
Start: 2024-06-26 | End: 2024-06-27 | Stop reason: HOSPADM

## 2024-06-26 RX ORDER — METOPROLOL TARTRATE 100 MG/1
200 TABLET ORAL ONCE
Status: DISCONTINUED | OUTPATIENT
Start: 2024-06-26 | End: 2024-06-27 | Stop reason: HOSPADM

## 2024-06-26 RX ORDER — NITROGLYCERIN 0.4 MG/1
0.4 TABLET SUBLINGUAL
Status: COMPLETED | OUTPATIENT
Start: 2024-06-26 | End: 2024-06-26

## 2024-06-26 RX ORDER — METOPROLOL TARTRATE 50 MG/1
50 TABLET, FILM COATED ORAL
Status: DISCONTINUED | OUTPATIENT
Start: 2024-06-26 | End: 2024-06-27 | Stop reason: HOSPADM

## 2024-06-26 RX ORDER — METOPROLOL TARTRATE 50 MG/1
50 TABLET, FILM COATED ORAL ONCE
Status: DISCONTINUED | OUTPATIENT
Start: 2024-06-26 | End: 2024-06-27 | Stop reason: HOSPADM

## 2024-06-26 RX ORDER — NITROGLYCERIN 0.4 MG/1
TABLET SUBLINGUAL
Status: COMPLETED
Start: 2024-06-26 | End: 2024-06-26

## 2024-06-26 RX ORDER — SODIUM CHLORIDE 0.9 % (FLUSH) 0.9 %
10 SYRINGE (ML) INJECTION EVERY 12 HOURS SCHEDULED
Status: DISCONTINUED | OUTPATIENT
Start: 2024-06-26 | End: 2024-06-27 | Stop reason: HOSPADM

## 2024-06-26 RX ORDER — NITROGLYCERIN 0.4 MG/1
0.8 TABLET SUBLINGUAL
Status: DISCONTINUED | OUTPATIENT
Start: 2024-06-26 | End: 2024-06-27 | Stop reason: HOSPADM

## 2024-06-26 RX ORDER — METOPROLOL TARTRATE 1 MG/ML
5 INJECTION, SOLUTION INTRAVENOUS
Status: DISCONTINUED | OUTPATIENT
Start: 2024-06-26 | End: 2024-06-27 | Stop reason: HOSPADM

## 2024-06-26 RX ADMIN — NITROGLYCERIN 0.8 MG: 0.4 TABLET SUBLINGUAL at 15:28

## 2024-06-26 RX ADMIN — IOPAMIDOL 65 ML: 755 INJECTION, SOLUTION INTRAVENOUS at 15:49

## 2024-06-28 ENCOUNTER — OFFICE VISIT (OUTPATIENT)
Dept: FAMILY MEDICINE CLINIC | Facility: CLINIC | Age: 49
End: 2024-06-28
Payer: COMMERCIAL

## 2024-06-28 VITALS
DIASTOLIC BLOOD PRESSURE: 86 MMHG | BODY MASS INDEX: 35.72 KG/M2 | HEART RATE: 71 BPM | OXYGEN SATURATION: 96 % | HEIGHT: 67 IN | WEIGHT: 227.6 LBS | SYSTOLIC BLOOD PRESSURE: 120 MMHG

## 2024-06-28 DIAGNOSIS — Z56.6 STRESSFUL WORKPLACE: ICD-10-CM

## 2024-06-28 DIAGNOSIS — R68.89 HEAT INTOLERANCE: ICD-10-CM

## 2024-06-28 DIAGNOSIS — E03.9 HYPOTHYROIDISM, UNSPECIFIED TYPE: ICD-10-CM

## 2024-06-28 DIAGNOSIS — F41.9 ANXIETY: Primary | ICD-10-CM

## 2024-06-28 PROCEDURE — 99213 OFFICE O/P EST LOW 20 MIN: CPT | Performed by: STUDENT IN AN ORGANIZED HEALTH CARE EDUCATION/TRAINING PROGRAM

## 2024-06-28 SDOH — HEALTH STABILITY - MENTAL HEALTH: OTHER PHYSICAL AND MENTAL STRAIN RELATED TO WORK: Z56.6

## 2024-06-28 NOTE — PROGRESS NOTES
Established Office Visit      Patient Name: Jeannette Aviles  : 1975   MRN: [id number]   Care Team: Patient Care Team:  Adelaide Mc DO as PCP - General (Internal Medicine)  Marialuisa Lu MD as Consulting Physician (Endocrinology)  Rony Goode MD as Consulting Physician (Cardiology)  Adelaide Mc DO as Referring Physician (Internal Medicine)    Chief Complaint:    Chief Complaint   Patient presents with    degenerative disc disease     Paperwork        History of Present Illness: Jeannette Aviles is a 49 y.o. female  with anxiety/depression, dermatitis, hypothyroidism, HTN, migraine without aura, chronic low back pain 2/2 lumbar DDD who is here today workplace accommodations.    She reports environmental noise induce significant anxiety and interfere with her ability to concentrate/be productive. The earbuds/headphones offered through her workplace cause discomfort and also do not fit her appropriately. They do not effectively reduce noise. Do not cover ears effectively. She reports environmental sensitivity to ambient noises due to anxiety/mental health concerns. She is requesting use of 2 personal earbuds/headphones to reduce impact of environmental noise and maintain focus and productivity.     She has hx of hypothyroidism which causes temperature intolerance. Underlying anxiety also contributes to her heat sensitivity. She is required to wear full length sleeves and a plastic apron which causes her to become very hot very easily. She is requesting use of personal cooling devices including but not limited to electronic/manual fan and evaporative clothing/accessories (ie cooling towels)    She feels this position is worsening her mental health. She feels she is spread thin trying to care for her family with health concerns and balancing a toxic work place environment.       Subjective      Review of Systems:   Review of Systems - See HPI    I have reviewed and the following  "portions of the patient's history were updated as appropriate: past family history, past medical history, past social history, past surgical history and problem list.    Medications:     Current Outpatient Medications:     busPIRone (BUSPAR) 10 MG tablet, Take 1 tablet by mouth 2 (Two) Times a Day As Needed (anxiety). (Patient taking differently: Take 0.5 tablets by mouth Daily.), Disp: 90 tablet, Rfl: 3    DULoxetine (CYMBALTA) 60 MG capsule, Take 1 capsule by mouth Daily., Disp: 90 capsule, Rfl: 3    famotidine (PEPCID) 40 MG tablet, Take 1 tablet by mouth 2 (Two) Times a Day., Disp: 180 tablet, Rfl: 3    fexofenadine (ALLEGRA) 180 MG tablet, Take 1 tablet by mouth twice daily., Disp: 180 tablet, Rfl: 0    hydroCHLOROthiazide 12.5 MG tablet, Take 1 tablet by mouth Daily., Disp: 90 tablet, Rfl: 3    levothyroxine (SYNTHROID, LEVOTHROID) 125 MCG tablet, Take 1 tablet by mouth Daily., Disp: 90 tablet, Rfl: 3    losartan (COZAAR) 100 MG tablet, Take 1 tablet by mouth Daily., Disp: 90 tablet, Rfl: 3    meloxicam (MOBIC) 7.5 MG tablet, Take 1-2 tablets by mouth Daily As Needed for Moderate Pain or Mild Pain., Disp: 120 tablet, Rfl: 2    vitamin B-12 (CYANOCOBALAMIN) 1000 MCG tablet, Take 1 tablet by mouth Daily., Disp: 90 tablet, Rfl: 3    Allergies:   Allergies   Allergen Reactions    Aspartame Headache     migraines    Atorvastatin Myalgia    Buspirone Dizziness    Corticosteroids Hives and Swelling    Latex Hives       Objective     Physical Exam:  Vital Signs:   Vitals:    06/28/24 0823   BP: 120/86   Pulse: 71   SpO2: 96%   Weight: 103 kg (227 lb 9.6 oz)   Height: 170.2 cm (67.01\")     Body mass index is 35.64 kg/m².     Physical Exam  Vitals reviewed.   Constitutional:       Appearance: Normal appearance.   Cardiovascular:      Rate and Rhythm: Normal rate.   Pulmonary:      Effort: Pulmonary effort is normal. No respiratory distress.   Skin:     General: Skin is warm and dry.   Neurological:      Mental Status: " She is alert.   Psychiatric:         Mood and Affect: Mood normal.         Behavior: Behavior normal.         Judgment: Judgment normal.         Assessment / Plan      Assessment/Plan:   Problems Addressed This Visit  Diagnoses and all orders for this visit:    1. Anxiety (Primary)    2. Heat intolerance    3. Hypothyroidism, unspecified type    4. Stressful workplace    Employer paperwork filled out today to allow for accommodations which would benefit her mental health and allow for her to be more productive/focused at work. Scanned into media.       Plan of care reviewed with patient at the conclusion of today's visit. Education was provided regarding diagnosis and management.  Patient verbalizes understanding of and agreement with management plan.    Follow Up:   No follow-ups on file.    I spent 23 minutes caring for Jeannette on this date of service. This time includes time spent by me in the following activities:preparing for the visit, reviewing tests, obtaining and/or reviewing a separately obtained history, documenting information in the medical record, and employer paperwork    DO RADHA Mackay RD  [facility]  [address]  [facility] [address]  Fax [phone]  Phone [phone]

## 2024-07-01 ENCOUNTER — TELEPHONE (OUTPATIENT)
Dept: CARDIOLOGY | Facility: CLINIC | Age: 49
End: 2024-07-01
Payer: COMMERCIAL

## 2024-07-01 NOTE — TELEPHONE ENCOUNTER
----- Message from Rony Goode sent at 6/28/2024  2:40 PM EDT -----  Please inform the patient of their test results.  Normal coronary CTA. Thank you.

## 2024-07-02 DIAGNOSIS — R00.2 PALPITATIONS: ICD-10-CM

## 2024-07-02 DIAGNOSIS — R00.0 TACHYCARDIA: Primary | ICD-10-CM

## 2024-07-02 DIAGNOSIS — R42 LIGHTHEADEDNESS: ICD-10-CM

## 2024-07-10 ENCOUNTER — OFFICE VISIT (OUTPATIENT)
Dept: FAMILY MEDICINE CLINIC | Facility: CLINIC | Age: 49
End: 2024-07-10
Payer: COMMERCIAL

## 2024-07-10 VITALS
WEIGHT: 222.8 LBS | HEIGHT: 67 IN | DIASTOLIC BLOOD PRESSURE: 90 MMHG | BODY MASS INDEX: 34.97 KG/M2 | HEART RATE: 80 BPM | SYSTOLIC BLOOD PRESSURE: 132 MMHG | OXYGEN SATURATION: 98 %

## 2024-07-10 DIAGNOSIS — F41.9 ANXIETY: Primary | ICD-10-CM

## 2024-07-10 DIAGNOSIS — F33.1 MODERATE EPISODE OF RECURRENT MAJOR DEPRESSIVE DISORDER: ICD-10-CM

## 2024-07-10 PROCEDURE — 99214 OFFICE O/P EST MOD 30 MIN: CPT | Performed by: STUDENT IN AN ORGANIZED HEALTH CARE EDUCATION/TRAINING PROGRAM

## 2024-07-10 NOTE — PROGRESS NOTES
Established Office Visit      Patient Name: Jeannette Aviles  : 1975   MRN: [id number]   Care Team: Patient Care Team:  Adelaide Mc DO as PCP - General (Internal Medicine)  Marialuisa Lu MD as Consulting Physician (Endocrinology)  Rony Goode MD as Consulting Physician (Cardiology)  Adelaide Mc DO as Referring Physician (Internal Medicine)    Chief Complaint:    Chief Complaint   Patient presents with    Anxiety     Paperwork        History of Present Illness: Jeannette Aviles is a 49 y.o. female with anxiety/depression, dermatitis, hypothyroidism, HTN, migraine without aura, chronic low back pain 2/2 lumbar DDD who is here today to follow up with anxiety.    She feels very overwhelmed. Feels work triggers this. She reports feeling exhausted, difficulty sleeping, difficulty staying asleep, emotional outbursts and crying spells. She reports spacing out at work, inability to focus. Feels hopeless like this will never end. Admits to thoughts of self harm at work - no plan but feels sometimes it wouldn't be so bad if she were to be injured at work.     She has reached out to talk therapist near her house and has an appointment on 24.     Subjective      Review of Systems:   Review of Systems - See HPI    I have reviewed and the following portions of the patient's history were updated as appropriate: past family history, past medical history, past social history, past surgical history and problem list.    Medications:     Current Outpatient Medications:     busPIRone (BUSPAR) 10 MG tablet, Take 1 tablet by mouth 2 (Two) Times a Day As Needed (anxiety). (Patient taking differently: Take 0.5 tablets by mouth Daily.), Disp: 90 tablet, Rfl: 3    DULoxetine (CYMBALTA) 60 MG capsule, Take 1 capsule by mouth Daily., Disp: 90 capsule, Rfl: 3    famotidine (PEPCID) 40 MG tablet, Take 1 tablet by mouth 2 (Two) Times a Day., Disp: 180 tablet, Rfl: 3    fexofenadine (ALLEGRA) 180 MG  "tablet, Take 1 tablet by mouth twice daily., Disp: 180 tablet, Rfl: 0    hydroCHLOROthiazide 12.5 MG tablet, Take 1 tablet by mouth Daily., Disp: 90 tablet, Rfl: 3    levothyroxine (SYNTHROID, LEVOTHROID) 125 MCG tablet, Take 1 tablet by mouth Daily., Disp: 90 tablet, Rfl: 3    losartan (COZAAR) 100 MG tablet, Take 1 tablet by mouth Daily., Disp: 90 tablet, Rfl: 3    meloxicam (MOBIC) 7.5 MG tablet, Take 1-2 tablets by mouth Daily As Needed for Moderate Pain or Mild Pain., Disp: 120 tablet, Rfl: 2    vitamin B-12 (CYANOCOBALAMIN) 1000 MCG tablet, Take 1 tablet by mouth Daily., Disp: 90 tablet, Rfl: 3    Allergies:   Allergies   Allergen Reactions    Aspartame Headache     migraines    Atorvastatin Myalgia    Buspirone Dizziness    Corticosteroids Hives and Swelling    Latex Hives       Objective     Physical Exam:  Vital Signs:   Vitals:    07/10/24 0824   BP: 132/90   Pulse: 80   SpO2: 98%   Weight: 101 kg (222 lb 12.8 oz)   Height: 170.2 cm (67.01\")     Body mass index is 34.88 kg/m².     Physical Exam  Vitals reviewed.   Constitutional:       Appearance: Normal appearance.   Cardiovascular:      Rate and Rhythm: Normal rate.   Pulmonary:      Effort: Pulmonary effort is normal. No respiratory distress.   Neurological:      Mental Status: She is alert.   Psychiatric:         Judgment: Judgment normal.      Comments: Anxious, fidgeting         Assessment / Plan      Assessment/Plan:   Problems Addressed This Visit  Diagnoses and all orders for this visit:    1. Anxiety (Primary)  -     Ambulatory Referral to Behavioral Health    2. Moderate episode of recurrent major depressive disorder  -     Ambulatory Referral to Behavioral Health    Uncontrolled - no SI today. Agree with time off work as this has been a large trigger for her anxiety/depression  She is currently taking cymbalta and buspar which is only partially effective.   Has tried wellbutrin, zoloft, prozac in the past which were ineffective or caused side " effects.  Will refer to behavioral health for medication management. 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 Education was provided regarding diagnosis and management.  Patient verbalizes understanding of and agreement with management plan.    Follow Up:   Return in about 8 weeks (around 9/4/2024) for Recheck - telehealth . Has been advised to follow up sooner if needed.     I spent 30 minutes caring for Jeannette on this date of service. This time includes time spent by me in the following activities:preparing for the visit, reviewing tests, obtaining and/or reviewing a separately obtained history, performing a medically appropriate examination and/or evaluation , referring and communicating with other health care professionals , documenting information in the medical record, and employer paperwork    DO RADHA Mackay RD  [facility]  [address]  [facility] [address]  Fax [phone]  Phone [phone]

## 2024-07-24 ENCOUNTER — HOSPITAL ENCOUNTER (OUTPATIENT)
Facility: HOSPITAL | Age: 49
Discharge: HOME | End: 2024-07-24
Payer: COMMERCIAL

## 2024-07-24 DIAGNOSIS — R92.8 ABNORMAL MAMMOGRAM: ICD-10-CM

## 2024-07-24 LAB
NCCN CRITERIA FLAG: ABNORMAL
TYRER CUZICK SCORE: 15.6

## 2024-07-24 PROCEDURE — 76642 ULTRASOUND BREAST LIMITED: CPT

## 2024-07-24 PROCEDURE — 77062 BREAST TOMOSYNTHESIS BI: CPT | Performed by: RADIOLOGY

## 2024-07-24 PROCEDURE — 77066 DX MAMMO INCL CAD BI: CPT

## 2024-07-24 PROCEDURE — G0279 TOMOSYNTHESIS, MAMMO: HCPCS

## 2024-07-24 PROCEDURE — 77066 DX MAMMO INCL CAD BI: CPT | Performed by: RADIOLOGY

## 2024-07-24 PROCEDURE — 76642 ULTRASOUND BREAST LIMITED: CPT | Performed by: RADIOLOGY

## 2024-07-29 NOTE — PROGRESS NOTES
This patient recently took the CARE risk assessment as part of their mammogram appointment. Based on the patient's responses, NCCN criteria for genetic testing was met.     Navigator follow-up:     My attempts to reach the patient to discuss the risk assessment results have been unsuccessful.   Detail Level: Detailed

## 2024-08-01 ENCOUNTER — TELEPHONE (OUTPATIENT)
Dept: CARDIOLOGY | Facility: CLINIC | Age: 49
End: 2024-08-01
Payer: COMMERCIAL

## 2024-08-01 NOTE — TELEPHONE ENCOUNTER
----- Message from Rony Goode sent at 8/1/2024  1:11 PM EDT -----  Please inform the patient of their test results. Thank you.

## 2024-08-13 NOTE — TELEPHONE ENCOUNTER
Caller: Jeannette Aviles     Relationship: SELF    Best call back number: [phone]    What is your medical concern? PATIENT IS SCHEDULED FOR A CT SCAN TOMORROW (06.26.24) SHE HAS A MEDICATION THAT SHE IS SUPPOSE TO TAKE PRIOR- METOPROLOL. THE PRESCRIPTION FOR THAT WAS SENT TO THE [facility]. THEY CAN'T GET IT SHIPPED TO HER IN TIME FOR HER PROCEDURE AND SHE NEEDS THE MEDICATION PRESCRIPTION SENT TO THE [facility] ON [facility] [address] [facility] IN [place].   HUB ATTEMPTING WARM TRANSFER TO OFFICE. PATIENT SAID SHE HAS 2 PILLS LEFT OVER FROM ANOTHER PROCEDURE. PLEASE CALL TO ADVISE.        13-Aug-2024 12:30

## 2024-08-19 ENCOUNTER — PATIENT MESSAGE (OUTPATIENT)
Dept: FAMILY MEDICINE CLINIC | Facility: CLINIC | Age: 49
End: 2024-08-19
Payer: COMMERCIAL

## 2024-09-03 ENCOUNTER — TELEMEDICINE (OUTPATIENT)
Dept: FAMILY MEDICINE CLINIC | Facility: CLINIC | Age: 49
End: 2024-09-03
Payer: COMMERCIAL

## 2024-09-03 VITALS — HEIGHT: 67 IN | WEIGHT: 222 LBS | BODY MASS INDEX: 34.84 KG/M2

## 2024-09-03 DIAGNOSIS — I10 PRIMARY HYPERTENSION: ICD-10-CM

## 2024-09-03 DIAGNOSIS — E78.2 MIXED HYPERLIPIDEMIA: ICD-10-CM

## 2024-09-03 DIAGNOSIS — F41.9 ANXIETY: ICD-10-CM

## 2024-09-03 DIAGNOSIS — E03.9 HYPOTHYROIDISM, UNSPECIFIED TYPE: ICD-10-CM

## 2024-09-03 DIAGNOSIS — F33.1 MODERATE EPISODE OF RECURRENT MAJOR DEPRESSIVE DISORDER: ICD-10-CM

## 2024-09-03 DIAGNOSIS — E53.8 B12 DEFICIENCY: Primary | ICD-10-CM

## 2024-09-03 PROCEDURE — 99214 OFFICE O/P EST MOD 30 MIN: CPT | Performed by: STUDENT IN AN ORGANIZED HEALTH CARE EDUCATION/TRAINING PROGRAM

## 2024-09-03 RX ORDER — FEXOFENADINE HCL 180 MG/1
180 TABLET ORAL DAILY
COMMUNITY

## 2024-09-03 RX ORDER — VITAMIN E 268 MG
CAPSULE ORAL
COMMUNITY
Start: 2024-08-20

## 2024-09-03 NOTE — PROGRESS NOTES
Virtual Video Visit      Date: 2024   Patient Name: Jeannette Aviles  : 1975   MRN: [id number]     Chief Complaint:    Chief Complaint   Patient presents with    Anxiety       I have reviewed the E-Visit questionnaire and the patient's answers, my assessment and plan are listed below.     This provider is located at the home address on file with [facility]. using a secure [employer] Video Visit through [employer]. Patient is being seen remotely via telehealth at their home address in [place], and stated they are in a secure environment for this session. The patient's condition being diagnosed/treated is appropriate for telemedicine. The provider identified herself as well as her credentials. The patient, and/or patients guardian, consent to be seen remotely, and when consent is given they understand that the consent allows for patient identifiable information to be sent to a third party as needed. They may refuse to be seen remotely at any time. The electronic data is encrypted and password protected, and the patient and/or guardian has been advised of the potential risks to privacy not withstanding such measures.    You have chosen to receive care through a virtual video visit. Do you consent to use a video/audio connection for your medical care today? Yes    History of Present Illness: Jeannette Aviles is a 49 y.o. female with anxiety/depression, dermatitis, hypothyroidism, HTN, migraine without aura, chronic low back pain 2/2 lumbar DDD who is seen today to follow up with mental health, employer paperwork     She was last seen about 6 weeks ago and at that time, anxiety/depression was uncontrolled. She was referred to behavioral health for med management.    She started talk therapy with Farhan eBe with [facility]  - she has had one visit thus far but is scheduled to be seen every week and is looking forward to this. She has appointment to establish with behavioral health  "for med management upcoming on 9/16/24.    She stopped taking buspar because she felt it was contributing to her dizziness. She has been stable without this. Overall mental health has somewhat improved. She has noticed some of her creativity and motivation return.     She is planning to return to work October 5 - needs employer form filled out to maintain STD payment      Subjective      Review of Systems:   Review of Systems    I have reviewed and the following portions of the patient's history were updated as appropriate: past family history, past medical history, past social history, past surgical history and problem list.    Medications:     Current Outpatient Medications:     DULoxetine (CYMBALTA) 60 MG capsule, Take 1 capsule by mouth Daily., Disp: 90 capsule, Rfl: 3    famotidine (PEPCID) 40 MG tablet, Take 1 tablet by mouth 2 (Two) Times a Day., Disp: 180 tablet, Rfl: 3    hydroCHLOROthiazide 12.5 MG tablet, Take 1 tablet by mouth Daily., Disp: 90 tablet, Rfl: 3    levothyroxine (SYNTHROID, LEVOTHROID) 125 MCG tablet, Take 1 tablet by mouth Daily., Disp: 90 tablet, Rfl: 3    losartan (COZAAR) 100 MG tablet, Take 1 tablet by mouth Daily., Disp: 90 tablet, Rfl: 3    meloxicam (MOBIC) 7.5 MG tablet, Take 1-2 tablets by mouth Daily As Needed for Moderate Pain or Mild Pain., Disp: 120 tablet, Rfl: 2    Vitamin E 180 MG (400 UNIT) capsule capsule, , Disp: , Rfl:     fexofenadine (ALLEGRA) 180 MG tablet, Take 1 tablet by mouth Daily., Disp: , Rfl:     Allergies:   Allergies   Allergen Reactions    Aspartame Headache     migraines    Atorvastatin Myalgia    Buspirone Dizziness    Corticosteroids Hives and Swelling    Latex Hives       Objective     Physical Exam:  Vital Signs:   Vitals:    09/03/24 1115   Weight: 101 kg (222 lb)   Height: 170.2 cm (67.01\")     Body mass index is 34.76 kg/m².    Physical Exam  Constitutional:       Appearance: Normal appearance.   Neurological:      Mental Status: She is alert. "   Psychiatric:         Mood and Affect: Mood normal.         Behavior: Behavior normal.         Judgment: Judgment normal.           Assessment / Plan      Assessment/Plan:   Diagnoses and all orders for this visit:    1. B12 deficiency (Primary)  -     Vitamin B12; Future  -     Folate; Future    2. Anxiety    3. Moderate episode of recurrent major depressive disorder  -     Vitamin B12; Future    4. Hypothyroidism, unspecified type  -     TSH Rfx On Abnormal To Free T4; Future    5. Primary hypertension  -     CBC No Differential; Future  -     Comprehensive metabolic panel; Future    6. Mixed hyperlipidemia  -     Lipid Panel w/ Chol/HDL Ratio; Future  -     CBC No Differential; Future  -     Comprehensive metabolic panel; Future           Anxiety/depression - Uncontrolled but improving as she has taken leave of absence from work, continues to work with talk therapy, planning to establish with medication management behavioral health provider. Work has been a large trigger for her anxiety/depression   Has tried wellbutrin, zoloft, prozac, buspar in the past which were ineffective or caused side effects.  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 Note that she has tried 2 statins in the past and unable to tolerate due to myalgias.    No longer taking B12 supplement.     Follow Up:   Return for Next scheduled follow up.    Any medications prescribed have been sent electronically to   "[facility] - [place], [place] - [address] [phone]  - [phone] FX  [address] [facility] [address]  [facility] [address]  Phone: [phone] Fax: [phone]      Adelaide Mc DO  [facility]  09/03/24  11:35 EDT

## 2024-09-04 ENCOUNTER — TELEPHONE (OUTPATIENT)
Dept: FAMILY MEDICINE CLINIC | Facility: CLINIC | Age: 49
End: 2024-09-04
Payer: COMMERCIAL

## 2024-09-04 NOTE — TELEPHONE ENCOUNTER
*HUB CAN RELAY*    I called the patient to let her know I had sent her a [employer] message and attached the [employer] paperwork she requested Dr. Mc complete.

## 2024-09-16 ENCOUNTER — OFFICE VISIT (OUTPATIENT)
Age: 49
End: 2024-09-16
Payer: COMMERCIAL

## 2024-09-16 VITALS
WEIGHT: 221 LBS | OXYGEN SATURATION: 96 % | BODY MASS INDEX: 34.69 KG/M2 | DIASTOLIC BLOOD PRESSURE: 86 MMHG | HEART RATE: 96 BPM | SYSTOLIC BLOOD PRESSURE: 126 MMHG | HEIGHT: 67 IN

## 2024-09-16 DIAGNOSIS — F41.1 GENERALIZED ANXIETY DISORDER: ICD-10-CM

## 2024-09-16 DIAGNOSIS — Z51.81 ENCOUNTER FOR THERAPEUTIC DRUG MONITORING: Primary | ICD-10-CM

## 2024-09-16 PROCEDURE — 90792 PSYCH DIAG EVAL W/MED SRVCS: CPT

## 2024-09-16 RX ORDER — DULOXETIN HYDROCHLORIDE 20 MG/1
20 CAPSULE, DELAYED RELEASE ORAL DAILY
Qty: 30 CAPSULE | Refills: 1 | Status: SHIPPED | OUTPATIENT
Start: 2024-09-16 | End: 2024-11-15

## 2024-09-17 ENCOUNTER — TELEPHONE (OUTPATIENT)
Dept: INTERNAL MEDICINE | Facility: CLINIC | Age: 49
End: 2024-09-17
Payer: COMMERCIAL

## 2024-09-17 NOTE — TELEPHONE ENCOUNTER
NEEDS CLARIFICATION ON THE DULOXETINE MEDICATION THAT WAS WRITTEN TODAY.  PLEASE CALL CHAUNCEY BACK [phone]

## 2024-09-30 ENCOUNTER — TELEPHONE (OUTPATIENT)
Dept: INTERNAL MEDICINE | Facility: CLINIC | Age: 49
End: 2024-09-30
Payer: COMMERCIAL

## 2024-09-30 NOTE — TELEPHONE ENCOUNTER
Pt is requesting to cancel the pharmacogenetics test due to cost. The company that performs the test does not bill her insurance so she would have to pay $375 out of pocket.     She is requesting to have the [employer] test because this company will bill her insurance or work out a payment plan. She also would like to  have the cpt code associated with the genesight testing.

## 2024-10-01 ENCOUNTER — LAB (OUTPATIENT)
Dept: LAB | Facility: HOSPITAL | Age: 49
End: 2024-10-01
Payer: COMMERCIAL

## 2024-10-01 DIAGNOSIS — E78.2 MIXED HYPERLIPIDEMIA: ICD-10-CM

## 2024-10-01 DIAGNOSIS — E03.9 HYPOTHYROIDISM, UNSPECIFIED TYPE: ICD-10-CM

## 2024-10-01 DIAGNOSIS — I10 PRIMARY HYPERTENSION: ICD-10-CM

## 2024-10-01 DIAGNOSIS — E53.8 B12 DEFICIENCY: ICD-10-CM

## 2024-10-01 DIAGNOSIS — F33.1 MODERATE EPISODE OF RECURRENT MAJOR DEPRESSIVE DISORDER: ICD-10-CM

## 2024-10-01 LAB
ALBUMIN SERPL-MCNC: 4.4 G/DL (ref 3.5–5.2)
ALBUMIN/GLOB SERPL: 1.3 G/DL
ALP SERPL-CCNC: 88 U/L (ref 39–117)
ALT SERPL W P-5'-P-CCNC: 13 U/L (ref 1–33)
ANION GAP SERPL CALCULATED.3IONS-SCNC: 12 MMOL/L (ref 5–15)
AST SERPL-CCNC: 17 U/L (ref 1–32)
BILIRUB SERPL-MCNC: 0.5 MG/DL (ref 0–1.2)
BUN SERPL-MCNC: 19 MG/DL (ref 6–20)
BUN/CREAT SERPL: 16.7 (ref 7–25)
CALCIUM SPEC-SCNC: 10 MG/DL (ref 8.6–10.5)
CHLORIDE SERPL-SCNC: 99 MMOL/L (ref 98–107)
CHOLEST SERPL-MCNC: 249 MG/DL (ref 0–200)
CO2 SERPL-SCNC: 29 MMOL/L (ref 22–29)
CREAT SERPL-MCNC: 1.14 MG/DL (ref 0.57–1)
DEPRECATED RDW RBC AUTO: 38.2 FL (ref 37–54)
EGFRCR SERPLBLD CKD-EPI 2021: 59.5 ML/MIN/1.73
ERYTHROCYTE [DISTWIDTH] IN BLOOD BY AUTOMATED COUNT: 12.6 % (ref 12.3–15.4)
FOLATE SERPL-MCNC: 7.04 NG/ML (ref 4.78–24.2)
GLOBULIN UR ELPH-MCNC: 3.5 GM/DL
GLUCOSE SERPL-MCNC: 90 MG/DL (ref 65–99)
HCT VFR BLD AUTO: 44.4 % (ref 34–46.6)
HCV AB SER QL: NORMAL
HDLC SERPL QL: 6.07
HDLC SERPL-MCNC: 41 MG/DL (ref 40–60)
HGB BLD-MCNC: 15.2 G/DL (ref 12–15.9)
LDLC SERPL CALC-MCNC: 191 MG/DL (ref 0–100)
MCH RBC QN AUTO: 29.1 PG (ref 26.6–33)
MCHC RBC AUTO-ENTMCNC: 34.2 G/DL (ref 31.5–35.7)
MCV RBC AUTO: 84.9 FL (ref 79–97)
PLATELET # BLD AUTO: 174 10*3/MM3 (ref 140–450)
PMV BLD AUTO: 10.5 FL (ref 6–12)
POTASSIUM SERPL-SCNC: 3.4 MMOL/L (ref 3.5–5.2)
PROT SERPL-MCNC: 7.9 G/DL (ref 6–8.5)
RBC # BLD AUTO: 5.23 10*6/MM3 (ref 3.77–5.28)
SODIUM SERPL-SCNC: 140 MMOL/L (ref 136–145)
TRIGL SERPL-MCNC: 97 MG/DL (ref 0–150)
TSH SERPL DL<=0.05 MIU/L-ACNC: 1.62 UIU/ML (ref 0.27–4.2)
VIT B12 BLD-MCNC: 691 PG/ML (ref 211–946)
VLDLC SERPL-MCNC: 17 MG/DL (ref 5–40)
WBC NRBC COR # BLD AUTO: 6.5 10*3/MM3 (ref 3.4–10.8)

## 2024-10-01 PROCEDURE — 85027 COMPLETE CBC AUTOMATED: CPT | Performed by: STUDENT IN AN ORGANIZED HEALTH CARE EDUCATION/TRAINING PROGRAM

## 2024-10-01 PROCEDURE — 86803 HEPATITIS C AB TEST: CPT | Performed by: STUDENT IN AN ORGANIZED HEALTH CARE EDUCATION/TRAINING PROGRAM

## 2024-10-01 PROCEDURE — 80053 COMPREHEN METABOLIC PANEL: CPT | Performed by: STUDENT IN AN ORGANIZED HEALTH CARE EDUCATION/TRAINING PROGRAM

## 2024-10-01 PROCEDURE — 82746 ASSAY OF FOLIC ACID SERUM: CPT

## 2024-10-01 PROCEDURE — 84443 ASSAY THYROID STIM HORMONE: CPT | Performed by: STUDENT IN AN ORGANIZED HEALTH CARE EDUCATION/TRAINING PROGRAM

## 2024-10-01 PROCEDURE — 82607 VITAMIN B-12: CPT

## 2024-10-01 PROCEDURE — 80061 LIPID PANEL: CPT | Performed by: STUDENT IN AN ORGANIZED HEALTH CARE EDUCATION/TRAINING PROGRAM

## 2024-10-03 DIAGNOSIS — R94.4 DECREASED GFR: ICD-10-CM

## 2024-10-03 DIAGNOSIS — E87.6 HYPOKALEMIA: Primary | ICD-10-CM

## 2024-10-17 DIAGNOSIS — F41.1 GENERALIZED ANXIETY DISORDER: ICD-10-CM

## 2024-10-18 RX ORDER — DULOXETIN HYDROCHLORIDE 20 MG/1
CAPSULE, DELAYED RELEASE ORAL
Qty: 30 CAPSULE | Refills: 0 | Status: SHIPPED | OUTPATIENT
Start: 2024-10-18

## 2024-10-29 ENCOUNTER — OFFICE VISIT (OUTPATIENT)
Age: 49
End: 2024-10-29
Payer: COMMERCIAL

## 2024-10-29 ENCOUNTER — LAB (OUTPATIENT)
Dept: LAB | Facility: HOSPITAL | Age: 49
End: 2024-10-29
Payer: COMMERCIAL

## 2024-10-29 VITALS
BODY MASS INDEX: 34.45 KG/M2 | OXYGEN SATURATION: 98 % | SYSTOLIC BLOOD PRESSURE: 122 MMHG | DIASTOLIC BLOOD PRESSURE: 82 MMHG | HEART RATE: 65 BPM | WEIGHT: 220 LBS

## 2024-10-29 DIAGNOSIS — E87.6 HYPOKALEMIA: ICD-10-CM

## 2024-10-29 DIAGNOSIS — F33.0 MILD EPISODE OF RECURRENT MAJOR DEPRESSIVE DISORDER: ICD-10-CM

## 2024-10-29 DIAGNOSIS — F41.1 GENERALIZED ANXIETY DISORDER: Primary | ICD-10-CM

## 2024-10-29 DIAGNOSIS — R94.4 DECREASED GFR: ICD-10-CM

## 2024-10-29 LAB
1OH-MIDAZOLAM UR QL SCN: NOT DETECTED NG/MG CREAT
6MAM UR QL SCN: NEGATIVE NG/ML
7AMINOCLONAZEPAM/CREAT UR: NOT DETECTED NG/MG CREAT
A-OH ALPRAZ/CREAT UR: NOT DETECTED NG/MG CREAT
A-OH-TRIAZOLAM/CREAT UR CFM: NOT DETECTED NG/MG CREAT
ACP UR QL CFM: NOT DETECTED
ALPRAZ/CREAT UR CFM: NOT DETECTED NG/MG CREAT
AMPHETAMINES UR QL SCN: NEGATIVE NG/ML
APAP UR QL SCN: NEGATIVE UG/ML
BARBITURATES UR QL SCN: NEGATIVE NG/ML
BENZODIAZ SCN METH UR: NEGATIVE
BUPRENORPHINE UR QL SCN: NEGATIVE
BUPRENORPHINE/CREAT UR: NOT DETECTED NG/MG CREAT
CANNABINOIDS UR QL SCN: NEGATIVE NG/ML
CARISOPRODOL UR QL: NEGATIVE NG/ML
CLONAZEPAM/CREAT UR CFM: NOT DETECTED NG/MG CREAT
COCAINE+BZE UR QL SCN: NEGATIVE NG/ML
CREAT UR-MCNC: 197 MG/DL
D-METHORPHAN UR-MCNC: NOT DETECTED NG/ML
D-METHORPHAN+LEVORPHANOL UR QL: NOT DETECTED
DESALKYLFLURAZ/CREAT UR: NOT DETECTED NG/MG CREAT
DIAZEPAM/CREAT UR: NOT DETECTED NG/MG CREAT
ETHANOL UR QL SCN: NEGATIVE G/DL
ETHANOL UR QL SCN: NEGATIVE NG/ML
FENTANYL CTO UR SCN-MCNC: NEGATIVE NG/ML
FENTANYL/CREAT UR: NOT DETECTED NG/MG CREAT
FLUNITRAZEPAM UR QL SCN: NOT DETECTED NG/MG CREAT
GABAPENTIN UR-MCNC: NEGATIVE UG/ML
HALLUCINOGENS UR: NEGATIVE
HYPNOTICS UR QL SCN: NEGATIVE
KETAMINE UR QL: NOT DETECTED
LORAZEPAM/CREAT UR: NOT DETECTED NG/MG CREAT
MEPERIDINE UR QL SCN: NEGATIVE NG/ML
METHADONE UR QL SCN: NEGATIVE NG/ML
METHADONE+METAB UR QL SCN: NEGATIVE NG/ML
MIDAZOLAM/CREAT UR CFM: NOT DETECTED NG/MG CREAT
MISCELLANEOUS, UR: NEGATIVE
NORBUPRENORPHINE/CREAT UR: NOT DETECTED NG/MG CREAT
NORDIAZEPAM/CREAT UR: NOT DETECTED NG/MG CREAT
NORFENTANYL/CREAT UR: NOT DETECTED NG/MG CREAT
NORFLUNITRAZEPAM UR-MCNC: NOT DETECTED NG/MG CREAT
NORKETAMINE UR-MCNC: NOT DETECTED UG/ML
OPIATES UR SCN-MCNC: NEGATIVE NG/ML
OXAZEPAM/CREAT UR: NOT DETECTED NG/MG CREAT
OXYCODONE CTO UR SCN-MCNC: NEGATIVE NG/ML
PCP UR QL SCN: NEGATIVE NG/ML
PRESCRIBED MEDICATIONS: NORMAL
PROPOXYPH UR QL SCN: NEGATIVE NG/ML
TAPENTADOL CTO UR SCN-MCNC: NEGATIVE NG/ML
TEMAZEPAM/CREAT UR: NOT DETECTED NG/MG CREAT
TRAMADOL UR QL SCN: NEGATIVE NG/ML
ZALEPLON UR-MCNC: NOT DETECTED NG/ML
ZOLPIDEM PHENYL-4-CARB UR QL SCN: NOT DETECTED
ZOLPIDEM UR QL SCN: NOT DETECTED
ZOPICLONE-N-OXIDE UR-MCNC: NOT DETECTED NG/ML

## 2024-10-29 PROCEDURE — 80048 BASIC METABOLIC PNL TOTAL CA: CPT

## 2024-10-29 RX ORDER — PAROXETINE 20 MG/1
20 TABLET, FILM COATED ORAL DAILY
Qty: 30 TABLET | Refills: 2 | Status: SHIPPED | OUTPATIENT
Start: 2024-10-29 | End: 2025-10-29

## 2024-10-29 NOTE — PROGRESS NOTES
Follow Up Office Visit      Patient Name: Jeannette Aviles  : 1975   MRN: [id number]     Referring Provider: Adelaide Mc DO    Chief Complaint:      ICD-10-CM ICD-9-CM   1. Generalized anxiety disorder  F41.1 300.02   2. Mild episode of recurrent major depressive disorder  F33.0 296.31     Answers submitted by the patient for this visit:  Primary Reason for Visit (Submitted on 10/23/2024)  What is the primary reason for your visit?: Anxiety  Anxiety (Submitted on 10/23/2024)  Chief Complaint: Anxiety  Visit: follow-up  Frequency: constantly  Severity: moderate  depressed mood: Yes  insomnia: Yes  malaise/fatigue: Yes  Sleep quality: fair  Hours of sleep per night: 8 Hours  Aggravated by: work stress  Medication compliance: %  Side effects: fatigue     History of Present Illness:   Jeannette Aviles is a 49 y.o. female who is here today for follow up and medication management.    Subjective      Patient Reports:   Patient reports symptoms have been unchanged with medication increase. Patient reports recently going back to work and reports increased frustrations. Patient report disengaging when situations become intense.    Patient rates anxiety 7/10. Patient reports recent panic attacks, last one about 1 month ago. Patient denies anger outburst and lashing out. Patient denies exaggerated sense of well being or self-confidence. Patient denies decreased need for sleep. Patient denies impulsive and risk-taking behaviors. Patient reports occasional dizziness and has been following up with provider and cardiologist. Patient reports Holter monitoring, CT and Ultrasound results came back normal.  Patient is restless and fidgety throughout entire assessment.    Patient rates depression 1/10. Patient denies SI/HI/AVH.    PHQ-9= 16 increased score from previous visit  CASSANDRA-7= 15 decreased score from previous visit    Review of Systems:   Review of Systems   Constitutional:  Positive for  diaphoresis and fatigue.   Respiratory:  Positive for shortness of breath.    Cardiovascular:  Positive for palpitations.   Neurological:  Positive for dizziness, syncope and light-headedness.   Psychiatric/Behavioral:  Positive for agitation and dysphoric mood. The patient is nervous/anxious.    Sleep pattern: 7-10 hours per night  Appetite: decreased     Patient has been following up with provider and cardiologist for diaphoresis, fatigue, shortness of breath, palpitations, dizziness, syncope, and lightheadedness.    PHQ-9 Depression Screening  Little interest or pleasure in doing things? Several days   Feeling down, depressed, or hopeless? Several days   PHQ-2 Total Score 2   Trouble falling or staying asleep, or sleeping too much? Almost all   Feeling tired or having little energy? Almost all   Poor appetite or overeating? Over half   Feeling bad about yourself - or that you are a failure or have let yourself or your family down? Not at all   Trouble concentrating on things, such as reading the newspaper or watching television? Almost all   Moving or speaking so slowly that other people could have noticed? Or the opposite - being so fidgety or restless that you have been moving around a lot more than usual? Almost all   Thoughts that you would be better off dead, or of hurting yourself in some way? Not at all   PHQ-9 Total Score 16   If you checked off any problems, how difficult have these problems made it for you to do your work, take care of things at home, or get along with other people? Very difficult       CASSANDRA-7 Anxiety Screening  Over the last two weeks, how often have you been bothered by the following problems?  Feeling nervous, anxious or on edge: Nearly every day  Not being able to stop or control worrying: More than half the days  Worrying too much about different things: More than half the days  Trouble Relaxing: Nearly every day  Being so restless that it is hard to sit still: Nearly every  day  Becoming easily annoyed or irritable: More than half the days  Feeling afraid as if something awful might happen: Not at all  CASSANDRA 7 Total Score: 15  If you checked any problems, how difficult have these problems made it for you to do your work, take care of things at home, or get along with other people: Somewhat difficult    RISK ASSESSMENT:  Patient denies any thoughts or intent of suicide today. Patient denies any impulsive behavior today.     Medications:     Current Outpatient Medications:     DULoxetine (CYMBALTA) 20 MG capsule, Take 1 capsule by mouth daily in addition to the 60 mg capsule., Disp: 30 capsule, Rfl: 0    DULoxetine (CYMBALTA) 60 MG capsule, Take 1 capsule by mouth Daily., Disp: 90 capsule, Rfl: 3    famotidine (PEPCID) 40 MG tablet, Take 1 tablet by mouth 2 (Two) Times a Day., Disp: 180 tablet, Rfl: 3    fexofenadine (ALLEGRA) 180 MG tablet, Take 1 tablet by mouth Daily., Disp: , Rfl:     hydroCHLOROthiazide 12.5 MG tablet, Take 1 tablet by mouth Daily., Disp: 90 tablet, Rfl: 3    levothyroxine (SYNTHROID, LEVOTHROID) 125 MCG tablet, Take 1 tablet by mouth Daily., Disp: 90 tablet, Rfl: 3    losartan (COZAAR) 100 MG tablet, Take 1 tablet by mouth Daily., Disp: 90 tablet, Rfl: 3    meloxicam (MOBIC) 7.5 MG tablet, Take 1-2 tablets by mouth Daily As Needed for Moderate Pain or Mild Pain., Disp: 120 tablet, Rfl: 2    Vitamin E 180 MG (400 UNIT) capsule capsule, , Disp: , Rfl:     PARoxetine (PAXIL) 20 MG tablet, Take 1 tablet by mouth Daily., Disp: 30 tablet, Rfl: 2    Medication Considerations:  ROB reviewed and appropriate.      Allergies:   Allergies   Allergen Reactions    Aspartame Headache     migraines    Atorvastatin Myalgia    Buspirone Dizziness    Corticosteroids Hives and Swelling    Latex Hives       Results Reviewed:     Objective     Physical Exam:  Vital Signs:   Vitals:    10/29/24 1201   BP: 122/82   Pulse: 65   SpO2: 98%   Weight: 99.8 kg (220 lb)     Body mass index is  34.45 kg/m².     Mental Status Exam:   MENTAL STATUS EXAM   General Appearance:  Cleanly groomed and dressed and well developed  Eye Contact:  Good eye contact  Attitude:  Cooperative  Motor Activity:  Normal gait, posture, fidgety, restless and hyperactive  Muscle Strength:  Normal  Speech:  Normal rate, tone, volume  Language:  Spontaneous  Mood and affect:  Anxious  Hopelessness:  Denies  Loneliness: Denies  Thought Process:  Logical  Associations/ Thought Content:  No delusions  Hallucinations:  None  Suicidal Ideations:  Not present  Homicidal Ideation:  Not present  Sensorium:  Alert and clear  Orientation:  Person, place, time and situation  Immediate Recall, Recent, and Remote Memory:  Intact  Attention Span/ Concentration:  Good  Fund of Knowledge:  Appropriate for age and educational level  Intellectual Functioning:  Average range  Insight:  Fair  Judgement:  Fair  Reliability:  Fair  Impulse Control:  Fair       @RESULASTCBCDIFFPANEL,TSH,LABLIPI,OXHNKPEE40,YIFTGXFN46,MG,FOLATE,PROLACTIN,CRPRESULT,CMP,[id number])@    Lab Results   Component Value Date    GLUCOSE 90 10/01/2024    BUN 19 10/01/2024    CREATININE 1.14 (H) 10/01/2024    EGFR 59.5 (L) 10/01/2024    BCR 16.7 10/01/2024    K 3.4 (L) 10/01/2024    CO2 29.0 10/01/2024    CALCIUM 10.0 10/01/2024    ALBUMIN 4.4 10/01/2024    BILITOT 0.5 10/01/2024    AST 17 10/01/2024    ALT 13 10/01/2024       Lab Results   Component Value Date    WBC 6.50 10/01/2024    HGB 15.2 10/01/2024    HCT 44.4 10/01/2024    MCV 84.9 10/01/2024     10/01/2024       Lab Results   Component Value Date    CHOL 249 (H) 10/01/2024    TRIG 97 10/01/2024    HDL 41 10/01/2024     (H) 10/01/2024       Assessment / Plan      Visit Diagnosis/Orders Placed This Visit:  Diagnoses and all orders for this visit:    1. Generalized anxiety disorder (Primary)  -     GeneSight - Swab,; Future  -     PARoxetine (PAXIL) 20 MG tablet; Take 1 tablet by mouth Daily.  Dispense: 30  tablet; Refill: 2    2. Mild episode of recurrent major depressive disorder  -     GeneSight - Swab,; Future  -     PARoxetine (PAXIL) 20 MG tablet; Take 1 tablet by mouth Daily.  Dispense: 30 tablet; Refill: 2       Functional Status: Moderate impairment     Prognosis: Good with Ongoing Treatment     Impression/Formulation:  Patient appeared alert and oriented.  Patient is voluntarily requesting to continue outpatient psychiatric treatment at [facility] [address]  Patient is receptive to assistance with maintaining a stable lifestyle.  Patient presents with history of     ICD-10-CM ICD-9-CM   1. Generalized anxiety disorder  F41.1 300.02   2. Mild episode of recurrent major depressive disorder  F33.0 296.31   .    Reviewed patient's previous provider notes. Reviewed most recent labs. Patient meets DSM V diagnostic criteria for diagnoses. Diagnoses may be updated as more information becomes available.       Treatment Plan:   Decrease Cymbalta to 40mg PO qd as discussed  Completed GeneSight test during visit  Initiate Paxil 20mg PO qam   Complete Keegan CPT3 and discuss during f/u visit  Follow up in 4 weeks and as needed    Patient will continue supportive psychotherapy efforts and medications as indicated. Clinic will obtain release of information for current treatment team for continuity of care as needed. Patient will contact this office, call 911 or present to the nearest emergency room should suicidal or homicidal ideations occur.  Discussed medication options and treatment plan of prescribed medication(s) as well as the risks, benefits, and potential side effects. Patient acknowledged and verbally consented to continue with current treatment plan and was educated on the importance of compliance with treatment and follow-up appointments.     Quality Measures:   Never smoker    I advised Jeannette Aviles of the risks of tobacco use.     Follow Up:   Return in about 4  weeks (around 11/26/2024).      LYNN Berry  [facility] [address]

## 2024-10-30 LAB
ANION GAP SERPL CALCULATED.3IONS-SCNC: 7 MMOL/L (ref 5–15)
BUN SERPL-MCNC: 30 MG/DL (ref 6–20)
BUN/CREAT SERPL: 23.6 (ref 7–25)
CALCIUM SPEC-SCNC: 9.5 MG/DL (ref 8.6–10.5)
CHLORIDE SERPL-SCNC: 102 MMOL/L (ref 98–107)
CO2 SERPL-SCNC: 28 MMOL/L (ref 22–29)
CREAT SERPL-MCNC: 1.27 MG/DL (ref 0.57–1)
EGFRCR SERPLBLD CKD-EPI 2021: 52.3 ML/MIN/1.73
GLUCOSE SERPL-MCNC: 110 MG/DL (ref 65–99)
POTASSIUM SERPL-SCNC: 3.3 MMOL/L (ref 3.5–5.2)
SODIUM SERPL-SCNC: 137 MMOL/L (ref 136–145)

## 2024-11-01 ENCOUNTER — PATIENT MESSAGE (OUTPATIENT)
Dept: FAMILY MEDICINE CLINIC | Facility: CLINIC | Age: 49
End: 2024-11-01
Payer: COMMERCIAL

## 2024-11-01 DIAGNOSIS — E87.6 HYPOKALEMIA: Primary | ICD-10-CM

## 2024-11-01 DIAGNOSIS — R94.4 DECREASED GFR: ICD-10-CM

## 2024-11-01 DIAGNOSIS — I10 PRIMARY HYPERTENSION: ICD-10-CM

## 2024-11-05 RX ORDER — AMLODIPINE BESYLATE 5 MG/1
5 TABLET ORAL DAILY
Qty: 30 TABLET | Refills: 2 | Status: SHIPPED | OUTPATIENT
Start: 2024-11-05

## 2024-11-08 DIAGNOSIS — F33.0 MILD EPISODE OF RECURRENT MAJOR DEPRESSIVE DISORDER: ICD-10-CM

## 2024-11-08 DIAGNOSIS — F41.1 GENERALIZED ANXIETY DISORDER: ICD-10-CM

## 2024-11-12 ENCOUNTER — LAB (OUTPATIENT)
Dept: LAB | Facility: HOSPITAL | Age: 49
End: 2024-11-12
Payer: COMMERCIAL

## 2024-11-12 DIAGNOSIS — I10 PRIMARY HYPERTENSION: ICD-10-CM

## 2024-11-12 DIAGNOSIS — R94.4 DECREASED GFR: ICD-10-CM

## 2024-11-12 DIAGNOSIS — E87.6 HYPOKALEMIA: ICD-10-CM

## 2024-11-12 LAB
ANION GAP SERPL CALCULATED.3IONS-SCNC: 7.6 MMOL/L (ref 5–15)
BACTERIA UR QL AUTO: ABNORMAL /HPF
BILIRUB UR QL STRIP: NEGATIVE
BUN SERPL-MCNC: 23 MG/DL (ref 6–20)
BUN/CREAT SERPL: 17.8 (ref 7–25)
CALCIUM SPEC-SCNC: 9.8 MG/DL (ref 8.6–10.5)
CHLORIDE SERPL-SCNC: 105 MMOL/L (ref 98–107)
CLARITY UR: CLEAR
CO2 SERPL-SCNC: 27.4 MMOL/L (ref 22–29)
COLOR UR: YELLOW
CREAT SERPL-MCNC: 1.29 MG/DL (ref 0.57–1)
EGFRCR SERPLBLD CKD-EPI 2021: 51.3 ML/MIN/1.73
GLUCOSE SERPL-MCNC: 68 MG/DL (ref 65–99)
GLUCOSE UR STRIP-MCNC: NEGATIVE MG/DL
HGB UR QL STRIP.AUTO: NEGATIVE
HYALINE CASTS UR QL AUTO: ABNORMAL /LPF
KETONES UR QL STRIP: NEGATIVE
LEUKOCYTE ESTERASE UR QL STRIP.AUTO: NEGATIVE
NITRITE UR QL STRIP: NEGATIVE
PH UR STRIP.AUTO: 5.5 [PH] (ref 5–8)
POTASSIUM SERPL-SCNC: 3.7 MMOL/L (ref 3.5–5.2)
PROT UR QL STRIP: NEGATIVE
RBC # UR STRIP: ABNORMAL /HPF
REF LAB TEST METHOD: ABNORMAL
SODIUM SERPL-SCNC: 140 MMOL/L (ref 136–145)
SP GR UR STRIP: 1.02 (ref 1–1.03)
SQUAMOUS #/AREA URNS HPF: ABNORMAL /HPF
UROBILINOGEN UR QL STRIP: NORMAL
WBC # UR STRIP: ABNORMAL /HPF

## 2024-11-12 PROCEDURE — 81001 URINALYSIS AUTO W/SCOPE: CPT

## 2024-11-12 PROCEDURE — 80048 BASIC METABOLIC PNL TOTAL CA: CPT

## 2024-11-18 ENCOUNTER — OFFICE VISIT (OUTPATIENT)
Dept: FAMILY MEDICINE CLINIC | Facility: CLINIC | Age: 49
End: 2024-11-18
Payer: COMMERCIAL

## 2024-11-18 VITALS
OXYGEN SATURATION: 99 % | HEIGHT: 67 IN | DIASTOLIC BLOOD PRESSURE: 72 MMHG | BODY MASS INDEX: 34.81 KG/M2 | HEART RATE: 81 BPM | SYSTOLIC BLOOD PRESSURE: 124 MMHG | WEIGHT: 221.8 LBS

## 2024-11-18 DIAGNOSIS — N18.31 STAGE 3A CHRONIC KIDNEY DISEASE: ICD-10-CM

## 2024-11-18 DIAGNOSIS — I10 PRIMARY HYPERTENSION: Primary | ICD-10-CM

## 2024-11-18 DIAGNOSIS — G89.29 CHRONIC BILATERAL THORACIC BACK PAIN: ICD-10-CM

## 2024-11-18 DIAGNOSIS — N62 LARGE BREASTS: ICD-10-CM

## 2024-11-18 DIAGNOSIS — Z41.1 ENCOUNTER FOR BREAST AUGMENTATION: ICD-10-CM

## 2024-11-18 DIAGNOSIS — M54.6 CHRONIC BILATERAL THORACIC BACK PAIN: ICD-10-CM

## 2024-11-18 PROCEDURE — 99214 OFFICE O/P EST MOD 30 MIN: CPT | Performed by: STUDENT IN AN ORGANIZED HEALTH CARE EDUCATION/TRAINING PROGRAM

## 2024-11-18 RX ORDER — LOSARTAN POTASSIUM 50 MG/1
50 TABLET ORAL DAILY
Qty: 30 TABLET | Refills: 2 | Status: SHIPPED | OUTPATIENT
Start: 2024-11-18

## 2024-11-18 RX ORDER — AMLODIPINE BESYLATE 10 MG/1
10 TABLET ORAL DAILY
Qty: 30 TABLET | Refills: 2 | Status: SHIPPED | OUTPATIENT
Start: 2024-11-18

## 2024-11-18 NOTE — PROGRESS NOTES
Established Office Visit      Patient Name: Jeannette Aviles  : 1975   MRN: [id number]   Care Team: Patient Care Team:  Adelaide Mc DO as PCP - General (Internal Medicine)  Marialuisa Lu MD as Consulting Physician (Endocrinology)  Rony Goode MD as Consulting Physician (Cardiology)  Adelaide Mc DO as Referring Physician (Internal Medicine)    Chief Complaint:    Chief Complaint   Patient presents with   • lump      Swollen, tender, upper stomach area        History of Present Illness: Jeannette Aviles is a 49 y.o. female with anxiety/depression, dermatitis, hypothyroidism, HTN, migraine without aura, chronic low back pain 2/2 lumbar DDD who is here today to follow up with lump and CKD.    Decline in GFR noted on last labs (Cr 1.27, 1.29 and GFR 52, 53). She was referred to nephrology and renal US was ordered but not yet performed.     HTN - well controlled with amlodipine and losartan. She is interested in decreasing losartan as she feels this may have contributed to her decline in renal function. It was started about 2 years ago.     She reports lump near her xyphoid process. Flares and becomes tender 1-2x per year. Does not rise to surface. No skin changes. No discharge. No trauma or injury  She reports chronic thoracic back pain related to large breasts. She is interested in augmentation to decrease breast size due to impact it has had on her back and quality of life. She wears supportive braw with underwire.       Subjective      Review of Systems:   Review of Systems - See HPI    I have reviewed and the following portions of the patient's history were updated as appropriate: past family history, past medical history, past social history, past surgical history and problem list.    Medications:     Current Outpatient Medications:   •  DULoxetine (CYMBALTA) 20 MG capsule, Take 1 capsule by mouth daily in addition to the 60 mg capsule., Disp: 30 capsule, Rfl: 0  •   "DULoxetine (CYMBALTA) 60 MG capsule, Take 1 capsule by mouth Daily. (Patient taking differently: Take 40 mg by mouth Daily.), Disp: 90 capsule, Rfl: 3  •  famotidine (PEPCID) 40 MG tablet, Take 1 tablet by mouth 2 (Two) Times a Day., Disp: 180 tablet, Rfl: 3  •  levothyroxine (SYNTHROID, LEVOTHROID) 125 MCG tablet, Take 1 tablet by mouth Daily., Disp: 90 tablet, Rfl: 3  •  PARoxetine (PAXIL) 20 MG tablet, Take 1 tablet by mouth Daily., Disp: 30 tablet, Rfl: 2  •  Vitamin E 180 MG (400 UNIT) capsule capsule, , Disp: , Rfl:   •  amLODIPine (NORVASC) 10 MG tablet, Take 1 tablet by mouth Daily., Disp: 30 tablet, Rfl: 2  •  losartan (COZAAR) 50 MG tablet, Take 1 tablet by mouth Daily., Disp: 30 tablet, Rfl: 2    Allergies:   Allergies   Allergen Reactions   • Aspartame Headache     migraines   • Atorvastatin Myalgia   • Buspirone Dizziness   • Corticosteroids Hives and Swelling   • Latex Hives       Objective     Physical Exam:  Vital Signs:   Vitals:    11/18/24 0807   BP: 124/72   Pulse: 81   SpO2: 99%   Weight: 101 kg (221 lb 12.8 oz)   Height: 170.2 cm (67.01\")     Body mass index is 34.73 kg/m².     Physical Exam  Vitals reviewed.   Constitutional:       Appearance: Normal appearance.   Cardiovascular:      Rate and Rhythm: Normal rate.   Pulmonary:      Effort: Pulmonary effort is normal. No respiratory distress.   Musculoskeletal:      Comments: TTP along xyphoid process - no palpable mass. No skin changes. Normal rib mechanics.    Skin:     General: Skin is warm and dry.   Neurological:      Mental Status: She is alert.   Psychiatric:         Mood and Affect: Mood normal.         Behavior: Behavior normal.         Judgment: Judgment normal.         Assessment / Plan      Assessment/Plan:   Problems Addressed This Visit  Diagnoses and all orders for this visit:    1. Primary hypertension (Primary)  -     amLODIPine (NORVASC) 10 MG tablet; Take 1 tablet by mouth Daily.  Dispense: 30 tablet; Refill: 2  -     " losartan (COZAAR) 50 MG tablet; Take 1 tablet by mouth Daily.  Dispense: 30 tablet; Refill: 2    2. Stage 3a chronic kidney disease    3. Chronic bilateral thoracic back pain  -     Ambulatory Referral to Physical Therapy for Evaluation & Treatment  -     Ambulatory Referral to Plastic Surgery    4. Large breasts  -     Ambulatory Referral to Plastic Surgery    5. Encounter for breast augmentation  -     Ambulatory Referral to Plastic Surgery      HTN - well controlled. Wants to decrease losartan - will decrease from 100mg to 50mg and increase amlodipine from 5mg to 10mg. Goal BP <130/80    CKD 3 - referred to nephrology and for renal US - will follow up on this     Chronic thoracic/xyphoid pain ongoing for years, related to large breast size - interested in reduction. Counseled on importance of breast support. Encouraged her to try compressive sportsbra without under wire which I feel is irritating her along the xyphoid. Referred to PT and to plastics for augmentation consultation. \    Plan of care reviewed with patient at the conclusion of today's visit. Education was provided regarding diagnosis and management.  Patient verbalizes understanding of and agreement with management plan.    Follow Up: 3 months or annual, sooner as needed   No follow-ups on file.        DO RADHA Mackay PC [address]  [facility]  [address]  [facility] [address]  Fax [phone]  Phone [phone]

## 2024-11-22 DIAGNOSIS — F41.1 GENERALIZED ANXIETY DISORDER: ICD-10-CM

## 2024-11-25 RX ORDER — DULOXETIN HYDROCHLORIDE 20 MG/1
CAPSULE, DELAYED RELEASE ORAL
Qty: 30 CAPSULE | Refills: 0 | Status: SHIPPED | OUTPATIENT
Start: 2024-11-25

## 2024-11-26 ENCOUNTER — OFFICE VISIT (OUTPATIENT)
Age: 49
End: 2024-11-26
Payer: COMMERCIAL

## 2024-11-26 VITALS
SYSTOLIC BLOOD PRESSURE: 114 MMHG | HEART RATE: 109 BPM | OXYGEN SATURATION: 98 % | WEIGHT: 219 LBS | BODY MASS INDEX: 34.29 KG/M2 | DIASTOLIC BLOOD PRESSURE: 78 MMHG

## 2024-11-26 DIAGNOSIS — F41.1 GENERALIZED ANXIETY DISORDER: Primary | ICD-10-CM

## 2024-11-26 DIAGNOSIS — F33.0 MILD EPISODE OF RECURRENT MAJOR DEPRESSIVE DISORDER: ICD-10-CM

## 2024-11-26 RX ORDER — FEXOFENADINE HCL 180 MG/1
180 TABLET ORAL AS NEEDED
COMMUNITY

## 2024-11-26 RX ORDER — PAROXETINE 30 MG/1
30 TABLET, FILM COATED ORAL DAILY
Qty: 30 TABLET | Refills: 2 | Status: SHIPPED | OUTPATIENT
Start: 2024-11-26 | End: 2025-11-26

## 2024-11-26 NOTE — PROGRESS NOTES
Follow Up Office Visit      Patient Name: Jeannette Aviles  : 1975   MRN: [id number]     Referring Provider: Adelaide Mc DO    Chief Complaint:      ICD-10-CM ICD-9-CM   1. Generalized anxiety disorder  F41.1 300.02   2. Mild episode of recurrent major depressive disorder  F33.0 296.31        History of Present Illness:   Jeannette Aviles is a 49 y.o. female who is here today for follow up and medication management.    Answers submitted by the patient for this visit:  Primary Reason for Visit (Submitted on 2024)  What is the primary reason for your visit?: Anxiety  Anxiety (Submitted on 2024)  Chief Complaint: Anxiety  Visit: follow-up  Frequency: constantly  Severity: severe  excessive worry: Yes  insomnia: Yes  irritability: Yes  malaise/fatigue: Yes  Sleep quality: fair  Hours of sleep per night: 8 Hours  Aggravated by: work stress  Medication compliance: %  Side effects: fatigue      Subjective      Patient Reports:   Patient reports still struggling with increased stress at work and is unhappy with her job. Patient report applying for an accomodation to help with recovery during breaks at work. Patient reports having to go back to working 40 hours next week. Patient reports recent labs show kidneys are filtering 53%. Patient reports experiencing increased stress because of this. Patient reports recent medication changes and had to discontinue meloxicam. Patient reports increased anxiety related to increased pain. Patient reports also experiencing increased heart burn due to medication changes. Patient reports referral for nephrologist and an ultrasound scheduled.     Patient reports scheduled appointment with cardiologist in 2025 for diaphoresis, fatigue, shortness of breath, palpitations, dizziness, syncope, and lightheadedness.     Patient rates anxiety 6/10. Patient denies panic attacks. Patient reports she felt one starting, but left work. Patient denies  mood swings, anger outburst and lashing out. Patient is restless and fidgety throughout entire assessment.     Patient rates depression 4/10. Patient denies SI/HI/AVH.    Discussed Keegan CPT3 results: Inattentiveness (some indication)    PHQ-9= 13 decreased score from previous visit   CASSANDRA-7= 16  increased score from previous visit     Review of Systems:   Review of Systems   Constitutional:  Positive for appetite change, diaphoresis and fatigue.   Cardiovascular:  Positive for palpitations.   Neurological:  Positive for dizziness, syncope and light-headedness.   Psychiatric/Behavioral:  Positive for dysphoric mood. The patient is nervous/anxious.    Sleep pattern: 8 hours per night  Appetite: decreased    Patient reports scheduled appoint with cardiologist for diaphoresis, fatigue, shortness of breath, palpitations, dizziness, syncope, and lightheadedness.    PHQ-9 Depression Screening  Little interest or pleasure in doing things? Over half   Feeling down, depressed, or hopeless? Over half   PHQ-2 Total Score 4   Trouble falling or staying asleep, or sleeping too much? Over half   Feeling tired or having little energy? Almost all   Poor appetite or overeating? Almost all   Feeling bad about yourself - or that you are a failure or have let yourself or your family down? Not at all   Trouble concentrating on things, such as reading the newspaper or watching television? Several days   Moving or speaking so slowly that other people could have noticed? Or the opposite - being so fidgety or restless that you have been moving around a lot more than usual? Not at all   Thoughts that you would be better off dead, or of hurting yourself in some way? Not at all   PHQ-9 Total Score 13   If you checked off any problems, how difficult have these problems made it for you to do your work, take care of things at home, or get along with other people? Very difficult       CASSANDRA-7 Anxiety Screening  Over the last two weeks, how often  have you been bothered by the following problems?  Feeling nervous, anxious or on edge: Nearly every day  Not being able to stop or control worrying: More than half the days  Worrying too much about different things: More than half the days  Trouble Relaxing: Nearly every day  Being so restless that it is hard to sit still: Nearly every day  Becoming easily annoyed or irritable: More than half the days  Feeling afraid as if something awful might happen: Several days  CASSANDRA 7 Total Score: 16  If you checked any problems, how difficult have these problems made it for you to do your work, take care of things at home, or get along with other people: Very difficult    RISK ASSESSMENT:  Patient denies any thoughts or intent of suicide today. Patient denies any impulsive behavior today.     Medications:     Current Outpatient Medications:     amLODIPine (NORVASC) 10 MG tablet, Take 1 tablet by mouth Daily., Disp: 30 tablet, Rfl: 2    DULoxetine (CYMBALTA) 60 MG capsule, Take 1 capsule by mouth Daily. (Patient taking differently: Take 40 mg by mouth Daily.), Disp: 90 capsule, Rfl: 3    famotidine (PEPCID) 40 MG tablet, Take 1 tablet by mouth 2 (Two) Times a Day., Disp: 180 tablet, Rfl: 3    fexofenadine (ALLEGRA) 180 MG tablet, Take 1 tablet by mouth As Needed., Disp: , Rfl:     levothyroxine (SYNTHROID, LEVOTHROID) 125 MCG tablet, Take 1 tablet by mouth Daily., Disp: 90 tablet, Rfl: 3    losartan (COZAAR) 50 MG tablet, Take 1 tablet by mouth Daily., Disp: 30 tablet, Rfl: 2    Vitamin E 180 MG (400 UNIT) capsule capsule, , Disp: , Rfl:     DULoxetine (CYMBALTA) 20 MG capsule, Take 1 capsule by mouth daily in addition to the 60 mg capsule. (Patient not taking: Reported on 11/26/2024), Disp: 30 capsule, Rfl: 0    PARoxetine (PAXIL) 30 MG tablet, Take 1 tablet by mouth Daily., Disp: 30 tablet, Rfl: 2    Medication Considerations:  ROB reviewed and appropriate.      Allergies:   Allergies   Allergen Reactions    Aspartame  Headache     migraines    Atorvastatin Myalgia    Buspirone Dizziness    Corticosteroids Hives and Swelling    Latex Hives       Results Reviewed:     Objective     Physical Exam:  Vital Signs:   Vitals:    11/26/24 1014   BP: 114/78   Pulse: 109   SpO2: 98%   Weight: 99.3 kg (219 lb)     Body mass index is 34.29 kg/m².     Mental Status Exam:   MENTAL STATUS EXAM   General Appearance:  Cleanly groomed and dressed and well developed  Eye Contact:  Good eye contact  Attitude:  Cooperative  Motor Activity:  Restless, fidgety, hyperactive and normal gait, posture  Muscle Strength:  Normal  Speech:  Normal rate, tone, volume  Language:  Spontaneous  Mood and affect:  Anxious  Hopelessness:  Denies  Loneliness: Denies  Thought Process:  Logical  Associations/ Thought Content:  No delusions  Hallucinations:  None  Suicidal Ideations:  Not present  Homicidal Ideation:  Not present  Sensorium:  Alert and clear  Orientation:  Person, place, time and situation  Immediate Recall, Recent, and Remote Memory:  Intact  Attention Span/ Concentration:  Good  Fund of Knowledge:  Appropriate for age and educational level  Intellectual Functioning:  Average range  Insight:  Fair  Judgement:  Fair  Reliability:  Fair  Impulse Control:  Fair       @RESULASTCBCDIFFPANEL,TSH,LABLIPI,[id number],PEEOPSSF37,MG,FOLATE,PROLACTIN,CRPRESULT,CMP,[id number])@    Lab Results   Component Value Date    GLUCOSE 68 11/12/2024    BUN 23 (H) 11/12/2024    CREATININE 1.29 (H) 11/12/2024    EGFR 51.3 (L) 11/12/2024    BCR 17.8 11/12/2024    K 3.7 11/12/2024    CO2 27.4 11/12/2024    CALCIUM 9.8 11/12/2024    ALBUMIN 4.4 10/01/2024    BILITOT 0.5 10/01/2024    AST 17 10/01/2024    ALT 13 10/01/2024       Lab Results   Component Value Date    WBC 6.50 10/01/2024    HGB 15.2 10/01/2024    HCT 44.4 10/01/2024    MCV 84.9 10/01/2024     10/01/2024       Lab Results   Component Value Date    CHOL 249 (H) 10/01/2024    TRIG 97 10/01/2024    HDL 41  10/01/2024     (H) 10/01/2024       Assessment / Plan      Visit Diagnosis/Orders Placed This Visit:  Diagnoses and all orders for this visit:    1. Generalized anxiety disorder (Primary)  -     PARoxetine (PAXIL) 30 MG tablet; Take 1 tablet by mouth Daily.  Dispense: 30 tablet; Refill: 2    2. Mild episode of recurrent major depressive disorder  -     PARoxetine (PAXIL) 30 MG tablet; Take 1 tablet by mouth Daily.  Dispense: 30 tablet; Refill: 2       Functional Status: Mild impairment     Prognosis: Good with Ongoing Treatment     Impression/Formulation:  Patient appeared alert and oriented.  Patient is voluntarily requesting to continue outpatient psychiatric treatment at [facility] [address] [facility].  Patient is receptive to assistance with maintaining a stable lifestyle.  Patient presents with history of     ICD-10-CM ICD-9-CM   1. Generalized anxiety disorder  F41.1 300.02   2. Mild episode of recurrent major depressive disorder  F33.0 296.31   .    Reviewed patient's previous provider notes. Reviewed most recent labs. Patient meets DSM V diagnostic criteria for diagnoses. Diagnoses may be updated as more information becomes available.       Treatment Plan:   Increase Paxil 30 mg PO qd  Discontinue Cymbalta as discussed over 2 weeks  Discussed Keegan CPT3 during visit and completing other screeners in the future  Discussed GeneSight Results  Encouraged psychotherapy  Follow-up in 6 weeks and as needed    Patient will continue supportive psychotherapy efforts and medications as indicated. Clinic will obtain release of information for current treatment team for continuity of care as needed. Patient will contact this office, call 911 or present to the nearest emergency room should suicidal or homicidal ideations occur.  Discussed medication options and treatment plan of prescribed medication(s) as well as the risks, benefits, and potential side effects. Patient acknowledged and  verbally consented to continue with current treatment plan and was educated on the importance of compliance with treatment and follow-up appointments.     Quality Measures:   Never smoker    I advised Jeannette Aviles of the risks of tobacco use.     Follow Up:   Return in about 6 weeks (around 1/7/2025), or 30 min appointment.      LYNN Berry  [facility] [address]

## 2024-12-07 ENCOUNTER — PATIENT MESSAGE (OUTPATIENT)
Dept: FAMILY MEDICINE CLINIC | Facility: CLINIC | Age: 49
End: 2024-12-07
Payer: COMMERCIAL

## 2024-12-11 ENCOUNTER — HOSPITAL ENCOUNTER (OUTPATIENT)
Facility: HOSPITAL | Age: 49
Discharge: HOME | End: 2024-12-11
Admitting: STUDENT IN AN ORGANIZED HEALTH CARE EDUCATION/TRAINING PROGRAM
Payer: COMMERCIAL

## 2024-12-11 DIAGNOSIS — N18.31 STAGE 3A CHRONIC KIDNEY DISEASE: ICD-10-CM

## 2024-12-11 PROCEDURE — 76775 US EXAM ABDO BACK WALL LIM: CPT

## 2024-12-20 DIAGNOSIS — F41.1 GENERALIZED ANXIETY DISORDER: ICD-10-CM

## 2024-12-20 RX ORDER — DULOXETIN HYDROCHLORIDE 20 MG/1
CAPSULE, DELAYED RELEASE ORAL
Qty: 30 CAPSULE | Refills: 0 | Status: SHIPPED | OUTPATIENT
Start: 2024-12-20

## 2024-12-30 ENCOUNTER — OFFICE VISIT (OUTPATIENT)
Dept: FAMILY MEDICINE CLINIC | Facility: CLINIC | Age: 49
End: 2024-12-30
Payer: COMMERCIAL

## 2024-12-30 ENCOUNTER — LAB (OUTPATIENT)
Dept: LAB | Facility: HOSPITAL | Age: 49
End: 2024-12-30
Payer: COMMERCIAL

## 2024-12-30 VITALS
HEART RATE: 71 BPM | OXYGEN SATURATION: 96 % | WEIGHT: 219 LBS | DIASTOLIC BLOOD PRESSURE: 92 MMHG | SYSTOLIC BLOOD PRESSURE: 130 MMHG | BODY MASS INDEX: 34.37 KG/M2 | HEIGHT: 67 IN

## 2024-12-30 DIAGNOSIS — R42 DIZZY SPELLS: ICD-10-CM

## 2024-12-30 DIAGNOSIS — K21.9 GASTROESOPHAGEAL REFLUX DISEASE, UNSPECIFIED WHETHER ESOPHAGITIS PRESENT: ICD-10-CM

## 2024-12-30 DIAGNOSIS — N18.31 STAGE 3A CHRONIC KIDNEY DISEASE: ICD-10-CM

## 2024-12-30 DIAGNOSIS — R13.10 DYSPHAGIA, UNSPECIFIED TYPE: ICD-10-CM

## 2024-12-30 DIAGNOSIS — I10 PRIMARY HYPERTENSION: Primary | ICD-10-CM

## 2024-12-30 LAB
ANION GAP SERPL CALCULATED.3IONS-SCNC: 11 MMOL/L (ref 5–15)
BUN SERPL-MCNC: 16 MG/DL (ref 6–20)
BUN/CREAT SERPL: 15.8 (ref 7–25)
CALCIUM SPEC-SCNC: 9.7 MG/DL (ref 8.6–10.5)
CHLORIDE SERPL-SCNC: 107 MMOL/L (ref 98–107)
CO2 SERPL-SCNC: 25 MMOL/L (ref 22–29)
CREAT SERPL-MCNC: 1.01 MG/DL (ref 0.57–1)
DEPRECATED RDW RBC AUTO: 41.2 FL (ref 37–54)
EGFRCR SERPLBLD CKD-EPI 2021: 68.8 ML/MIN/1.73
ERYTHROCYTE [DISTWIDTH] IN BLOOD BY AUTOMATED COUNT: 12.8 % (ref 12.3–15.4)
GLUCOSE SERPL-MCNC: 71 MG/DL (ref 65–99)
HBA1C MFR BLD: 5.1 % (ref 4.8–5.6)
HCT VFR BLD AUTO: 41.2 % (ref 34–46.6)
HGB BLD-MCNC: 13.2 G/DL (ref 12–15.9)
MCH RBC QN AUTO: 28.1 PG (ref 26.6–33)
MCHC RBC AUTO-ENTMCNC: 32 G/DL (ref 31.5–35.7)
MCV RBC AUTO: 87.7 FL (ref 79–97)
PLATELET # BLD AUTO: 160 10*3/MM3 (ref 140–450)
PMV BLD AUTO: 10.9 FL (ref 6–12)
POTASSIUM SERPL-SCNC: 3.8 MMOL/L (ref 3.5–5.2)
RBC # BLD AUTO: 4.7 10*6/MM3 (ref 3.77–5.28)
SODIUM SERPL-SCNC: 143 MMOL/L (ref 136–145)
WBC NRBC COR # BLD AUTO: 7.92 10*3/MM3 (ref 3.4–10.8)

## 2024-12-30 PROCEDURE — 83036 HEMOGLOBIN GLYCOSYLATED A1C: CPT

## 2024-12-30 PROCEDURE — 80048 BASIC METABOLIC PNL TOTAL CA: CPT

## 2024-12-30 PROCEDURE — 85027 COMPLETE CBC AUTOMATED: CPT

## 2024-12-30 PROCEDURE — 99215 OFFICE O/P EST HI 40 MIN: CPT | Performed by: STUDENT IN AN ORGANIZED HEALTH CARE EDUCATION/TRAINING PROGRAM

## 2024-12-30 RX ORDER — LOSARTAN POTASSIUM 50 MG/1
50 TABLET ORAL DAILY
Qty: 90 TABLET | Refills: 3 | Status: SHIPPED | OUTPATIENT
Start: 2024-12-30

## 2024-12-30 NOTE — PROGRESS NOTES
Established Office Visit      Patient Name: Jeannette Aviles  : 1975   MRN: [id number]   Care Team: Patient Care Team:  Adelaide Mc DO as PCP - General (Internal Medicine)  Marialuisa Lu MD as Consulting Physician (Endocrinology)  Rony Goode MD as Consulting Physician (Cardiology)  Adelaide Mc DO as Referring Physician (Internal Medicine)    Chief Complaint:    Chief Complaint   Patient presents with    Dizziness       History of Present Illness: Jeannette Aviles is a 49 y.o. female with anxiety/depression, dermatitis, hypothyroidism, HTN, migraine without aura, chronic low back pain 2/2 lumbar DDD who is here today to follow up with dizziness.    Patient reports intermittent dizzy spells which have been going on >2 years. Over the past 1 months occuring more frequently and becoming more intense. Spells last several minutes and are accompanied dyspnea, plapitations. Almost always occur with standing and exerting self such as bending and lifting. Sometimes worse with head movement.  She established with cardiology for this earlier in the year - holter monitor normal, normal coronary CTA, coronary calcium score of 0, and normal echocardiogram.   She took a medical leave of absence due to dizzy spells after one episode caused her to completely lose balance during a shift at work. Reprots she does not feel safe with bending/lifting. Concerned for passing out/safety.     HTN - could not tolerate amlodipine due to to edema and facial flushing. She is taking losartan 50mg and most home BP readings are at goal <130/80. Todays reading is slightly elevated but reports this is an outlier.     Anxiety/depression - over the past 8 weeks she has been following recommendation of her behavioral health provider and cross titrating paxil - moving up to 30mg daily and weaning off cymbalta (previously on 80mg daily).    She has been focusing on kidney health lately - increasing hydration,  "eliminating unnecessary medications, eliminating soda.     GERD - well controlled with famotidine.   Continues to have intermittent dysphagia, with all types of food, even water at times. Stopped her PPI due to CKD but continued H2 blocker and symptoms are unchanged.     Subjective      Review of Systems:   Review of Systems - See HPI    I have reviewed and the following portions of the patient's history were updated as appropriate: past family history, past medical history, past social history, past surgical history and problem list.    Medications:     Current Outpatient Medications:     famotidine (PEPCID) 40 MG tablet, Take 1 tablet by mouth 2 (Two) Times a Day., Disp: 180 tablet, Rfl: 3    fexofenadine (ALLEGRA) 180 MG tablet, Take 1 tablet by mouth As Needed., Disp: , Rfl:     levothyroxine (SYNTHROID, LEVOTHROID) 125 MCG tablet, Take 1 tablet by mouth Daily., Disp: 90 tablet, Rfl: 3    losartan (COZAAR) 50 MG tablet, Take 1 tablet by mouth Daily., Disp: 90 tablet, Rfl: 3    PARoxetine (PAXIL) 30 MG tablet, Take 1 tablet by mouth Daily., Disp: 30 tablet, Rfl: 2    Vitamin E 180 MG (400 UNIT) capsule capsule, , Disp: , Rfl:     Allergies:   Allergies   Allergen Reactions    Aspartame Headache     migraines    Atorvastatin Myalgia    Buspirone Dizziness    Corticosteroids Hives and Swelling    Latex Hives       Objective     Physical Exam:  Vital Signs:   Vitals:    12/30/24 0956 12/30/24 1113 12/30/24 1114 12/30/24 1115   BP: 130/92      Pulse: 71      SpO2: 99% 94% 98% 96%   Weight: 99.3 kg (219 lb)      Height: 170.2 cm (67.01\")        Vitals:    12/30/24 1113 12/30/24 1114 12/30/24 1115   Orthostatic BP: 140/88 132/90 138/98   Orthostatic Pulse: 70 75 77       Body mass index is 34.29 kg/m².     Physical Exam  Vitals reviewed.   Constitutional:       Appearance: Normal appearance. She is not ill-appearing.   HENT:      Right Ear: Tympanic membrane, ear canal and external ear normal.      Left Ear: Tympanic " membrane, ear canal and external ear normal.   Cardiovascular:      Rate and Rhythm: Normal rate and regular rhythm.      Heart sounds: Normal heart sounds. No murmur heard.  Pulmonary:      Effort: Pulmonary effort is normal. No respiratory distress.      Breath sounds: Normal breath sounds. No wheezing.   Abdominal:      General: Abdomen is flat. There is no distension.      Palpations: Abdomen is soft.      Tenderness: There is no abdominal tenderness.   Skin:     General: Skin is warm and dry.   Neurological:      Mental Status: She is alert.   Psychiatric:         Mood and Affect: Mood normal.         Behavior: Behavior normal.         Judgment: Judgment normal.         Assessment / Plan      Assessment/Plan:   Problems Addressed This Visit  Diagnoses and all orders for this visit:    1. Primary hypertension (Primary)  -     losartan (COZAAR) 50 MG tablet; Take 1 tablet by mouth Daily.  Dispense: 90 tablet; Refill: 3    2. Stage 3a chronic kidney disease  -     Basic metabolic panel; Future    3. Dizzy spells  -     CBC No Differential; Future  -     Hemoglobin A1c; Future    4. Dysphagia, unspecified type  -     Ambulatory referral for Screening EGD    5. Gastroesophageal reflux disease, unspecified whether esophagitis present  -     Ambulatory referral for Screening EGD      Discussed recent episodes of dizzy spells may be related to several medication changes, most notably her behavioral health medications recently. Encouraged her to monitor symptoms now that her medications are stabilized and would expect improvement within 2 weeks. Reviewed complete cardiac workup earlier this year - holter monitor normal, normal coronary CTA, coronary calcium score of 0, and normal echocardiogram. Discussed importance of hydration. Goal 80oz-100oz/day.  Orthostatic vitals are negative.  Labs today  If symptoms continue, discussed MR Brain for further investigation.      HTN - have been able to decrease med burden, at this  point only taking losartan 50mg daily and home readings are well controlled    CKD3 - repeat BMP today now that she has minimized med list considerably. US 12/2024 normal. Has appointment to establish with nephrology this week.     Dysphagia, GERD - continue H2 blocker, referred for EGD      Plan of care reviewed with patient at the conclusion of today's visit. Education was provided regarding diagnosis and management.  Patient verbalizes understanding of and agreement with management plan.    Follow Up:   No follow-ups on file.    I spent 43 minutes caring for Jeannette on this date of service. This time includes time spent by me in the following activities:preparing for the visit, reviewing tests, obtaining and/or reviewing a separately obtained history, performing a medically appropriate examination and/or evaluation , counseling and educating the patient/family/caregiver, ordering medications, tests, or procedures, referring and communicating with other health care professionals , and documenting information in the medical record    DO RADHA Mackay RD  [facility]  [address]  [facility] [address]  Fax [phone]  Phone [phone]

## 2025-01-07 ENCOUNTER — TELEPHONE (OUTPATIENT)
Dept: FAMILY MEDICINE CLINIC | Facility: CLINIC | Age: 50
End: 2025-01-07
Payer: COMMERCIAL

## 2025-01-09 ENCOUNTER — TELEMEDICINE (OUTPATIENT)
Age: 50
End: 2025-01-09
Payer: COMMERCIAL

## 2025-01-09 DIAGNOSIS — F33.0 MILD EPISODE OF RECURRENT MAJOR DEPRESSIVE DISORDER: ICD-10-CM

## 2025-01-09 DIAGNOSIS — F41.1 GENERALIZED ANXIETY DISORDER: Primary | ICD-10-CM

## 2025-01-09 NOTE — PROGRESS NOTES
Video Visit      Patient Name: Jeannette Aviles  : 1975   MRN: [id number]     Referring Provider: Adelaide Mc DO    Chief Complaint:      ICD-10-CM ICD-9-CM   1. Generalized anxiety disorder  F41.1 300.02   2. Mild episode of recurrent major depressive disorder  F33.0 296.31        This provider is located at the [facility] (through [facility]), [address] [place] [phone] using a secure [employer] Video Visit through [employer]. Patient is being seen remotely via telehealth video visit at their home address in [place], and stated they are in a secure environment for this session. The patient's condition being diagnosed/treated is appropriate for telemedicine. The provider identified herself as well as her credentials. The patient, and/or patients guardian, consent to be seen remotely, and when consent is given they understand that the consent allows for patient identifiable information to be sent to a third party as needed. They may refuse to be seen remotely at any time. The electronic data is encrypted and password protected, and the patient and/or guardian has been advised of the potential risks to privacy not withstanding such measures.    The patient has chosen to receive care today through a telehealth video visit. Do you consent to use a video/audio connection for your medical care today? Yes    Mode of Visit: Video  Location of patient: -HOME-  Location of provider: +HOME+  You have chosen to receive care through a telehealth visit.  The patient has signed the video visit consent form.  The visit included audio and video interaction. No technical issues occurred during this visit.      History of Present Illness:   Jeannette Aviles is a 49 y.o. female who is being seen by a video visit today for follow up and medication management.     History of Present Illness      Subjective   Patient Reports:   Patient reports  "things are going mostly okay. Patient reports she is having increased dizzy spells from coming off of the Cymbalta because she was on it for an extended period of time. Patient reports having dizzy spells for the past 2 years.  Patient reports spells last several minutes and are accompanied dyspnea, chest tightness and palpitations. Patient reports symptoms most always occur with standing and exerting self such as bending and lifting. Patient reports being on medical leave until Monday. Patient report spells have decreased while on medical leave. Patient reports working from Oct- Dec.    Patient rates anxiety 5/10. Patient reports last panic attack was in Dec and states \"I was able to squash it before it occurred\". Patient denies mood swings, anger outburst and lashing out. Patient denies exaggerated sense of well being or self-confidence. Patient denies decreased need for sleep.Patient denies impulsive and risk-taking behaviors.    Patient rates depression 2/10. Patient reports increased feeling of emotions since discontinuing Cymbalta, but emotions are getting better. Patient denies SI/HI/AVH.    Patient reports she established care with cardiology earlier in the year - holter monitor normal, normal coronary CTA, coronary calcium score of 0, and normal echocardiogram. Patient reports recently seeing nephrologist and eGFR went back up to within normal limits. Provider reviewed results. Patient reports provider suggested cause of decreased level was related to previous medication regimen.    PHQ-9= will assess at follow up visit   CASSANDRA-7= will assess at follow up visit     Review of Systems:   Review of Systems   Constitutional:  Negative for appetite change.   Respiratory:  Positive for shortness of breath. Negative for chest tightness.    Cardiovascular:  Positive for chest pain and palpitations.   Gastrointestinal:  Positive for nausea. Negative for abdominal pain.   Musculoskeletal:  Negative for back pain. " "  Neurological:  Positive for dizziness.   Psychiatric/Behavioral:  Positive for dysphoric mood. Negative for agitation, behavioral problems, confusion, decreased concentration, hallucinations, self-injury, sleep disturbance and suicidal ideas. The patient is nervous/anxious.    Sleep pattern: 8-10 hours per nights  Appetite: decreased to normal \" I am remembering to eat but I don't have the urge to eat\"       RISK ASSESSMENT:  Patient denies any thoughts of suicide or intent today. Patient denies any suicidal or homicidal ideation today. Patient denies any high risk factors today.     Medications:     Current Outpatient Medications:     famotidine (PEPCID) 40 MG tablet, Take 1 tablet by mouth 2 (Two) Times a Day., Disp: 180 tablet, Rfl: 3    fexofenadine (ALLEGRA) 180 MG tablet, Take 1 tablet by mouth As Needed., Disp: , Rfl:     levothyroxine (SYNTHROID, LEVOTHROID) 125 MCG tablet, Take 1 tablet by mouth Daily., Disp: 90 tablet, Rfl: 3    losartan (COZAAR) 50 MG tablet, Take 1 tablet by mouth Daily., Disp: 90 tablet, Rfl: 3    PARoxetine (PAXIL) 30 MG tablet, Take 1 tablet by mouth Daily., Disp: 30 tablet, Rfl: 2    Vitamin E 180 MG (400 UNIT) capsule capsule, , Disp: , Rfl:     Medication Considerations:  ROB reviewed and appropriate.      Allergies:   Allergies   Allergen Reactions    Aspartame Headache     migraines    Atorvastatin Myalgia    Buspirone Dizziness    Corticosteroids Hives and Swelling    Latex Hives       Objective     Physical Exam:  Vital Signs: There were no vitals filed for this visit.  There is no height or weight on file to calculate BMI.     PCP visit on 12/30 12/30/24 0956 12/30/24 1113 12/30/24 1114 12/30/24 1115   BP: 130/92         Pulse: 71         SpO2: 99% 94% 98% 96%   Weight: 99.3 kg (219 lb)         Height: 170.2 cm (67.01\")               Vitals[]Expand by Default         Vitals:     12/30/24 1113 12/30/24 1114 12/30/24 1115   Orthostatic BP: 140/88 132/90 138/98 "   Orthostatic Pulse: 70 75 77            Body mass index is 34.29 kg/m².     Patient reports she is currently monitoring BP due to BP medication changes    Mental Status Exam:   Hygiene:   good  Cooperation:  Cooperative  Eye Contact:  Good  Psychomotor Behavior:  Appropriate  Affect:  Full range and Appropriate  Mood: depressed and anxious  Speech:  Normal  Thought Process:  Goal directed and Linear  Thought Content:  Normal  Suicidal:  None  Homicidal:  None  Hallucinations:  None  Delusion:  None  Memory:  Intact  Orientation:  Person, Place, Time, and Situation  Reliability:  fair  Insight:  Fair  Judgement:  Fair  Impulse Control:  Fair  Physical/Medical Issues:  No      @RESULASTCBCDIFFPANEL,TSH,LABLIPI,[id number],KGXDTVOI89,MG,FOLATE,PROLACTIN,CRPRESULT,CMP,[id number])@    Lab Results   Component Value Date    GLUCOSE 71 12/30/2024    BUN 16 12/30/2024    CREATININE 1.01 (H) 12/30/2024    EGFR 68.8 12/30/2024    BCR 15.8 12/30/2024    K 3.8 12/30/2024    CO2 25.0 12/30/2024    CALCIUM 9.7 12/30/2024    ALBUMIN 4.4 10/01/2024    BILITOT 0.5 10/01/2024    AST 17 10/01/2024    ALT 13 10/01/2024       Lab Results   Component Value Date    WBC 7.92 12/30/2024    HGB 13.2 12/30/2024    HCT 41.2 12/30/2024    MCV 87.7 12/30/2024     12/30/2024       Lab Results   Component Value Date    CHOL 249 (H) 10/01/2024    TRIG 97 10/01/2024    HDL 41 10/01/2024     (H) 10/01/2024       Assessment / Plan      Visit Diagnosis/Orders Placed This Visit:  Diagnoses and all orders for this visit:    1. Generalized anxiety disorder (Primary)  -     Ambulatory Referral to Behavioral Health    2. Mild episode of recurrent major depressive disorder  -     Ambulatory Referral to Behavioral Health         Assessment & Plan        Functional Status: Mild impairment     Prognosis: Good with Ongoing Treatment     Impression/Formulation:  Patient appeared alert and oriented.  Patient is voluntarily requesting to continue  outpatient psychiatric treatment at [facility].  Patient is receptive to assistance with maintaining a stable lifestyle.  Patient presents with history of     ICD-10-CM ICD-9-CM   1. Generalized anxiety disorder  F41.1 300.02   2. Mild episode of recurrent major depressive disorder  F33.0 296.31   . Reviewed patient's previous provider notes. Reviewed most recent labs. Patient meets DSM V diagnostic criteria for diagnoses. Diagnoses may be updated as more information becomes available.     Treatment Plan:   Continue Paxil 30mg PO qd. No refills needed  Referral placed for Autism Testing  Encouraged psychotherapy  Follow up in 6 weeks and as needed    Patient will continue supportive psychotherapy efforts and medications as indicated. Clinic will obtain release of information for current treatment team for continuity of care as needed. Patient will contact this office, call 911 or present to the nearest emergency room should suicidal or homicidal ideations occur. Discussed medication options and treatment plan of prescribed medication(s) as well as the risks, benefits, and potential side effects. Patient ackowledged and verbally consented to continue with current treatment plan and was educated on the importance of compliance with treatment and follow-up appointments.     Medication risks and side effects discussed with patient including risk for worsening mood, changes in behavior, thoughts of suicide or homicide, induction of fide, serotonin syndrome, rare hyponatremia, rare SIADH, withdrawal symptoms if abrupt withdrawal, sexual dysfunction.   If any thoughts of SI or HI, worsening mood or changes in behavior, call 911 or crisis line [phone], or go to nearest ER at once. Patient agrees to notify close family/friend of new trial of antidepressants/info above. Pt.verbalizes understanding and consents to treatment with this medication.       Quality Measures:   Never smoker    I advised  Jeannette Aviles of the risks of tobacco use.       Follow Up:   Return in about 6 weeks (around 2/20/2025).      LYNN Berry, PMHNP-[facility] [address]     Answers submitted by the patient for this visit:  Primary Reason for Visit (Submitted on 1/8/2025)  What is the primary reason for your visit?: Anxiety  Anxiety (Submitted on 1/8/2025)  Chief Complaint: Anxiety  Visit: follow-up  Frequency: most days  Severity: moderate  depressed mood: No  dry mouth: No  excessive worry: No  insomnia: No  irritability: No  malaise/fatigue: Yes  obsessions: No  Sleep quality: good  Hours of sleep per night: 10 Hours  Aggravated by: work stress  Medication compliance: %

## 2025-01-19 DIAGNOSIS — F33.0 MILD EPISODE OF RECURRENT MAJOR DEPRESSIVE DISORDER: ICD-10-CM

## 2025-01-19 DIAGNOSIS — E06.3 HYPOTHYROIDISM DUE TO HASHIMOTO'S THYROIDITIS: ICD-10-CM

## 2025-01-19 DIAGNOSIS — F41.1 GENERALIZED ANXIETY DISORDER: ICD-10-CM

## 2025-01-20 RX ORDER — PAROXETINE 30 MG/1
30 TABLET, FILM COATED ORAL DAILY
Qty: 30 TABLET | Refills: 1 | Status: SHIPPED | OUTPATIENT
Start: 2025-01-20 | End: 2026-01-20

## 2025-01-20 RX ORDER — LEVOTHYROXINE SODIUM 125 UG/1
125 TABLET ORAL DAILY
Qty: 90 TABLET | Refills: 2 | Status: SHIPPED | OUTPATIENT
Start: 2025-01-20

## 2025-01-23 ENCOUNTER — OFFICE VISIT (OUTPATIENT)
Dept: CARDIOLOGY | Facility: CLINIC | Age: 50
End: 2025-01-23
Payer: COMMERCIAL

## 2025-01-23 VITALS
OXYGEN SATURATION: 97 % | HEART RATE: 71 BPM | DIASTOLIC BLOOD PRESSURE: 88 MMHG | HEIGHT: 67 IN | SYSTOLIC BLOOD PRESSURE: 138 MMHG | BODY MASS INDEX: 34.91 KG/M2 | WEIGHT: 222.4 LBS

## 2025-01-23 DIAGNOSIS — E78.2 MIXED HYPERLIPIDEMIA: ICD-10-CM

## 2025-01-23 DIAGNOSIS — I10 HYPERTENSION, ESSENTIAL: Primary | ICD-10-CM

## 2025-01-23 DIAGNOSIS — R42 ORTHOSTATIC DIZZINESS: Primary | ICD-10-CM

## 2025-01-23 DIAGNOSIS — R42 ORTHOSTATIC DIZZINESS: ICD-10-CM

## 2025-01-23 DIAGNOSIS — R07.2 PRECORDIAL CHEST PAIN: ICD-10-CM

## 2025-01-23 PROCEDURE — 99214 OFFICE O/P EST MOD 30 MIN: CPT | Performed by: INTERNAL MEDICINE

## 2025-01-23 RX ORDER — VITAMIN K2 90 MCG
15 CAPSULE ORAL
COMMUNITY
Start: 2024-11-30

## 2025-01-23 NOTE — PROGRESS NOTES
OFFICE VISIT  NOTE  [facility] CARDIOLOGY      Name: Jeannette Aviles    Date: 2025  MRN:  [id number]  :  1975      REFERRING/PRIMARY PROVIDER:  Adelaide Mc DO    Chief Complaint   Patient presents with    PAIGE (dyspnea on exertion)     Patient states she has had an increase in dizziness.        HPI: Jeannette Aviles is a 49 y.o. female who presents for chest pain and shortness of breath.  I take care of her mother Ingrid Laguna.  She has a strong family history of premature CAD both mother and father.  She has a longstanding history of hyperlipidemia as well as hypertension.  CCTA 2024 was normal, no atherosclerotic plaque, coronary calcium score 0.  Normal echo 2024, normal Holter 2024.  Complaint today is orthostatic dizziness, happens when she is working at [employer] she bends down to  a tote and carry it to the neck station, she has to stop and gather herself.  No chest pain or shortness of breath currently.      Past Medical History:   Diagnosis Date    Abnormal ECG 2024    at [facility]/ Dr Mc    Allergic 2019    latex, topical corticosteroids, sugar substitutes    Anemia     resolved    Anxiety     Arthritis     lumbar spine, right big toe/ hallux rigidus    Asthma 1980s    resolved    Chronic kidney disease     Stage 3A diagnosed 10/2024    Chronic pain disorder     Clotting disorder 1987    heavy bleeding during menses    Colon polyp  05 04    three removed during colonoscopy    Depression     Glaucoma     eye pressure; tracking with opthalmologist after high eye pressure results    Headache 1994    History of medical problems , 2018    PMDD, pelvic floor weakness    Hyperlipidemia 2000    Hypertension 2020    Hypothyroidism 2015    Hashimoto's Disease    Low back pain 2001    Neuromuscular disorder 2020    cubital and carpal tunnel symptoms    Obesity 2001    Urinary tract infection 1990    nothing chronic, but i  have had them    Visual impairment     crystals around optic nerve, tracking with opthalmologist       Past Surgical History:   Procedure Laterality Date    COLONOSCOPY  2023 May 4th    three pre-cancerous polyps removed       Social History     Socioeconomic History    Marital status:    Tobacco Use    Smoking status: Never    Smokeless tobacco: Never    Tobacco comments:     Strongly anti-smoker   Vaping Use    Vaping status: Never Used    Passive vaping exposure: Yes   Substance and Sexual Activity    Alcohol use: Not Currently     Comment: maybe 5 times a year, not much to speak of really    Drug use: Never    Sexual activity: Yes     Partners: Male     Birth control/protection: Post-menopausal     Comment: secondary infertility, miscarriage       Family History   Problem Relation Age of Onset    Arthritis Mother         RA and Lupus/SLE    Depression Mother         history of, not at present    Thyroid disease Mother         hypothyroidism    Lupus Mother     COPD Mother         emphysema was mentioned on her last lung CT     Hyperlipidemia Mother     Vision loss Mother         cataracts    Hypertension Father     Anxiety disorder Father     Heart disease Father         heart attack at 69yo    Hyperlipidemia Father         since his 20s    Stroke Father         several minor strokes in his 60s    Thyroid disease Father         hyperthyroidism    Heart attack Father         at 69yo,  within 6 weeks    Kidney disease Father         I don't think we knew it until after the heart attack    Post-traumatic stress disorder Brother     Alcohol abuse Brother     Drug abuse Brother     Cancer Maternal Aunt         i think skin cancer? recent dx    Thyroid disease Maternal Aunt         hypothyroidism, recently dx Hashimoto's    Skin cancer Maternal Aunt     Early death Maternal Uncle         car accident at 18- I know, not a health issue, but you asked.    Heart attack Paternal Aunt         2nd h/a at  69 yo; fatal    Diabetes Paternal Aunt     Heart disease Paternal Aunt         fatal heart attack at 71yo, had had previous (I think a stent?)    Thyroid disease Paternal Aunt         hypo, I think    COPD Maternal Grandmother         lifelong smoker    Cancer Maternal Grandfather         lifelong smoker, lung cancer    Lung cancer Maternal Grandfather     Breast cancer Paternal Grandmother     Cancer Paternal Grandmother         breast cancer- her whole family had cancer of some type    Early death Paternal Grandmother         breast cancer at 58 years old    Hypertension Paternal Grandfather     Hyperlipidemia Paternal Grandfather     Liver disease Paternal Grandfather          at 68    Stroke Paternal Grandfather         sever impairment afterwards, had to live in care facility    Hyperlipidemia Son     Anxiety disorder Son         also autistic    Depression Son     Developmental Disability Son         autism, no intellectual or verbal delays    Liver disease Son         NAFLD/ BECK at 20 years old    Mental illness Son         are we counting autism here?    Autism Son     Learning disabilities Son         ADHD diagnosis in     Alcohol abuse Brother         currently using naltrexone    Anxiety disorder Brother     Drug abuse Brother         previously an issue couple decades ago    Ovarian cancer Neg Hx         ROS:   Constitutional no fever,  no weight loss   Skin no rash, no subcutaneous nodules   Otolaryngeal no difficulty swallowing   Cardiovascular See HPI   Pulmonary no cough, no sputum production   Gastrointestinal no constipation, no diarrhea   Genitourinary no dysuria, no hematuria   Hematologic no easy bruisability, no abnormal bleeding   Musculoskeletal no muscle pain   Neurologic no dizziness, no falls         Allergies   Allergen Reactions    Amlodipine Swelling    Aspartame Headache     migraines    Atorvastatin Myalgia    Buspirone Dizziness    Corticosteroids Hives and Swelling    Latex  "Hives         Current Outpatient Medications:     famotidine (PEPCID) 40 MG tablet, Take 1 tablet by mouth 2 (Two) Times a Day., Disp: 180 tablet, Rfl: 3    fexofenadine (ALLEGRA) 180 MG tablet, Take 1 tablet by mouth As Needed., Disp: , Rfl:     Levomefolate Glucosamine (Methyl-Folate) 1700 MCG capsule, 15 mg., Disp: , Rfl:     levothyroxine (SYNTHROID, LEVOTHROID) 125 MCG tablet, Take 1 tablet by mouth daily., Disp: 90 tablet, Rfl: 2    losartan (COZAAR) 50 MG tablet, Take 1 tablet by mouth Daily., Disp: 90 tablet, Rfl: 3    PARoxetine (PAXIL) 30 MG tablet, Take 1 tablet by mouth daily., Disp: 30 tablet, Rfl: 1    Vitamin E 180 MG (400 UNIT) capsule capsule, , Disp: , Rfl:     Vitals:    01/23/25 1009   BP: 138/88   BP Location: Left arm   Patient Position: Sitting   Cuff Size: Adult   Pulse: 71   SpO2: 97%   Weight: 101 kg (222 lb 6.4 oz)   Height: 170.2 cm (67\")       Body mass index is 34.83 kg/m².    PHYSICAL EXAM:    General Appearance:   · well developed  · well nourished  Neck:  · thyroid not enlarged  · supple  Respiratory:  · no respiratory distress  · normal breath sounds  · no rales  Cardiovascular:  · no jugular venous distention  · regular rhythm  · apical impulse normal  · S1 normal, S2 normal  · no S3, no S4   · no murmur  · no rub, no thrill  · carotid pulses normal; no bruit  · pedal pulses normal  · lower extremity edema: none    Skin:   warm, dry      RESULTS:   Procedures    Results for orders placed during the hospital encounter of 04/19/24    Adult Transthoracic Echo Complete w/ Color, Spectral and Contrast if necessary per protocol    Interpretation Summary    Left ventricular systolic function is normal. Left ventricular ejection fraction appears to be 61 - 65%.    Left ventricular diastolic function was normal.    Estimated right ventricular systolic pressure from tricuspid regurgitation is normal (<35 mmHg).        Labs:  Lab Results   Component Value Date    CHOL 249 (H) 10/01/2024    " "TRIG 97 10/01/2024    HDL 41 10/01/2024     (H) 10/01/2024    AST 17 10/01/2024    ALT 13 10/01/2024     Lab Results   Component Value Date    HGBA1C 5.10 12/30/2024     No components found for: \"CREATINININE\"  No results found for: \"EGFRIFNONA\"    Most recent PCP note, imaging tests, and labs reviewed.    ASSESSMENT:  Problem List Items Addressed This Visit       Precordial chest pain    Hypertension, essential - Primary    Mixed hyperlipidemia    Orthostatic dizziness       PLAN:    1.  Shortness of breath with chest pain:  Normal coronary CTA and echocardiogram    Advised patient to call 911 if any further symptoms occur    2.  Hyperlipidemia:  Xanthelasma present on eyelids on exam  Patient declined statin therapy    3.  Hypertension:  Goal blood pressure less than 130/80  Continue current medical therapy well-controlled currently.    4.  Sinus tachycardia:  Stable, no beta-blocker necessary due to orthostatic symptoms.  Rate 71 today    5.  Orthostatic dizziness:  Discussed importance of continued adequate hydration she drinks about 64 to 90 ounces of water or fluids per day.  Discussed taking her time as well as compression stockings which she wears at work.    .She requests a tilt table test which we will order today.    Return to clinic in 12 months, or sooner as needed.    Thank you for the opportunity to share in the care of your patient; please do not hesitate to call me with any questions.     Rony Goode MD, [facility], [facility]  Office: [phone]  [facility] [place] [address]    01/23/25    "

## 2025-01-30 ENCOUNTER — OFFICE VISIT (OUTPATIENT)
Dept: FAMILY MEDICINE CLINIC | Facility: CLINIC | Age: 50
End: 2025-01-30
Payer: COMMERCIAL

## 2025-01-30 VITALS
DIASTOLIC BLOOD PRESSURE: 90 MMHG | HEIGHT: 67 IN | OXYGEN SATURATION: 95 % | HEART RATE: 78 BPM | SYSTOLIC BLOOD PRESSURE: 134 MMHG | WEIGHT: 214.2 LBS | BODY MASS INDEX: 33.62 KG/M2

## 2025-01-30 DIAGNOSIS — I10 PRIMARY HYPERTENSION: ICD-10-CM

## 2025-01-30 DIAGNOSIS — E06.3 HYPOTHYROIDISM DUE TO HASHIMOTO'S THYROIDITIS: ICD-10-CM

## 2025-01-30 DIAGNOSIS — F41.9 ANXIETY: ICD-10-CM

## 2025-01-30 DIAGNOSIS — K21.9 GASTROESOPHAGEAL REFLUX DISEASE, UNSPECIFIED WHETHER ESOPHAGITIS PRESENT: ICD-10-CM

## 2025-01-30 DIAGNOSIS — N18.2 STAGE 2 CHRONIC KIDNEY DISEASE: ICD-10-CM

## 2025-01-30 DIAGNOSIS — Z00.00 ANNUAL PHYSICAL EXAM: Primary | ICD-10-CM

## 2025-01-30 DIAGNOSIS — Z12.31 ENCOUNTER FOR SCREENING MAMMOGRAM FOR MALIGNANT NEOPLASM OF BREAST: ICD-10-CM

## 2025-01-30 DIAGNOSIS — E78.01 FAMILIAL HYPERCHOLESTEROLEMIA: ICD-10-CM

## 2025-01-30 DIAGNOSIS — Z23 IMMUNIZATION DUE: ICD-10-CM

## 2025-01-30 PROCEDURE — 90656 IIV3 VACC NO PRSV 0.5 ML IM: CPT | Performed by: STUDENT IN AN ORGANIZED HEALTH CARE EDUCATION/TRAINING PROGRAM

## 2025-01-30 PROCEDURE — 99214 OFFICE O/P EST MOD 30 MIN: CPT | Performed by: STUDENT IN AN ORGANIZED HEALTH CARE EDUCATION/TRAINING PROGRAM

## 2025-01-30 PROCEDURE — 99396 PREV VISIT EST AGE 40-64: CPT | Performed by: STUDENT IN AN ORGANIZED HEALTH CARE EDUCATION/TRAINING PROGRAM

## 2025-01-30 PROCEDURE — 90471 IMMUNIZATION ADMIN: CPT | Performed by: STUDENT IN AN ORGANIZED HEALTH CARE EDUCATION/TRAINING PROGRAM

## 2025-01-30 RX ORDER — EZETIMIBE 10 MG/1
10 TABLET ORAL DAILY
Qty: 30 TABLET | Refills: 5 | Status: SHIPPED | OUTPATIENT
Start: 2025-01-30

## 2025-01-30 RX ORDER — DULOXETIN HYDROCHLORIDE 20 MG/1
20 CAPSULE, DELAYED RELEASE ORAL DAILY
Qty: 30 CAPSULE | Refills: 2 | Status: SHIPPED | OUTPATIENT
Start: 2025-01-30

## 2025-01-30 NOTE — PROGRESS NOTES
Physical Exam     Patient Name: Jeannette Aviles  : 1975   MRN: [id number]   Care Team: Patient Care Team:  Adelaide Mc DO as PCP - General (Internal Medicine)  Marialuisa Lu MD as Consulting Physician (Endocrinology)  Rony Goode MD as Consulting Physician (Cardiology)  Adelaide Mc DO as Referring Physician (Internal Medicine)  Mona Banegas PA-C as Physician Assistant (Cardiology)    Chief Complaint:    Chief Complaint   Patient presents with    Annual Exam       History of Present Illness: Jeannette Aviles is a 50 y.o. female with anxiety/depression, dermatitis, hypothyroidism, HTN, migraine without aura, chronic low back pain 2/2 lumbar DDD who is presents today for annual healthcare maintenance.       Health Maintenance   Topic Date Due    COVID-19 Vaccine (2024- season) 2024    LIPID PANEL  10/01/2025    BMI FOLLOWUP  2025    ANNUAL PHYSICAL  2026    COLORECTAL CANCER SCREENING  2026    MAMMOGRAM  2026    PAP SMEAR  2027    TDAP/TD VACCINES (2 - Td or Tdap) 2033    HEPATITIS C SCREENING  Completed    INFLUENZA VACCINE  Completed    Pneumococcal Vaccine 0-64  Aged Out       Subjective      Review of Systems:   Review of Systems - See HPI    Past Medical History:   Past Medical History:   Diagnosis Date    Abnormal ECG 2024    at [facility]/ Dr Mc    Allergic 2019    latex, topical corticosteroids, sugar substitutes    Anemia     resolved    Anxiety     Arthritis     lumbar spine, right big toe/ hallux rigidus    Asthma 1980s    resolved    Chronic kidney disease     Stage 3A diagnosed 10/2024    Chronic pain disorder     Clotting disorder 1987    heavy bleeding during menses    Colon polyp  05 04    three removed during colonoscopy    Depression     GERD (gastroesophageal reflux disease)     increased heartburn since  fall    Glaucoma     eye pressure; tracking with  opthalmologist after high eye pressure results    Headache 1994    History of medical problems ,     PMDD, pelvic floor weakness    Hyperlipidemia 2000    Hypertension     Hypothyroidism     Hashimoto's Disease    Liver disease     fatty deposits seen on CT     Low back pain     Neuromuscular disorder     cubital and carpal tunnel symptoms    Obesity 2001    Urinary tract infection     nothing chronic, but i have had them    Visual impairment     crystals around optic nerve, tracking with opthalmologist       Past Surgical History:   Past Surgical History:   Procedure Laterality Date    COLONOSCOPY  2023 May 4th    three pre-cancerous polyps removed       Family History:   Family History   Problem Relation Age of Onset    Arthritis Mother         RA and Lupus/SLE    Depression Mother         history of, not at present    Thyroid disease Mother         hypothyroidism    Lupus Mother     COPD Mother         emphysema was mentioned on her last lung CT     Hyperlipidemia Mother     Vision loss Mother         cataracts    Hypertension Father     Anxiety disorder Father     Heart disease Father         heart attack at 71yo    Hyperlipidemia Father         since his 20s    Stroke Father         several minor strokes in his 60s    Thyroid disease Father         hyperthyroidism    Heart attack Father         at 71yo,  within 6 weeks    Kidney disease Father         I don't think we knew it until after the heart attack    Post-traumatic stress disorder Brother     Alcohol abuse Brother     Drug abuse Brother     Cancer Maternal Aunt         i think skin cancer? recent dx    Thyroid disease Maternal Aunt         hypothyroidism, recently dx Hashimoto's    Skin cancer Maternal Aunt     Early death Maternal Uncle         car accident at 18- I know, not a health issue, but you asked.    Heart attack Paternal Aunt         2nd h/a at 69 yo; fatal    Diabetes Paternal Aunt     Heart  disease Paternal Aunt         fatal heart attack at 71yo, had had previous (I think a stent?)    Thyroid disease Paternal Aunt         hypo, I think    COPD Maternal Grandmother         lifelong smoker    Cancer Maternal Grandfather         lifelong smoker, lung cancer    Lung cancer Maternal Grandfather     Breast cancer Paternal Grandmother     Cancer Paternal Grandmother         breast cancer- her whole family had cancer of some type    Early death Paternal Grandmother         breast cancer at 58 years old    Hypertension Paternal Grandfather     Hyperlipidemia Paternal Grandfather     Liver disease Paternal Grandfather          at 68    Stroke Paternal Grandfather         sever impairment afterwards, had to live in care facility    Hyperlipidemia Son     Anxiety disorder Son         also autistic    Depression Son     Developmental Disability Son         autism, no intellectual or verbal delays    Liver disease Son         NAFLD/ BECK at 20 years old    Mental illness Son         are we counting autism here?    Autism Son     Learning disabilities Son         ADHD diagnosis in     Alcohol abuse Brother         currently using naltrexone    Anxiety disorder Brother     Drug abuse Brother         previously an issue couple decades ago    Ovarian cancer Neg Hx        Social History:   Social History     Socioeconomic History    Marital status:    Tobacco Use    Smoking status: Never    Smokeless tobacco: Never    Tobacco comments:     Strongly anti-smoker   Vaping Use    Vaping status: Never Used    Passive vaping exposure: Yes   Substance and Sexual Activity    Alcohol use: Not Currently     Comment: maybe 5 times a year, not much to speak of really    Drug use: Never    Sexual activity: Yes     Partners: Male     Birth control/protection: Post-menopausal     Comment: secondary infertility, miscarriage       Tobacco History:   Social History     Tobacco Use   Smoking Status Never   Smokeless Tobacco  "Never   Tobacco Comments    Strongly anti-smoker       Medications:     Current Outpatient Medications:     famotidine (PEPCID) 40 MG tablet, Take 1 tablet by mouth 2 (Two) Times a Day., Disp: 180 tablet, Rfl: 3    fexofenadine (ALLEGRA) 180 MG tablet, Take 1 tablet by mouth As Needed., Disp: , Rfl:     Levomefolate Glucosamine (Methyl-Folate) 1700 MCG capsule, 15 mg., Disp: , Rfl:     levothyroxine (SYNTHROID, LEVOTHROID) 125 MCG tablet, Take 1 tablet by mouth daily., Disp: 90 tablet, Rfl: 2    losartan (COZAAR) 50 MG tablet, Take 1 tablet by mouth Daily., Disp: 90 tablet, Rfl: 3    Vitamin E 180 MG (400 UNIT) capsule capsule, , Disp: , Rfl:     DULoxetine (Cymbalta) 20 MG capsule, Take 1 capsule by mouth Daily., Disp: 30 capsule, Rfl: 2    ezetimibe (Zetia) 10 MG tablet, Take 1 tablet by mouth Daily., Disp: 30 tablet, Rfl: 5    Allergies:   Allergies   Allergen Reactions    Amlodipine Swelling    Aspartame Headache     migraines    Atorvastatin Myalgia    Buspirone Dizziness    Corticosteroids Hives and Swelling    Latex Hives       Past Medical History, Social History, Family History and Care Team were all reviewed with patient and updated as appropriate.       Objective     Physical Exam:  Vital Signs:   Vitals:    01/30/25 1224   BP: 134/90   Pulse: 78   SpO2: 95%   Weight: 97.2 kg (214 lb 3.2 oz)   Height: 170.2 cm (67\")     Body mass index is 33.55 kg/m².     Physical Exam  Vitals reviewed.   Constitutional:       Appearance: Normal appearance. She is not ill-appearing.   Cardiovascular:      Rate and Rhythm: Normal rate and regular rhythm.      Heart sounds: Normal heart sounds. No murmur heard.  Pulmonary:      Effort: Pulmonary effort is normal. No respiratory distress.      Breath sounds: Normal breath sounds. No wheezing.   Abdominal:      General: Abdomen is flat. There is no distension.      Palpations: Abdomen is soft.      Tenderness: There is no abdominal tenderness.   Skin:     General: Skin is " warm and dry.   Neurological:      Mental Status: She is alert.   Psychiatric:         Mood and Affect: Mood normal.         Behavior: Behavior normal.         Judgment: Judgment normal.         Assessment / Plan      Assessment/Plan:   Problems Addressed This Visit  Diagnoses and all orders for this visit:    1. Annual physical exam (Primary)    2. Hypothyroidism due to Hashimoto's thyroiditis  -     TSH Rfx On Abnormal To Free T4; Future    3. Primary hypertension    4. Stage 2 chronic kidney disease  -     Comprehensive metabolic panel; Future    5. Gastroesophageal reflux disease, unspecified whether esophagitis present    6. Encounter for screening mammogram for malignant neoplasm of breast  -     Mammo Screening Digital Tomosynthesis Bilateral With CAD; Future    7. Familial hypercholesterolemia  -     ezetimibe (Zetia) 10 MG tablet; Take 1 tablet by mouth Daily.  Dispense: 30 tablet; Refill: 5  -     Lipid Panel w/ Chol/HDL Ratio; Future  -     Comprehensive metabolic panel; Future    8. Immunization due  -     Fluzone >6mos ([id number])    9. Anxiety  -     DULoxetine (Cymbalta) 20 MG capsule; Take 1 capsule by mouth Daily.  Dispense: 30 capsule; Refill: 2        Healthcare Maintenance   Vaccines:  Influenza - given today   Covid - encouraged booster at pharmacy     Cancer Screening  -Mammogram 7/2024 BIRADS2, repeat annually   -Colon cancer screening 5/2023- repeat in 2026   -Pap smear 1/2024 - normal cytology, HPV negative    Other  -PHQ score 0  -Counseled on importance of maintaining healthy diet and routine exercise. Encouraged 150 min exercise per week.  -Postmenopausal   -Tobacco cessation: not indicated, nonsmoker  -Metabolic screening UTD    Encouraged routine eye and dental exams.     HTN - slightly elevated today. Goal <130/80. Advised monitoring at home. Goal <130/80. Agreeable to increasing losartan from 50mg to 100mg if home BP remains elevated. Will let me know. Will want to keep close eye on  renal function for all medication changes.   CKD2 - following with nephrology, improvement with medication adjustment and avoiding nephrotoxic agents. Continue hydration. Monitor renal function in 2-3 months with ongoing med changes.   HLD - uncontrolled. LDL >190 10/2024. Has tried rosuvastatin and atorvastatin but experienced intolerable myalgias with these. We will start Zetia 10mg daily and repeat lipid panel in 2-3 months   GERD - continue H2 blocker   Hypothyroidism - well controlled with synthroid, TSH normal 10/2024  Anxiety/depression - uncontrolled, does not feel Paxil is helpful whatsoever and would like to transition back to cymbalta. Will send in Cymbalta 20mg daily. She will follow up with her behavioral health provider in about 3 weeks for ongoing management.       Plan of care reviewed with patient at the conclusion of today's visit. Education was provided regarding diagnosis and management.  Patient verbalizes understanding of and agreement with management plan.    Follow Up:   Return in about 2 months (around 3/30/2025) for Recheck, Video visit.        DO RADHA Mackay RD  [facility]  [address]  [facility] [address]  Fax [phone]  Phone [phone]

## 2025-02-04 ENCOUNTER — OFFICE VISIT (OUTPATIENT)
Age: 50
End: 2025-02-04
Payer: COMMERCIAL

## 2025-02-04 DIAGNOSIS — F41.1 GENERALIZED ANXIETY DISORDER: Primary | ICD-10-CM

## 2025-02-04 DIAGNOSIS — F33.0 MILD EPISODE OF RECURRENT MAJOR DEPRESSIVE DISORDER: ICD-10-CM

## 2025-02-04 NOTE — PROGRESS NOTES
"     Initial Adult Testing Note      Date:2025   Client Name: Jeannette Aviles  : 1975   MRN: [id number]   Time In: 10:30 AM    Time Out: 11:12 AM     Referring Provider:   Patient Care Team:  Adelaide Mc DO as PCP - General (Internal Medicine)  Marialuisa Lu MD as Consulting Physician (Endocrinology)  Rony Goode MD as Consulting Physician (Cardiology)  Adelaide Mc DO as Referring Physician (Internal Medicine)  Mona Banegas PA-C as Physician Assistant (Cardiology)     Chief Complaint:      ICD-10-CM ICD-9-CM   1. Generalized anxiety disorder  F41.1 300.02   2. Mild episode of recurrent major depressive disorder  F33.0 296.31        History of Present Illness:   Jeannette Aviles is a 50 y.o. female who is seeking consultation for Attention Deficit/Hyperactivity Disorder  (ADHD) and Autism Spectrum Disorder (ASD).She suspects she may have undiagnosed ADHD and autism, conditions that her child has been diagnosed with. She recalls an incident during her child's diagnostic process where she was questioned about eye contact, a concept she was unfamiliar with. She has a long-standing history of anxiety, dating back to her early childhood, and struggles with emotional regulation. She describes a need for things to be in a certain way, a trait she has had since childhood. She experiences social anxiety, which she manages through over-preparation, a process she finds exhausting. She has a history of obsessive-compulsive disorder (OCD) but does not believe it interferes with her life. She has specific rules about food and texture issues, such as an aversion to ketchup and a preference for certain types of grapes. She used to chew paper, which she believes was for sensory input.    Past Psychiatric History:   Ms. Aviles reported that she has engaged in outpatient therapy \"on and off\" throughout her life with limited success. Ms. Aviles is currently engaged in outpatient " therapy and stated that this has been helpful in managing her anxiety.     Objective     Mental Status Exam:   MENTAL STATUS EXAM   General Appearance:  Cleanly groomed and dressed and well developed  Eye Contact:  Poor eye contact  Attitude:  Cooperative, polite and candid  Motor Activity:  Normal gait, posture and fidgety  Muscle Strength:  Normal  Speech:  Monotone, hyper talkative and normal rate, tone, volume  Language:  Spontaneous and stereotypical  Mood and affect:  Normal, pleasant and flat  Thought Process:  Logical and goal-directed  Associations/ Thought Content:  No delusions  Hallucinations:  None  Suicidal Ideations:  Not present  Homicidal Ideation:  Not present  Sensorium:  Alert and clear  Orientation:  Person, place, time and situation  Immediate Recall, Recent, and Remote Memory:  Intact  Attention Span/ Concentration:  Easily distracted  Fund of Knowledge:  Appropriate for age and educational level  Intellectual Functioning:  Average range  Insight:  Good  Judgement:  Good  Reliability:  Good  Impulse Control:  Fair     SUICIDE RISK ASSESSMENT/CSSRS:  1. Does client have thoughts of suicide? no  2. Does client have intent for suicide? no  3. Does client have a current plan for suicide? no  4. History of suicide attempts: no  5. Family history of suicide or attempts: no  6. History of violent behaviors towards others or property or thoughts of committing suicide: yes  She has had suicidal thoughts in the past but not currently and has never had a plan or attempt.     7. History of sexual aggression toward others: no  8. Access to firearms or weapons: no    Subjective     PHQ-9 Depression Screening:   PHQ-9 Total Score: 17   Patient screened positive for depression based on a PHQ-9 score of 17 on 2/4/2025. Follow-up recommendations include:  continue with outpatient therapy and medication management .      CASSANDRA-7  Feeling nervous, anxious or on edge: Nearly every day  Not being able to stop or  control worrying: Nearly every day  Worrying too much about different things: Nearly every day  Trouble Relaxing: Nearly every day  Being so restless that it is hard to sit still: Nearly every day  Feeling afraid as if something awful might happen: More than half the days  Becoming easily annoyed or irritable: More than half the days  CASSANDRA 7 Total Score: 19  If you checked any problems, how difficult have these problems made it for you to do your work, take care of things at home, or get along with other people: Very difficult    Interpersonal/Relational:  Marital Status:   Ms. Aviles reported that she has been  for 25 years and that they have been together since . She reported that the relationship is positive.   Support system: significant other, friends, and son and mother  Ms. Aviles reported that she has a positive relationship with her family of origin. Ms. Aviles reported that she has a younger brother. She explained that her father  in .   Ms. Aviles reported that she has some friends she has had for a long time. She has a best friend who she talks with often. Many of her friends have moved to different states which makes the relationships a little more difficult.   Ms. Aviles reported that she keeps friends well but does not often connect well with others. She has a few close friends but does not socialize much. She lives with her , son, mother, and sometimes her brother.     Family Psychiatric/Substance Use History:  Ms. Aviles reported that her father may have had ASD and OCD. Her father had psychiatric issues, including OCD. Her brother is an alcoholic and has PTSD and multiple acquired TBIs.    Educational/Work History:   Highest level of education obtained: college  Bachelors in Psychology   Ms. Aviles reported that she loved school. She explained that she was an underachiever because she did not want to do more work. She did well in school but did not go to higher level  versions of classes so that she would not be stressed out. Ms. Aviles reported that she did well in college. Ms. Aviles reported that she got along well with teachers and tended to avoid other students. She explained that she would try to avoid working in groups.   Ever been active duty in the ? no  Client's occupation:  at [employer]  Ms. Aviles reported that she does not like her job but that she likes that she is not working with the public. Ms. Aviles reported that she gets along well with her supervisors and tends to dislike or avoid coworkers. She noted that she has a few people that she talks to and has a relationship with.     Mental/Behavioral Health History:  Mental/Behavioral Health History:  Past diagnoses: Generalized Anxiety Disorder, Major Depressive Disorder    Are there any significant health issues (current or past): See past medical history   She has Hashimoto's disease, which has caused her significant issues for 15 years, including brain fog. She has been experiencing dizzy spells for about 2 years, which have increased in frequency. She has fallen twice due to dizziness. She is currently under the care of an endocrinologist, cardiologist, and nephrologist.    History of seizures: no    History of Substance Use:  Client History:  Denied    Lifestyle:  Current hobbies include: making things, reading, puzzles    Significant Life Events:   Verbal, physical, sexual abuse? no  Has client experienced a death / loss of relationship? no  Has client experienced a major accident or tragic events? no    Legal History:  The patient has no significant history of legal issues. The patient has no current pending legal charges. The patient has no history of significant violence.    Social History:   Social History     Socioeconomic History    Marital status:    Tobacco Use    Smoking status: Never    Smokeless tobacco: Never    Tobacco comments:     Strongly anti-smoker   Vaping  Use    Vaping status: Never Used    Passive vaping exposure: Yes   Substance and Sexual Activity    Alcohol use: Not Currently     Comment: maybe 5 times a year, not much to speak of really    Drug use: Never    Sexual activity: Yes     Partners: Male     Birth control/protection: Post-menopausal     Comment: secondary infertility, miscarriage        Past Medical History:   Past Medical History:   Diagnosis Date    Abnormal ECG 02/27/2024    at [facility]/ Dr Mc    Allergic 2019    latex, topical corticosteroids, sugar substitutes    Anemia 1990    resolved    Anxiety 1999    Arthritis 2021    lumbar spine, right big toe/ hallux rigidus    Asthma 1980s    resolved    Chronic kidney disease     Stage 3A diagnosed 10/2024    Chronic pain disorder     Clotting disorder 1987    heavy bleeding during menses    Colon polyp 2023 05 04    three removed during colonoscopy    Depression 1999    GERD (gastroesophageal reflux disease) 2020    increased heartburn since 2024 fall    Glaucoma 2023    eye pressure; tracking with opthalmologist after high eye pressure results    Headache 1994    History of medical problems 2001, 2018    PMDD, pelvic floor weakness    Hyperlipidemia 2000    Hypertension 2020    Hypothyroidism 2015    Hashimoto's Disease    Liver disease 2024    fatty deposits seen on CT 2024    Low back pain 2001    Neuromuscular disorder 2020    cubital and carpal tunnel symptoms    Obesity 2001    Urinary tract infection 1990    nothing chronic, but i have had them    Visual impairment 2023    crystals around optic nerve, tracking with opthalmologist       Past Surgical History:   Past Surgical History:   Procedure Laterality Date    COLONOSCOPY  2023 May 4th    three pre-cancerous polyps removed       Family History:   Family History   Problem Relation Age of Onset    Arthritis Mother         RA and Lupus/SLE    Depression Mother         history of, not at present    Thyroid disease Mother         hypothyroidism     Lupus Mother     COPD Mother         emphysema was mentioned on her last lung CT     Hyperlipidemia Mother     Vision loss Mother         cataracts    Hypertension Father     Anxiety disorder Father     Heart disease Father         heart attack at 69yo    Hyperlipidemia Father         since his 20s    Stroke Father         several minor strokes in his 60s    Thyroid disease Father         hyperthyroidism    Heart attack Father         at 69yo,  within 6 weeks    Kidney disease Father         I don't think we knew it until after the heart attack    Post-traumatic stress disorder Brother     Alcohol abuse Brother     Drug abuse Brother     Cancer Maternal Aunt         i think skin cancer? recent dx    Thyroid disease Maternal Aunt         hypothyroidism, recently dx Hashimoto's    Skin cancer Maternal Aunt     Early death Maternal Uncle         car accident at 18- I know, not a health issue, but you asked.    Heart attack Paternal Aunt         2nd h/a at 71 yo; fatal    Diabetes Paternal Aunt     Heart disease Paternal Aunt         fatal heart attack at 69yo, had had previous (I think a stent?)    Thyroid disease Paternal Aunt         hypo, I think    COPD Maternal Grandmother         lifelong smoker    Cancer Maternal Grandfather         lifelong smoker, lung cancer    Lung cancer Maternal Grandfather     Breast cancer Paternal Grandmother     Cancer Paternal Grandmother         breast cancer- her whole family had cancer of some type    Early death Paternal Grandmother         breast cancer at 58 years old    Hypertension Paternal Grandfather     Hyperlipidemia Paternal Grandfather     Liver disease Paternal Grandfather          at 68    Stroke Paternal Grandfather         sever impairment afterwards, had to live in care facility    Hyperlipidemia Son     Anxiety disorder Son         also autistic    Depression Son     Developmental Disability Son         autism, no intellectual or verbal delays     Liver disease Son         NAFLD/ BECK at 20 years old    Mental illness Son         are we counting autism here?    Autism Son     Learning disabilities Son         ADHD diagnosis in 2024    Alcohol abuse Brother         currently using naltrexone    Anxiety disorder Brother     Drug abuse Brother         previously an issue couple decades ago    Ovarian cancer Neg Hx        Medications:     Current Outpatient Medications:     DULoxetine (Cymbalta) 20 MG capsule, Take 1 capsule by mouth Daily., Disp: 30 capsule, Rfl: 2    ezetimibe (Zetia) 10 MG tablet, Take 1 tablet by mouth Daily., Disp: 30 tablet, Rfl: 5    famotidine (PEPCID) 40 MG tablet, Take 1 tablet by mouth 2 (Two) Times a Day., Disp: 180 tablet, Rfl: 3    fexofenadine (ALLEGRA) 180 MG tablet, Take 1 tablet by mouth As Needed., Disp: , Rfl:     Levomefolate Glucosamine (Methyl-Folate) 1700 MCG capsule, 15 mg., Disp: , Rfl:     levothyroxine (SYNTHROID, LEVOTHROID) 125 MCG tablet, Take 1 tablet by mouth daily., Disp: 90 tablet, Rfl: 2    losartan (COZAAR) 50 MG tablet, Take 1 tablet by mouth Daily., Disp: 90 tablet, Rfl: 3    Vitamin E 180 MG (400 UNIT) capsule capsule, , Disp: , Rfl:     Allergies:   Allergies   Allergen Reactions    Amlodipine Swelling    Aspartame Headache     migraines    Atorvastatin Myalgia    Buspirone Dizziness    Corticosteroids Hives and Swelling    Latex Hives       Quality Measures:     TOBACCO USE:  Tobacco Use: Low Risk  (2/5/2025)    Patient History     Smoking Tobacco Use: Never     Smokeless Tobacco Use: Never     Passive Exposure: Not on file      Never smoker    I advised Jeannette of the risks of tobacco use.     Assessment / Plan / Testing Results      Visit Diagnosis/Orders Placed This Visit:    ICD-10-CM ICD-9-CM   1. Generalized anxiety disorder  F41.1 300.02   2. Mild episode of recurrent major depressive disorder  F33.0 296.31        Progress Note:  The clinician met with patient in office today. The clinician  conducted a brief check in with patient regarding mood, recent psychosocial events, and stressors.  The clinician continuously assessed for environmental and psychological safety. Today session focused on discussing the patient's history and current symptoms. The patient was referred for ADHD and ASD assessment. The clinician used motivational interview and reflective listening throughout session today.    Allowed client to freely discuss issues  without interruption or judgement with unconditional positive regard, active listening skills, and empathy. Clinician provided a safe, confidential environment to facilitate the development of a positive therapeutic relationship and encouraged open, honest communication. Assisted client in identifying risk factors which would indicate the need for higher level of care including thoughts to harm self or others and/or self-harming behavior and encouraged client to contact this office, call 911, or present to the nearest emergency room should any of these events occur. Discussed crisis intervention services and means to access. Assisted client in processing session content; acknowledged and normalized client’s thoughts, feelings, and concerns by utilizing a person-centered approach in efforts to build appropriate rapport and a positive therapeutic relationship with open and honest communication.     Patient Response:  The patient responded positively as evidenced by her active engagement and participation.    Diagnosis:  The patient was previously diagnosed Generalized Anxiety Disorder and Major Depressive Disorder.  Therefore, these diagnoses will be utilized for this session and will be updated following the results of the psychosocial assessment.    Treatment Plan:  Goals:  Short term: The patient will attend the testing session.  The patient will complete the required assessment forms and engage in the assessment process.     Long term: The clinician will interpret and  integrate the information and results into a psychosocial report.    Follow Up:   Return in about 4 weeks (around 3/4/2025) for Testing ADHD AND ASD.    Vanessa Ayala PsyD, [facility]     Patient or patient representative verbalized consent for the use of Ambient Listening during the visit with  Vanessa Ayala PsyD for chart documentation. 2/5/2025  16:29 EST

## 2025-02-15 DIAGNOSIS — I10 PRIMARY HYPERTENSION: ICD-10-CM

## 2025-02-17 RX ORDER — AMLODIPINE BESYLATE 10 MG/1
10 TABLET ORAL DAILY
Qty: 90 TABLET | OUTPATIENT
Start: 2025-02-17

## 2025-02-20 ENCOUNTER — TELEMEDICINE (OUTPATIENT)
Age: 50
End: 2025-02-20
Payer: COMMERCIAL

## 2025-02-20 ENCOUNTER — PATIENT MESSAGE (OUTPATIENT)
Dept: FAMILY MEDICINE CLINIC | Facility: CLINIC | Age: 50
End: 2025-02-20
Payer: COMMERCIAL

## 2025-02-20 DIAGNOSIS — F41.1 GENERALIZED ANXIETY DISORDER: Primary | ICD-10-CM

## 2025-02-20 DIAGNOSIS — F33.0 MILD EPISODE OF RECURRENT MAJOR DEPRESSIVE DISORDER: ICD-10-CM

## 2025-02-20 RX ORDER — LORAZEPAM 0.5 MG/1
0.5 TABLET ORAL DAILY PRN
Qty: 5 TABLET | Refills: 0 | Status: SHIPPED | OUTPATIENT
Start: 2025-02-20

## 2025-02-20 NOTE — PROGRESS NOTES
Video Visit      Patient Name: Jeannette Aviles  : 1975   MRN: [id number]     Referring Provider: Adelaide Mc DO    Chief Complaint:      ICD-10-CM ICD-9-CM   1. Generalized anxiety disorder  F41.1 300.02   2. Mild episode of recurrent major depressive disorder  F33.0 296.31        This provider is at Home office using a secure [employer] Video Visit through [employer]. Patient is being seen remotely via telehealth video visit at their home address in [place], and stated they are in a secure environment for this session. The patient's condition being diagnosed/treated is appropriate for telemedicine. The provider identified herself as well as her credentials. The patient, and/or patients guardian, consent to be seen remotely, and when consent is given they understand that the consent allows for patient identifiable information to be sent to a third party as needed. They may refuse to be seen remotely at any time. The electronic data is encrypted and password protected, and the patient and/or guardian has been advised of the potential risks to privacy not withstanding such measures.    The patient has chosen to receive care today through a telehealth video visit. Do you consent to use a video/audio connection for your medical care today? Yes    Mode of Visit: Video  Location of patient: -HOME-  Location of provider: +[facility]  You have chosen to receive care through a telehealth visit.  The patient has signed the video visit consent form.  The visit included audio and video interaction. No technical issues occurred during this visit.      History of Present Illness:   Jeannette Aviles is a 50 y.o. female who is being seen by a video visit today for follow up and medication management.        Subjective   Patient Reports:   History of Present Illness  The patient presents via virtual visit for evaluation of anxiety, panic and depression.    She has discontinued the use of Paxil due to its  ineffectiveness at a dosage of 30 mg. The initial switch to Paxil was prompted by her transition off duloxetine, which was thought to be affecting her kidney function. Her nephrologist suggested that the adverse effects on her kidneys were likely due to the cessation of meloxicam. She experienced an increase in the intensity and frequency of dizziness, which has since subsided. She attributes this to the discontinuation of duloxetine. Upon complete cessation of duloxetine, her condition deteriorated significantly, leading to emotional distress at work. She consulted her primary care physician, who prescribed a 20 mg dose of duloxetine, a reduction from her previous 80 mg dose. She believes she had developed a tolerance to the higher dose, rendering it less effective.     She continues to experience panic attacks. She has been on the reduced dose for approximately 2 weeks and would like to stay on the low dose for the time being. Her last panic attack occurred on 01/26/2025 or 01/27/2025 at work. She reports being able to manage impending panic attacks at home but not at work. She reports multiple instances of standing at her work computer crying. She reports at home she has time to utilize coping mechanisms that are effective at managing her symptoms. She reports it is difficult to utilize the same coping mechanisms at work. She has requested workplace accommodations with her therapist, including no mandatory overtime and up to 4 absences per week, but has only been granted 4 absences per month and up to 4 extra 15-minute breaks per shift. She has missed several days of work due to anxiety and dizziness. She rates her anxiety as fluctuating between 3 and 7 on a scale of 10, with 3 days out of 7 being particularly challenging. She reports no suicidal or homicidal ideation, self-harm, or hallucinations. She reports difficulty sleeping, with 2 instances of insomnia in 02/2025. She uses sleep patches, which she cuts  into smaller doses, and averages 5 to 6 hours of sleep per night. Her appetite has improved. She experiences shortness of breath, dizziness, and confusion during panic attacks, but is also following up with cardiology. She is scheduled for autism and ADHD testing with Vanessa Ayala PsyD at the end of 06/2025. She is seeking a low dose of Ativan for use during work hours when she is unable to manage her symptoms.    Supplemental Information  She is also under the care of a cardiologist for mild dizziness and shortness of breath, particularly when changing positions. She has undergone imaging and testing, all of which have returned normal results. She is awaiting a Tilt test and has worn a Holter monitor and completed coronary CTA, which showed no abnormalities.    MEDICATIONS  Discontinued: Paxil, meloxicam    PHQ-9= will assess at F/U visit  CASSANDRA-7= will assess at F/U visit    Review of Systems:   Review of Systems   Constitutional:  Negative for appetite change, fatigue and unexpected weight change.   Eyes:  Negative for visual disturbance.   Respiratory:  Positive for shortness of breath. Negative for chest tightness.    Cardiovascular:  Negative for chest pain and palpitations.   Gastrointestinal:  Negative for nausea.   Musculoskeletal:  Negative for gait problem.   Skin:  Negative for rash and wound.   Neurological:  Positive for dizziness. Negative for tremors, seizures, weakness, light-headedness and headaches.   Psychiatric/Behavioral:  Positive for confusion and dysphoric mood. Negative for agitation, behavioral problems, decreased concentration, hallucinations, self-injury, sleep disturbance and suicidal ideas. The patient is nervous/anxious. The patient is not hyperactive.    Sleep pattern: 5-6 hours per night  Appetite: normal to decreased- occasionally forgets to eat, but is getting better   Patient reports shortness of breath, dizziness and confusion related to panic and is also follow up with  cardiology for symptoms occurring with exertion. Has a future tilt test scheduled.    RISK ASSESSMENT:  Patient denies any thoughts of suicide or intent today. Patient denies any suicidal or homicidal ideation today. Patient denies any high risk factors today.     Medications:     Current Outpatient Medications:     DULoxetine (Cymbalta) 20 MG capsule, Take 1 capsule by mouth Daily., Disp: 30 capsule, Rfl: 2    ezetimibe (Zetia) 10 MG tablet, Take 1 tablet by mouth Daily., Disp: 30 tablet, Rfl: 5    famotidine (PEPCID) 40 MG tablet, Take 1 tablet by mouth 2 (Two) Times a Day., Disp: 180 tablet, Rfl: 3    fexofenadine (ALLEGRA) 180 MG tablet, Take 1 tablet by mouth As Needed., Disp: , Rfl:     Levomefolate Glucosamine (Methyl-Folate) 1700 MCG capsule, 15 mg., Disp: , Rfl:     levothyroxine (SYNTHROID, LEVOTHROID) 125 MCG tablet, Take 1 tablet by mouth daily., Disp: 90 tablet, Rfl: 2    LORazepam (Ativan) 0.5 MG tablet, Take 1 tablet by mouth Daily As Needed for Anxiety., Disp: 5 tablet, Rfl: 0    losartan (COZAAR) 50 MG tablet, Take 1 tablet by mouth Daily., Disp: 90 tablet, Rfl: 3    Vitamin E 180 MG (400 UNIT) capsule capsule, , Disp: , Rfl:     Medication Considerations:  ROB reviewed and appropriate.      Allergies:   Allergies   Allergen Reactions    Amlodipine Swelling    Aspartame Headache     migraines    Atorvastatin Myalgia    Buspirone Dizziness    Corticosteroids Hives and Swelling    Latex Hives       Objective     Physical Exam:  Vital Signs: There were no vitals filed for this visit.  There is no height or weight on file to calculate BMI.     Mental Status Exam:   MENTAL STATUS EXAM   General Appearance:  Cleanly groomed and dressed and well developed  Eye Contact:  Good eye contact  Attitude:  Cooperative  Motor Activity:  Normal gait, posture and fidgety  Muscle Strength:  Normal  Speech:  Hyper talkative  Language:  Spontaneous  Mood and affect:  Depressed and anxious  Hopelessness:   Denies  Loneliness: Denies  Thought Process:  Logical  Associations/ Thought Content:  No delusions  Hallucinations:  None  Suicidal Ideations:  Not present  Homicidal Ideation:  Not present  Sensorium:  Alert and clear  Orientation:  Person, place, time and situation  Immediate Recall, Recent, and Remote Memory:  Intact  Attention Span/ Concentration:  Easily distracted  Fund of Knowledge:  Appropriate for age and educational level  Intellectual Functioning:  Average range  Insight:  Good  Judgement:  Good  Reliability:  Good  Impulse Control:  Fair       @RESULASTCBCDIFFPANEL,TSH,LABLIPI,XMNETWKQ42,IIDAKATO14,MG,FOLATE,PROLACTIN,CRPRESULT,CMP,[id number])@    Lab Results   Component Value Date    GLUCOSE 71 12/30/2024    BUN 16 12/30/2024    CREATININE 1.01 (H) 12/30/2024    EGFR 68.8 12/30/2024    BCR 15.8 12/30/2024    K 3.8 12/30/2024    CO2 25.0 12/30/2024    CALCIUM 9.7 12/30/2024    ALBUMIN 4.4 10/01/2024    BILITOT 0.5 10/01/2024    AST 17 10/01/2024    ALT 13 10/01/2024       Lab Results   Component Value Date    WBC 7.92 12/30/2024    HGB 13.2 12/30/2024    HCT 41.2 12/30/2024    MCV 87.7 12/30/2024     12/30/2024       Lab Results   Component Value Date    CHOL 249 (H) 10/01/2024    TRIG 97 10/01/2024    HDL 41 10/01/2024     (H) 10/01/2024       Assessment / Plan      Visit Diagnosis/Orders Placed This Visit:  Diagnoses and all orders for this visit:    1. Generalized anxiety disorder (Primary)  -     LORazepam (Ativan) 0.5 MG tablet; Take 1 tablet by mouth Daily As Needed for Anxiety.  Dispense: 5 tablet; Refill: 0    2. Mild episode of recurrent major depressive disorder       Assessment & Plan  1. Anxiety and Depression.  She has shown improvement since restarting Cymbalta (duloxetine) 20 mg daily. She reports fewer panic attacks and better overall mood stability, although she still experiences some anxiety and panic attacks, particularly under stress. She has been off Paxil for the  past two weeks. She will continue with the current dosage of Cymbalta 20 mg daily for another 4-6weeks to evaluate its full effectiveness. She is advised to monitor for any changes or negative side effects and report them promptly. She has requested a small amount of Ativan for acute anxiety episodes at work. A limited prescription for Ativan 0.5 mg will be provided, with caution about the risk of dependency and impairment. She is advised to use it only when necessary and not as a long-term solution.      Functional Status: Moderate impairment     Prognosis: Good with Ongoing Treatment     Impression/Formulation:  Patient appeared alert and oriented.  Patient is voluntarily requesting to continue outpatient psychiatric treatment at [facility].  Patient is receptive to assistance with maintaining a stable lifestyle.  Patient presents with history of     ICD-10-CM ICD-9-CM   1. Generalized anxiety disorder  F41.1 300.02   2. Mild episode of recurrent major depressive disorder  F33.0 296.31   . Reviewed patient's previous provider notes. Reviewed most recent labs. Patient meets DSM V diagnostic criteria for diagnoses. Diagnoses may be updated as more information becomes available.     Treatment Plan:   Continue Cymbalta 20mg PO qd. No refills needed  Initiate Ativan 0.5 mg PO PRN for panic attacks Limited supply prescribed (5 tablets).  Patient and provider discussed risk of long-term use of medication. Patient verbalized understanding.   Vanessa Ayala PsyD for ADHD evaulation and Autism testing scheduled for June  Encouraged psychotherapy  Follow up in 6 week and as needed    Patient will continue supportive psychotherapy efforts and medications as indicated. Clinic will obtain release of information for current treatment team for continuity of care as needed. Patient will contact this office, call 911 or present to the nearest emergency room should suicidal or homicidal ideations occur.  Discussed medication options and treatment plan of prescribed medication(s) as well as the risks, benefits, and potential side effects. Patient ackowledged and verbally consented to continue with current treatment plan and was educated on the importance of compliance with treatment and follow-up appointments.     Instructed will take 4-6 weeks for full therapeutic effect. Medication risks and side effects discussed with patient including risk for worsening mood, changes in behavior, thoughts of suicide or homicide, induction of fide, serotonin syndrome, rare hyponatremia, rare SIADH, withdrawal symptoms if abrupt withdrawal, sexual dysfunction.   If any thoughts of SI or HI, worsening mood or changes in behavior, call 911 or crisis line [phone], or go to nearest ER at once. Patient agrees to notify close family/friend of new trial of antidepressants/info above. Pt.verbalizes understanding and consents to treatment with this medication.     The patient is being prescribed a controlled substance as part of the treatment plan. The patient has been educated of appropriate use of the medication, including risks and side effects such as somnolence, limited ability to drive and/or work or function safely, potential for dependence, respiratory depression, falls, change in blood pressure, changes in heart rhythm or heart rate, activation of other mental illnesses, and overdose among others. Patient is also informed that the medication is to be used by the patient only, and to avoid any combined use of ETOH, or other substances, with this medication unless prescribed and as directed by a Provider.  The patient verbalized understanding and agreement with this in their own words.     Quality Measures:   Never smoker    I advised Jeannette Aviles of the risks of tobacco use.       Follow Up:   Return in about 6 weeks (around 4/3/2025).    Patient or patient representative verbalized consent for the use of Ambient Listening  during the visit with  LYNN Berry for chart documentation. 2/20/2025  13:24 EST    LYNN Berry, PMHNP-BC [facility] [address]     Answers submitted by the patient for this visit:  Anxiety (Submitted on 2/20/2025)  Chief Complaint: Anxiety  Visit: follow-up  Frequency: constantly  Severity: severe  depressed mood: No  dry mouth: No  excessive worry: Yes  insomnia: Yes  irritability: No  malaise/fatigue: Yes  obsessions: No  Sleep quality: fair  Hours of sleep per night: 5 Hours  Aggravated by: work stress  Medication compliance: %  Additional information: Increased frequency of panic attacks, sensory overwhelm, weeping/ emotional dysregulation. Paxil was useless, went back to duloxetine at 20mg

## 2025-02-26 ENCOUNTER — HOSPITAL ENCOUNTER (OUTPATIENT)
Dept: CARDIOLOGY | Facility: HOSPITAL | Age: 50
Discharge: HOME | End: 2025-02-26
Admitting: INTERNAL MEDICINE
Payer: COMMERCIAL

## 2025-02-26 VITALS — WEIGHT: 214 LBS | BODY MASS INDEX: 33.59 KG/M2 | HEIGHT: 67 IN

## 2025-02-26 DIAGNOSIS — R42 ORTHOSTATIC DIZZINESS: ICD-10-CM

## 2025-02-26 PROCEDURE — 93660 TILT TABLE EVALUATION: CPT

## 2025-02-27 LAB
MAXIMAL PREDICTED HEART RATE: 171 BPM
STRESS TARGET HR: 145 BPM

## 2025-03-04 LAB
MAXIMAL PREDICTED HEART RATE: 171 BPM
STRESS TARGET HR: 145 BPM

## 2025-03-05 ENCOUNTER — OFFICE VISIT (OUTPATIENT)
Dept: FAMILY MEDICINE CLINIC | Facility: CLINIC | Age: 50
End: 2025-03-05
Payer: COMMERCIAL

## 2025-03-05 VITALS
TEMPERATURE: 98 F | BODY MASS INDEX: 34.37 KG/M2 | HEART RATE: 98 BPM | DIASTOLIC BLOOD PRESSURE: 110 MMHG | OXYGEN SATURATION: 99 % | SYSTOLIC BLOOD PRESSURE: 150 MMHG | HEIGHT: 67 IN | WEIGHT: 219 LBS

## 2025-03-05 DIAGNOSIS — J01.90 ACUTE NON-RECURRENT SINUSITIS, UNSPECIFIED LOCATION: Primary | ICD-10-CM

## 2025-03-05 DIAGNOSIS — J06.9 UPPER RESPIRATORY TRACT INFECTION, UNSPECIFIED TYPE: ICD-10-CM

## 2025-03-05 DIAGNOSIS — I10 PRIMARY HYPERTENSION: ICD-10-CM

## 2025-03-05 LAB
EXPIRATION DATE: NORMAL
EXPIRATION DATE: NORMAL
FLUAV AG UPPER RESP QL IA.RAPID: NOT DETECTED
FLUBV AG UPPER RESP QL IA.RAPID: NOT DETECTED
INTERNAL CONTROL: NORMAL
INTERNAL CONTROL: NORMAL
Lab: NORMAL
Lab: NORMAL
S PYO AG THROAT QL: NEGATIVE
SARS-COV-2 AG UPPER RESP QL IA.RAPID: NOT DETECTED

## 2025-03-05 PROCEDURE — 99214 OFFICE O/P EST MOD 30 MIN: CPT | Performed by: STUDENT IN AN ORGANIZED HEALTH CARE EDUCATION/TRAINING PROGRAM

## 2025-03-05 PROCEDURE — 87428 SARSCOV & INF VIR A&B AG IA: CPT | Performed by: STUDENT IN AN ORGANIZED HEALTH CARE EDUCATION/TRAINING PROGRAM

## 2025-03-05 PROCEDURE — 87880 STREP A ASSAY W/OPTIC: CPT | Performed by: STUDENT IN AN ORGANIZED HEALTH CARE EDUCATION/TRAINING PROGRAM

## 2025-03-05 RX ORDER — BENZONATATE 100 MG/1
100 CAPSULE ORAL 3 TIMES DAILY PRN
Qty: 30 CAPSULE | Refills: 0 | Status: SHIPPED | OUTPATIENT
Start: 2025-03-05

## 2025-03-05 NOTE — PROGRESS NOTES
Established Office Visit      Patient Name: Jeannette Aviles  : 1975   MRN: [id number]   Care Team: Patient Care Team:  Adelaide Mc DO as PCP - General (Internal Medicine)  Marialuisa Lu MD as Consulting Physician (Endocrinology)  Rony Goode MD as Consulting Physician (Cardiology)  Adelaide Mc DO as Referring Physician (Internal Medicine)  Mona Banegas PA-C as Physician Assistant (Cardiology)    Chief Complaint:    Chief Complaint   Patient presents with    Illness     Flu symptoms: cough nasal congestion head and body aches, sore throat. Since monday       History of Present Illness: Jeannette Aviles is a 50 y.o. female who is here today to follow up with flulike symptoms.     She reports sore throat, congestion, facial pressure, nonproductive cough, body aches, low appetite ongoing for 4 days. Symptoms have only worsened over the past few days. No fever, vomiting or diarrhea. No known sick contacts.  She has been using nasal rinse, warm drink with honey, coricidin, tylenol, mucinex without improvement.     BP elevated today     Subjective      Review of Systems:   Review of Systems - See HPI    I have reviewed and the following portions of the patient's history were updated as appropriate: past family history, past medical history, past social history, past surgical history and problem list.    Medications:     Current Outpatient Medications:     DULoxetine (Cymbalta) 20 MG capsule, Take 1 capsule by mouth Daily., Disp: 30 capsule, Rfl: 2    ezetimibe (Zetia) 10 MG tablet, Take 1 tablet by mouth Daily., Disp: 30 tablet, Rfl: 5    famotidine (PEPCID) 40 MG tablet, Take 1 tablet by mouth 2 (Two) Times a Day., Disp: 180 tablet, Rfl: 3    fexofenadine (ALLEGRA) 180 MG tablet, Take 1 tablet by mouth As Needed., Disp: , Rfl:     Levomefolate Glucosamine (Methyl-Folate) 1700 MCG capsule, 15 mg., Disp: , Rfl:     levothyroxine (SYNTHROID, LEVOTHROID) 125 MCG tablet,  "Take 1 tablet by mouth daily., Disp: 90 tablet, Rfl: 2    LORazepam (Ativan) 0.5 MG tablet, Take 1 tablet by mouth Daily As Needed for Anxiety., Disp: 5 tablet, Rfl: 0    losartan (COZAAR) 50 MG tablet, Take 1 tablet by mouth Daily., Disp: 90 tablet, Rfl: 3    Vitamin E 180 MG (400 UNIT) capsule capsule, , Disp: , Rfl:     amoxicillin-clavulanate (AUGMENTIN) 875-125 MG per tablet, Take 1 tablet by mouth 2 (Two) Times a Day for 7 days., Disp: 14 tablet, Rfl: 0    benzonatate (Tessalon Perles) 100 MG capsule, Take 1 capsule by mouth 3 (Three) Times a Day As Needed for Cough., Disp: 30 capsule, Rfl: 0    Allergies:   Allergies   Allergen Reactions    Amlodipine Swelling    Aspartame Headache     migraines    Atorvastatin Myalgia    Buspirone Dizziness    Corticosteroids Hives and Swelling    Latex Hives       Objective     Physical Exam:  Vital Signs:   Vitals:    03/05/25 1137   BP: (!) 150/110   Pulse: 98   Temp: 98 °F (36.7 °C)   TempSrc: Oral   SpO2: 99%   Weight: 99.3 kg (219 lb)   Height: 170.2 cm (67\")     Body mass index is 34.3 kg/m².     Physical Exam  Vitals reviewed.   Constitutional:       Appearance: Normal appearance. She is ill-appearing.   HENT:      Right Ear: Tympanic membrane, ear canal and external ear normal.      Left Ear: Tympanic membrane, ear canal and external ear normal.      Nose: Congestion and rhinorrhea present.      Mouth/Throat:      Mouth: Mucous membranes are moist.      Pharynx: Posterior oropharyngeal erythema present. No oropharyngeal exudate.   Eyes:      Conjunctiva/sclera: Conjunctivae normal.   Cardiovascular:      Rate and Rhythm: Normal rate.   Pulmonary:      Effort: Pulmonary effort is normal. No respiratory distress.   Skin:     General: Skin is warm and dry.   Neurological:      Mental Status: She is alert.   Psychiatric:         Mood and Affect: Mood normal.         Behavior: Behavior normal.         Judgment: Judgment normal.         Assessment / Plan  "     Assessment/Plan:   Problems Addressed This Visit  Diagnoses and all orders for this visit:    1. Acute non-recurrent sinusitis, unspecified location (Primary)  -     amoxicillin-clavulanate (AUGMENTIN) 875-125 MG per tablet; Take 1 tablet by mouth 2 (Two) Times a Day for 7 days.  Dispense: 14 tablet; Refill: 0    2. Upper respiratory tract infection, unspecified type  -     POCT rapid strep A  -     POCT SARS-CoV-2 Antigen HERMINIA + Flu  -     benzonatate (Tessalon Perles) 100 MG capsule; Take 1 capsule by mouth 3 (Three) Times a Day As Needed for Cough.  Dispense: 30 capsule; Refill: 0    3. Primary hypertension      Strep, covid, flu POC negative today     Sinusitis - Tx with augmentin BID x 7 days. Continue mucinex and avoid OTC decongestants which may raise BP. Ok to use tessalon perles prn cough. Advised patient to call clinic if they develop fever or if symptoms worsen/persist.     HTN - uncontrolled, likely related to acute illness as her BP is typically well controlled. Encouraged her to take one extra dose of losartan 50mg today. Monitor BP at home and let me know if persistently elevated. She has previously taken losartan 100mg daily to keep BP at goal and we may need to return to this dose.       Plan of care reviewed with patient at the conclusion of today's visit. Education was provided regarding diagnosis and management.  Patient verbalizes understanding of and agreement with management plan.    Follow Up: as currently scheduled, sooner if needed for worsening symptoms.   No follow-ups on file.        DO RADHA Mackay RD  [facility]  [address]  [facility] [phone]  Fax [phone]  Phone [phone]

## 2025-03-16 ENCOUNTER — PATIENT MESSAGE (OUTPATIENT)
Dept: FAMILY MEDICINE CLINIC | Facility: CLINIC | Age: 50
End: 2025-03-16
Payer: COMMERCIAL

## 2025-03-16 DIAGNOSIS — I10 PRIMARY HYPERTENSION: ICD-10-CM

## 2025-03-17 RX ORDER — LOSARTAN POTASSIUM 100 MG/1
100 TABLET ORAL DAILY
Qty: 90 TABLET | Refills: 3 | Status: SHIPPED | OUTPATIENT
Start: 2025-03-17

## 2025-03-28 DIAGNOSIS — I10 PRIMARY HYPERTENSION: ICD-10-CM

## 2025-03-28 DIAGNOSIS — L29.9 ITCHY SKIN: ICD-10-CM

## 2025-03-28 RX ORDER — MELOXICAM 7.5 MG/1
TABLET ORAL
Qty: 120 TABLET | Refills: 1 | OUTPATIENT
Start: 2025-03-28

## 2025-03-28 RX ORDER — FAMOTIDINE 40 MG/1
40 TABLET, FILM COATED ORAL 2 TIMES DAILY
Qty: 180 TABLET | Refills: 2 | Status: SHIPPED | OUTPATIENT
Start: 2025-03-28

## 2025-03-28 RX ORDER — HYDROCHLOROTHIAZIDE 12.5 MG/1
12.5 TABLET ORAL DAILY
Qty: 90 TABLET | Refills: 2 | OUTPATIENT
Start: 2025-03-28

## 2025-03-31 ENCOUNTER — PATIENT MESSAGE (OUTPATIENT)
Dept: FAMILY MEDICINE CLINIC | Facility: CLINIC | Age: 50
End: 2025-03-31
Payer: COMMERCIAL

## 2025-03-31 DIAGNOSIS — K09.1 NASOPALATINE CYST: Primary | ICD-10-CM

## 2025-04-02 ENCOUNTER — OFFICE VISIT (OUTPATIENT)
Age: 50
End: 2025-04-02
Payer: COMMERCIAL

## 2025-04-02 VITALS
HEIGHT: 67 IN | SYSTOLIC BLOOD PRESSURE: 132 MMHG | WEIGHT: 221 LBS | DIASTOLIC BLOOD PRESSURE: 82 MMHG | BODY MASS INDEX: 34.69 KG/M2 | OXYGEN SATURATION: 97 % | HEART RATE: 93 BPM

## 2025-04-02 DIAGNOSIS — F41.1 GENERALIZED ANXIETY DISORDER: Primary | ICD-10-CM

## 2025-04-02 DIAGNOSIS — F33.0 MILD EPISODE OF RECURRENT MAJOR DEPRESSIVE DISORDER: ICD-10-CM

## 2025-04-02 RX ORDER — DULOXETIN HYDROCHLORIDE 30 MG/1
30 CAPSULE, DELAYED RELEASE ORAL 2 TIMES DAILY
Qty: 60 CAPSULE | Refills: 2 | Status: SHIPPED | OUTPATIENT
Start: 2025-04-02

## 2025-04-02 RX ORDER — LANOLIN ALCOHOL/MO/W.PET/CERES
1000 CREAM (GRAM) TOPICAL DAILY
COMMUNITY

## 2025-04-02 NOTE — PROGRESS NOTES
Follow Up Office Visit      Patient Name: Jeannette Aviles  : 1975   MRN: [id number]     Referring Provider: Adelaide Mc DO    Chief Complaint:      ICD-10-CM ICD-9-CM   1. Generalized anxiety disorder  F41.1 300.02   2. Mild episode of recurrent major depressive disorder  F33.0 296.31        History of Present Illness:   Jeannette Aviles is a 50 y.o. female who is here today for follow up and medication management.           Subjective      Patient Reports:   History of Present Illness  She has been experiencing significant stress due to her work schedule, which requires her to alternate between night shifts and daytime responsibilities for her 23-year-old son and elderly mother. This stress has led to sleep disturbances, including a recent episode of sleeping for 22 hours straight. She reports frequent crying episodes, although not at work, and rates her anxiety at a baseline of 5, with peaks up to 7 on a scale of 10. She also reports feelings of hopelessness and stress but does not endorse loneliness, suicidal or homicidal ideation, self-harm, or hallucinations. Her appetite is poor, often forgetting to eat until she experiences stomach pain. She is currently on Cymbalta 20 mg, which she believes could be increased due to persistent joint pain and stress. She was previously on meloxicam, which was discontinued due to adverse effects on her kidneys.      She has experienced two falls in her home, one in 2024 and another in 10/2024, both associated with dizziness. She also reports an incident at work where she felt dizzy but managed to sit down before falling.  She followed up with cardiology due to concern for POTS   She has undergone a tilt test, which yielded negative results. However, she has noticed that her symptoms are less frequent when she consumes Gatorade or electrolytes. She is uncertain if her symptoms are related to anxiety or her medications, as they do not appear  to be linked to anxious thoughts. She experiences these symptoms when climbing stairs or bending down at work but has found that avoiding these positions can reduce the frequency of symptoms. She has been having panic attacks and used Ativan once on 03/22/2025. She does not want to rely on Ativan because it is harder on her system.    She reports she is still pursuing psychotherapy.  She is still managing difficulties with requesting specific work accommodations.    SOCIAL HISTORY  She works in [employer].    MEDICATIONS  Current: Cymbalta, Ativan  Discontinued: meloxicam    PHQ-9= 15  CASSANDRA-7= 16     Review of Systems:   Review of Systems   Constitutional:  Negative for appetite change, fatigue and unexpected weight change.   Eyes:  Negative for visual disturbance.   Respiratory:  Positive for shortness of breath. Negative for chest tightness.    Cardiovascular:  Positive for chest pain. Negative for palpitations.   Gastrointestinal:  Positive for nausea.   Musculoskeletal:  Negative for gait problem.   Skin:  Negative for rash and wound.   Neurological:  Negative for dizziness, tremors, seizures, weakness, light-headedness and headaches.   Psychiatric/Behavioral:  Positive for confusion and dysphoric mood. Negative for agitation, behavioral problems, decreased concentration, hallucinations, self-injury, sleep disturbance and suicidal ideas. The patient is nervous/anxious. The patient is not hyperactive.    Sleep pattern: varies due to night shift  Appetite: poor appetite   Patient reports shortness of breath, dizziness and confusion related to panic and is also follow up with cardiology for symptoms occurring with exertion. Had a tilt test completed.     PHQ-9 Depression Screening  Little interest or pleasure in doing things? Several days   Feeling down, depressed, or hopeless? Almost all   PHQ-2 Total Score 4   Trouble falling or staying asleep, or sleeping too much? Almost all   Feeling tired or having little energy?  Over half   Poor appetite or overeating? Several days   Feeling bad about yourself - or that you are a failure or have let yourself or your family down? Several days   Trouble concentrating on things, such as reading the newspaper or watching television? Over half   Moving or speaking so slowly that other people could have noticed? Or the opposite - being so fidgety or restless that you have been moving around a lot more than usual? Over half   Thoughts that you would be better off dead, or of hurting yourself in some way? Not at all   PHQ-9 Total Score 15   If you checked off any problems, how difficult have these problems made it for you to do your work, take care of things at home, or get along with other people? Very difficult       CASSANDRA-7 Anxiety Screening  Over the last two weeks, how often have you been bothered by the following problems?  Feeling nervous, anxious or on edge: Nearly every day  Not being able to stop or control worrying: Nearly every day  Worrying too much about different things: Nearly every day  Trouble Relaxing: Nearly every day  Being so restless that it is hard to sit still: More than half the days  Becoming easily annoyed or irritable: Several days  Feeling afraid as if something awful might happen: Several days  CASSANDRA 7 Total Score: 16  If you checked any problems, how difficult have these problems made it for you to do your work, take care of things at home, or get along with other people: Very difficult    RISK ASSESSMENT:  Patient denies any thoughts or intent of suicide today. Patient denies any impulsive behavior today.     Medications:     Current Outpatient Medications:     ezetimibe (Zetia) 10 MG tablet, Take 1 tablet by mouth Daily., Disp: 30 tablet, Rfl: 5    famotidine (PEPCID) 40 MG tablet, Take 1 tablet by mouth twice daily., Disp: 180 tablet, Rfl: 2    fexofenadine (ALLEGRA) 180 MG tablet, Take 1 tablet by mouth As Needed., Disp: , Rfl:     Levomefolate Glucosamine  "(Methyl-Folate) 1700 MCG capsule, 15 mg., Disp: , Rfl:     levothyroxine (SYNTHROID, LEVOTHROID) 125 MCG tablet, Take 1 tablet by mouth daily., Disp: 90 tablet, Rfl: 2    LORazepam (Ativan) 0.5 MG tablet, Take 1 tablet by mouth Daily As Needed for Anxiety., Disp: 5 tablet, Rfl: 0    losartan (COZAAR) 100 MG tablet, Take 1 tablet by mouth Daily., Disp: 90 tablet, Rfl: 3    vitamin B-12 (CYANOCOBALAMIN) 1000 MCG tablet, Take 1 tablet by mouth Daily., Disp: , Rfl:     Vitamin E 180 MG (400 UNIT) capsule capsule, , Disp: , Rfl:     DULoxetine (Cymbalta) 30 MG capsule, Take 1 capsule by mouth 2 (Two) Times a Day., Disp: 60 capsule, Rfl: 2    Medication Considerations:  ROB reviewed and appropriate.      Allergies:   Allergies   Allergen Reactions    Amlodipine Swelling    Aspartame Headache     migraines    Atorvastatin Myalgia    Buspirone Dizziness    Corticosteroids Hives and Swelling    Latex Hives       Results Reviewed:   Yes     Objective     Physical Exam:  Vital Signs:   Vitals:    04/02/25 0831   BP: 132/82   Pulse: 93   SpO2: 97%   Weight: 100 kg (221 lb)   Height: 170.2 cm (67.01\")     Body mass index is 34.61 kg/m².     Mental Status Exam:   MENTAL STATUS EXAM   General Appearance:  Cleanly groomed and dressed and well developed  Eye Contact:  Good eye contact  Attitude:  Cooperative  Motor Activity:  Fidgety  Muscle Strength:  Other  Other Comment:  MAGGIE video visit  Speech:  Hyper talkative  Language:  Spontaneous  Mood and affect:  Depressed and anxious  Hopelessness:  3  Loneliness: Denies  Thought Process:  Circumstantial  Associations/ Thought Content:  No delusions  Hallucinations:  None  Suicidal Ideations:  Not present  Homicidal Ideation:  Not present  Sensorium:  Alert and clear  Orientation:  Person, place, time and situation  Immediate Recall, Recent, and Remote Memory:  Intact  Attention Span/ Concentration:  Easily distracted  Fund of Knowledge:  Appropriate for age and educational " level  Intellectual Functioning:  Average range  Insight:  Good  Judgement:  Fair  Reliability:  Good  Impulse Control:  Fair       @RESULASTCBCDIFFPANEL,TSH,LABLIPI,RPCPDDDV42,PQBYAQWB92,MG,FOLATE,PROLACTIN,CRPRESULT,CMP,[id number])@    Lab Results   Component Value Date    GLUCOSE 71 12/30/2024    BUN 16 12/30/2024    CREATININE 1.01 (H) 12/30/2024    EGFR 68.8 12/30/2024    BCR 15.8 12/30/2024    K 3.8 12/30/2024    CO2 25.0 12/30/2024    CALCIUM 9.7 12/30/2024    ALBUMIN 4.4 10/01/2024    BILITOT 0.5 10/01/2024    AST 17 10/01/2024    ALT 13 10/01/2024       Lab Results   Component Value Date    WBC 7.92 12/30/2024    HGB 13.2 12/30/2024    HCT 41.2 12/30/2024    MCV 87.7 12/30/2024     12/30/2024       Lab Results   Component Value Date    CHOL 249 (H) 10/01/2024    TRIG 97 10/01/2024    HDL 41 10/01/2024     (H) 10/01/2024       Assessment / Plan      Visit Diagnosis/Orders Placed This Visit:  Diagnoses and all orders for this visit:    1. Generalized anxiety disorder (Primary)  -     DULoxetine (Cymbalta) 30 MG capsule; Take 1 capsule by mouth 2 (Two) Times a Day.  Dispense: 60 capsule; Refill: 2    2. Mild episode of recurrent major depressive disorder  -     DULoxetine (Cymbalta) 30 MG capsule; Take 1 capsule by mouth 2 (Two) Times a Day.  Dispense: 60 capsule; Refill: 2       Assessment & Plan  1. Anxiety.  She reports a baseline anxiety level of 5 out of 10, with peaks up to 7 out of 10. She experiences frequent crying and mood dysregulation. She has been managing her anxiety without relying heavily on Ativan, using it only once on 03/22/2025. She is advised to continue her current coping strategies and to avoid increasing her reliance on Ativan.    2. Panic attacks.  She has experienced panic attacks, including one on 03/22/2025, for which she used Ativan. She is encouraged to continue finding ways to cope with panic attacks without relying on medication. If panic attacks persist or  worsen, further evaluation and adjustment of her treatment plan may be necessary.    3. Sleep disturbances.  She reports significant sleep disturbances, including sleeping for 22 hours straight and having irregular sleep patterns due to her work schedule. She is advised to maintain a consistent sleep schedule as much as possible and to prioritize sleep hygiene. If sleep issues persist, further evaluation may be needed.        Functional Status: Mild impairment     Prognosis: Good with Ongoing Treatment     Impression/Formulation:  Patient appeared alert and oriented.  Patient is voluntarily requesting to continue outpatient psychiatric treatment at [facility] [address]  Patient is receptive to assistance with maintaining a stable lifestyle.  Patient presents with history of     ICD-10-CM ICD-9-CM   1. Generalized anxiety disorder  F41.1 300.02   2. Mild episode of recurrent major depressive disorder  F33.0 296.31   .    Reviewed patient's previous provider notes. Reviewed most recent labs. Patient meets DSM V diagnostic criteria for diagnoses. Diagnoses may be updated as more information becomes available.       Treatment Plan:   Continue Ativan 0.5 mg PO PRN for panic attacks. Patient has taken one pil since previous visit. No refills needed. Patient and provider discussed risk of long-term use of medication. Patient verbalized understanding.   Increase Cymbalta 30mg PO qd  Follow up scheduled with Vanessa Ayala PsyD for ADHD evaulation and Autism testing for June 30 minutes of supportive psychotherapy continuing efforts to promote the therapeutic alliance, address the patient’s issues, and strengthen self awareness, insights, and coping skills.    Encouraged to continue psychotherapy  Follow-up in 2 months and as needed    Patient will continue supportive psychotherapy efforts and medications as indicated. Clinic will obtain release of information for current treatment team for  continuity of care as needed. Patient will contact this office, call 911 or present to the nearest emergency room should suicidal or homicidal ideations occur.  Discussed medication options and treatment plan of prescribed medication(s) as well as the risks, benefits, and potential side effects. Patient acknowledged and verbally consented to continue with current treatment plan and was educated on the importance of compliance with treatment and follow-up appointments.     Instructed will take 4-6 weeks for full therapeutic effect. Medication risks and side effects discussed with patient including risk for worsening mood, changes in behavior, thoughts of suicide or homicide, induction of fide, serotonin syndrome, rare hyponatremia, rare SIADH, withdrawal symptoms if abrupt withdrawal, sexual dysfunction.   If any thoughts of SI or HI, worsening mood or changes in behavior, call 911 or crisis line [phone], or go to nearest ER at once. Patient agrees to notify close family/friend of new trial of antidepressants/info above. Pt.verbalizes understanding and consents to treatment with this medication.    The patient is being prescribed a controlled substance as part of the treatment plan. The patient has been educated of appropriate use of the medication, including risks and side effects such as somnolence, limited ability to drive and/or work or function safely, potential for dependence, respiratory depression, falls, change in blood pressure, changes in heart rhythm or heart rate, activation of other mental illnesses, and overdose among others. Patient is also informed that the medication is to be used by the patient only, and to avoid any combined use of ETOH, or other substances, with this medication unless prescribed and as directed by a Provider.  The patient verbalized understanding and agreement with this in their own words.    The benefits  of consuming a healthy diet, minimizing caffeine intake and engaging in   daily exercise were discussed with patient. Patient encouraged to consider lifestyle modification regarding diet and exercise patterns to maximize results of mental health treatment.     Quality Measures:   Never smoker    I advised Jeannette Aviles of the risks of tobacco use.     Follow Up:   Return in about 2 months (around 6/2/2025).      Patient or patient representative verbalized consent for the use of Ambient Listening during the visit with  LYNN Berry for chart documentation. 4/2/2025  08:57 EDT    LYNN Berry  [facility] [address]    Answers submitted by the patient for this visit:  Anxiety (Submitted on 3/26/2025)  Chief Complaint: Anxiety  Visit: follow-up  Frequency: constantly  Severity: severe  depressed mood: No  dry mouth: No  excessive worry: Yes  insomnia: Yes  irritability: No  malaise/fatigue: Yes  obsessions: No  Sleep quality: fair  Hours of sleep per night: 8 Hours  Aggravated by: work stress  Medication compliance: %  Side effects: fatigue, nausea, headaches  Additional information: Panic feelings make me physically ill

## 2025-04-04 ENCOUNTER — LAB (OUTPATIENT)
Dept: LAB | Facility: HOSPITAL | Age: 50
End: 2025-04-04
Payer: COMMERCIAL

## 2025-04-04 DIAGNOSIS — E78.01 FAMILIAL HYPERCHOLESTEROLEMIA: ICD-10-CM

## 2025-04-04 DIAGNOSIS — E06.3 HYPOTHYROIDISM DUE TO HASHIMOTO'S THYROIDITIS: ICD-10-CM

## 2025-04-04 DIAGNOSIS — N18.2 STAGE 2 CHRONIC KIDNEY DISEASE: ICD-10-CM

## 2025-04-04 LAB
ALBUMIN SERPL-MCNC: 3.9 G/DL (ref 3.5–5.2)
ALBUMIN/GLOB SERPL: 1 G/DL
ALP SERPL-CCNC: 88 U/L (ref 39–117)
ALT SERPL W P-5'-P-CCNC: 12 U/L (ref 1–33)
ANION GAP SERPL CALCULATED.3IONS-SCNC: 10.1 MMOL/L (ref 5–15)
AST SERPL-CCNC: 20 U/L (ref 1–32)
BILIRUB SERPL-MCNC: 0.3 MG/DL (ref 0–1.2)
BUN SERPL-MCNC: 13 MG/DL (ref 6–20)
BUN/CREAT SERPL: 12.6 (ref 7–25)
CALCIUM SPEC-SCNC: 9.9 MG/DL (ref 8.6–10.5)
CHLORIDE SERPL-SCNC: 105 MMOL/L (ref 98–107)
CHOLEST SERPL-MCNC: 185 MG/DL (ref 0–200)
CO2 SERPL-SCNC: 26.9 MMOL/L (ref 22–29)
CREAT SERPL-MCNC: 1.03 MG/DL (ref 0.57–1)
EGFRCR SERPLBLD CKD-EPI 2021: 66.8 ML/MIN/1.73
GLOBULIN UR ELPH-MCNC: 3.8 GM/DL
GLUCOSE SERPL-MCNC: 90 MG/DL (ref 65–99)
HDLC SERPL QL: 4.2
HDLC SERPL-MCNC: 44 MG/DL (ref 40–60)
LDLC SERPL CALC-MCNC: 114 MG/DL (ref 0–100)
POTASSIUM SERPL-SCNC: 4.2 MMOL/L (ref 3.5–5.2)
PROT SERPL-MCNC: 7.7 G/DL (ref 6–8.5)
SODIUM SERPL-SCNC: 142 MMOL/L (ref 136–145)
TRIGL SERPL-MCNC: 150 MG/DL (ref 0–150)
TSH SERPL DL<=0.05 MIU/L-ACNC: 1.47 UIU/ML (ref 0.27–4.2)
VLDLC SERPL-MCNC: 27 MG/DL (ref 5–40)

## 2025-04-04 PROCEDURE — 80053 COMPREHEN METABOLIC PANEL: CPT

## 2025-04-04 PROCEDURE — 80061 LIPID PANEL: CPT

## 2025-04-04 PROCEDURE — 84443 ASSAY THYROID STIM HORMONE: CPT

## 2025-04-09 ENCOUNTER — OFFICE VISIT (OUTPATIENT)
Dept: FAMILY MEDICINE CLINIC | Facility: CLINIC | Age: 50
End: 2025-04-09
Payer: COMMERCIAL

## 2025-04-09 VITALS
SYSTOLIC BLOOD PRESSURE: 126 MMHG | HEIGHT: 67 IN | DIASTOLIC BLOOD PRESSURE: 84 MMHG | HEART RATE: 75 BPM | WEIGHT: 224.6 LBS | BODY MASS INDEX: 35.25 KG/M2 | OXYGEN SATURATION: 95 %

## 2025-04-09 DIAGNOSIS — I10 PRIMARY HYPERTENSION: ICD-10-CM

## 2025-04-09 DIAGNOSIS — E06.3 HYPOTHYROIDISM DUE TO HASHIMOTO'S THYROIDITIS: ICD-10-CM

## 2025-04-09 DIAGNOSIS — E78.01 FAMILIAL HYPERCHOLESTEROLEMIA: Primary | ICD-10-CM

## 2025-04-09 DIAGNOSIS — N18.2 STAGE 2 CHRONIC KIDNEY DISEASE: ICD-10-CM

## 2025-04-09 DIAGNOSIS — G43.109 MIGRAINE WITH AURA AND WITHOUT STATUS MIGRAINOSUS, NOT INTRACTABLE: ICD-10-CM

## 2025-04-09 NOTE — PROGRESS NOTES
Established Office Visit      Patient Name: Jeannette Aviles  : 1975   MRN: [id number]   Care Team: Patient Care Team:  Adelaide Mc DO as PCP - General (Internal Medicine)  Marialuisa Lu MD as Consulting Physician (Endocrinology)  Rony Goode MD as Consulting Physician (Cardiology)  Adelaide Mc DO as Referring Physician (Internal Medicine)  Mona Banegas PA-C as Physician Assistant (Cardiology)    Chief Complaint:    Chief Complaint   Patient presents with   • Hyperlipidemia       History of Present Illness: Jeannette Aviles is a 50 y.o. female  with anxiety/depression, CKD, dermatitis, hypothyroidism, HTN, migraine without aura, chronic low back pain 2/2 lumbar DDD who is here today to follow up with chronic medical conditions.    Reports compliant with zetia, very happy with improvement in cholesterol and tolerating this medication well.     BP has been well controlled since increasing losartan from 50mg to 100mg daily.     She has been working with her behavioral health provider and therapist for uncontrolled anxiety, depression, work serves a as a major trigger for her and she has discussed accommodations with her providers. 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 Working to get approved for no mandatory overtime, requesting extra unpaid breaks throughout the day - two 15min break and one 30min break and for intermittent absence 1 day/week.     She continues to experience migraines about 2x per month. Only partially responsive to tylenol.     Subjective      Review of Systems:   Review of Systems - See HPI    I have reviewed and the following portions of the patient's history were updated as appropriate: past family history, past medical history, past social history, past surgical history and problem list.    Medications:     Current Outpatient Medications:   •  DULoxetine (Cymbalta) 30 MG capsule, Take 1  "capsule by mouth 2 (Two) Times a Day., Disp: 60 capsule, Rfl: 2  •  ezetimibe (Zetia) 10 MG tablet, Take 1 tablet by mouth Daily., Disp: 30 tablet, Rfl: 5  •  famotidine (PEPCID) 40 MG tablet, Take 1 tablet by mouth twice daily., Disp: 180 tablet, Rfl: 2  •  fexofenadine (ALLEGRA) 180 MG tablet, Take 1 tablet by mouth As Needed., Disp: , Rfl:   •  Levomefolate Glucosamine (Methyl-Folate) 1700 MCG capsule, 15 mg., Disp: , Rfl:   •  levothyroxine (SYNTHROID, LEVOTHROID) 125 MCG tablet, Take 1 tablet by mouth daily., Disp: 90 tablet, Rfl: 2  •  LORazepam (Ativan) 0.5 MG tablet, Take 1 tablet by mouth Daily As Needed for Anxiety., Disp: 5 tablet, Rfl: 0  •  losartan (COZAAR) 100 MG tablet, Take 1 tablet by mouth Daily., Disp: 90 tablet, Rfl: 3  •  vitamin B-12 (CYANOCOBALAMIN) 1000 MCG tablet, Take 1 tablet by mouth Daily., Disp: , Rfl:   •  Vitamin E 180 MG (400 UNIT) capsule capsule, , Disp: , Rfl:   •  rimegepant sulfate ODT (Nurtec) 75 MG disintegrating tablet, Place 1 tablet under the tongue Every Other Day As Needed (migraine)., Disp: 12 tablet, Rfl: 2    Allergies:   Allergies   Allergen Reactions   • Amlodipine Swelling   • Aspartame Headache     migraines   • Atorvastatin Myalgia   • Buspirone Dizziness   • Corticosteroids Hives and Swelling   • Latex Hives       Objective     Physical Exam:  Vital Signs:   Vitals:    04/09/25 1247   BP: 126/84   Pulse: 75   SpO2: 95%   Weight: 102 kg (224 lb 9.6 oz)   Height: 170.2 cm (67.01\")     Body mass index is 35.17 kg/m².     Physical Exam  Vitals reviewed.   Constitutional:       Appearance: Normal appearance. She is not ill-appearing.   Cardiovascular:      Rate and Rhythm: Normal rate.   Pulmonary:      Effort: Pulmonary effort is normal. No respiratory distress.   Skin:     General: Skin is warm and dry.   Neurological:      Mental Status: She is alert.   Psychiatric:         Mood and Affect: Mood normal.         Behavior: Behavior normal.         Judgment: Judgment " normal.         Assessment / Plan      Assessment/Plan:   Problems Addressed This Visit  Diagnoses and all orders for this visit:    1. Familial hypercholesterolemia (Primary)    2. Hypothyroidism due to Hashimoto's thyroiditis    3. Stage 2 chronic kidney disease    4. Primary hypertension    5. Migraine with aura and without status migrainosus, not intractable  -     rimegepant sulfate ODT (Nurtec) 75 MG disintegrating tablet; Place 1 tablet under the tongue Every Other Day As Needed (migraine).  Dispense: 12 tablet; Refill: 2      HTN - well controlled with losartan 100mg daily, continue without changes.   CKD2 - stable, following with nephrology, improvement with medication adjustment and avoiding nephrotoxic agents. Continue hydration.   HLD - Statin intolerance. Started Zetia about 3 months ago, lipid panel significantly improved. Near goal LDL <100.  Continue current zetia and heart healthy diet low in saturated fats, exercise regularly  GERD - continue H2 blocker   Hypothyroidism - well controlled with synthroid, TSH normal 4/2025  Anxiety/depression - Continue Cymbalta, following with behavioral health  Migraines with aura - experiencing 2 episodes per month. Unable to take NSAID due to CKD.  Has failed previous trials with sumatriptan and rizatriptan. Will try nurtec for abortive therapy.     Plan of care reviewed with patient at the conclusion of today's visit. Education was provided regarding diagnosis and management.  Patient verbalizes understanding of and agreement with management plan.    Follow Up:   Return in about 3 months (around 7/9/2025) for Recheck.        DO RADHA Mackay RD  [facility]  [address]  [facility] [address]  Fax [phone]  Phone [phone]

## 2025-04-16 ENCOUNTER — PATIENT MESSAGE (OUTPATIENT)
Dept: FAMILY MEDICINE CLINIC | Facility: CLINIC | Age: 50
End: 2025-04-16
Payer: COMMERCIAL

## 2025-04-16 ENCOUNTER — TELEPHONE (OUTPATIENT)
Dept: FAMILY MEDICINE CLINIC | Facility: CLINIC | Age: 50
End: 2025-04-16
Payer: COMMERCIAL

## 2025-04-16 DIAGNOSIS — G43.109 MIGRAINE WITH AURA AND WITHOUT STATUS MIGRAINOSUS, NOT INTRACTABLE: Primary | ICD-10-CM

## 2025-04-16 NOTE — TELEPHONE ENCOUNTER
Not sure if she has tried this in the past. We do not have citrus allergy on file for patient. Recommend verifying allergy with patient. If she has history of anaphylaxis reaction to citrus, she should not take this med. Otherwise it is reasonable to try and monitor response. Can let me know if she begins to experience symptoms concerning for allergy.

## 2025-04-16 NOTE — TELEPHONE ENCOUNTER
Pharmacy Name:  [facility]     Reference Number (if applicable): [id number]    Pharmacy representative name: PATRICIA     Pharmacy representative phone number: [phone]     What medication are you calling in regards to: rimegepant sulfate ODT (Nurtec) 75 MG disintegrating tablet    What question does the pharmacy have: PATIENT HAS A CITRUS ALLERGY - THIS MEDICATION HAS CITRUS IN IT.  IS THIS OK TO FILL?  HAS PATIENT TRIED THIS MEDICATION BEFORE?      Who is the provider that prescribed the medication: Adelaide Mc DO     Additional notes:  PLEASE CALL AND ADVISE

## 2025-04-24 ENCOUNTER — PRIOR AUTHORIZATION (OUTPATIENT)
Dept: FAMILY MEDICINE CLINIC | Facility: CLINIC | Age: 50
End: 2025-04-24
Payer: COMMERCIAL

## 2025-04-24 NOTE — TELEPHONE ENCOUNTER
(Key: [id number]) - [id number]  Ubrelvy 50MG tablets  status: PA RequestCreated: April 17th, 2025 [phone]: April 24th, 2025

## 2025-04-28 ENCOUNTER — TELEPHONE (OUTPATIENT)
Dept: FAMILY MEDICINE CLINIC | Facility: CLINIC | Age: 50
End: 2025-04-28
Payer: COMMERCIAL

## 2025-04-28 NOTE — TELEPHONE ENCOUNTER
Caller: CONSTANTINE    Relationship to patient:   PA    Best call back number: [phone]    Provider: ELISABETH    Medication PA needed: UBRELVY    Reason for call/Prior Auth: PLEASE CALL. THEY NEED MORE CLINICAL INFO

## 2025-05-20 ENCOUNTER — TELEMEDICINE (OUTPATIENT)
Age: 50
End: 2025-05-20
Payer: COMMERCIAL

## 2025-05-20 DIAGNOSIS — F41.1 GENERALIZED ANXIETY DISORDER: Primary | ICD-10-CM

## 2025-05-20 DIAGNOSIS — F33.0 MILD EPISODE OF RECURRENT MAJOR DEPRESSIVE DISORDER: ICD-10-CM

## 2025-05-20 DIAGNOSIS — F43.21 GRIEF: ICD-10-CM

## 2025-05-20 NOTE — PROGRESS NOTES
Video Visit      Patient Name: Jeannette Aviles  : 1975   MRN: [id number]     Referring Provider: Adelaide Mc DO    Chief Complaint:      ICD-10-CM ICD-9-CM   1. Generalized anxiety disorder  F41.1 300.02   2. Mild episode of recurrent major depressive disorder  F33.0 296.31   3. Grief  F43.21 309.0          History of Present Illness:   Jeannette Aviles is a 50 y.o. female who is being seen by a video visit today for follow up and medication management.           Subjective   Patient Reports:   History of Present Illness  She reports experiencing panic attacks characterized by hyperventilation, which she attributes to potential post-traumatic stress disorder (PTSD) following the discovery of her  mother on 2025. These episodes have been occurring 2 to 3 times daily but have shown a decrease in frequency since her cessation of work. She is currently on a medical leave of absence from her job due to these difficulties and previous difficulties, which began on 2025 or 2025.    She is contemplating participation in an intensive outpatient program for anxiety management, and reports she does not perceive herself as a threat to her own safety or that of others. She has been assigned a  through her insurance and is exploring the possibility of utilizing an out-of-network inpatient program within a 50-mile radius of her location. She is also considering residential treatment options, pending insurance coverage. She is seeking an intensive inpatient program that is not centered around addiction recovery or eating disorders.    She is not typically irritable but admits to feeling overwhelmed and sad.    She has been sleeping excessively, particularly during the first two days following her mother's death, as a coping mechanism. Her  has been ensuring that she maintains adequate nutrition, even when she lacks appetite or forgets to eat.    She has  taken Ativan on two occasions, once during a hyperventilation episode and another time while at work, which she found beneficial. However, she does not wish to become dependent on Ativan for symptom management. She believes she was managing well on duloxetine prior to discontinuation due to kidney-related issues. She expresses a desire for additional therapeutic tools to manage her symptoms without medication reliance. She does not require a refill of Cymbalta at this time. She denies SI/HI/AVH.    She is in talk therapy once per week.    FAMILY HISTORY  Her mother passed away on 2025, possibly due to complications from a procedure related to bladder cancer and a kidney stone.  Her father  13 years ago from a heart attack.    MEDICATIONS  Current: Cymbalta, Ativan    Review of Systems:   Review of Systems   Constitutional:  Negative for appetite change, fatigue and unexpected weight change.   Eyes:  Negative for visual disturbance.   Respiratory:  Negative for chest tightness and shortness of breath.    Cardiovascular:  Negative for chest pain and palpitations.   Gastrointestinal:  Negative for nausea.   Musculoskeletal:  Negative for gait problem.   Skin:  Negative for rash and wound.   Neurological:  Negative for dizziness, tremors, seizures, weakness, light-headedness and headaches.   Psychiatric/Behavioral:  Positive for dysphoric mood. Negative for agitation, behavioral problems, confusion, decreased concentration, hallucinations, self-injury, sleep disturbance and suicidal ideas. The patient is nervous/anxious. The patient is not hyperactive.    Sleep pattern: normal to increased  Appetite: normal        RISK ASSESSMENT:  Patient denies any thoughts of suicide or intent today. Patient denies any suicidal or homicidal ideation today. Patient denies any high risk factors today.     Medications:     Current Outpatient Medications:     DULoxetine (Cymbalta) 30 MG capsule, Take 1 capsule by mouth 2 (Two)  Times a Day., Disp: 60 capsule, Rfl: 2    ezetimibe (Zetia) 10 MG tablet, Take 1 tablet by mouth Daily., Disp: 30 tablet, Rfl: 5    famotidine (PEPCID) 40 MG tablet, Take 1 tablet by mouth twice daily., Disp: 180 tablet, Rfl: 2    fexofenadine (ALLEGRA) 180 MG tablet, Take 1 tablet by mouth As Needed., Disp: , Rfl:     Levomefolate Glucosamine (Methyl-Folate) 1700 MCG capsule, 15 mg., Disp: , Rfl:     levothyroxine (SYNTHROID, LEVOTHROID) 125 MCG tablet, Take 1 tablet by mouth daily., Disp: 90 tablet, Rfl: 2    LORazepam (Ativan) 0.5 MG tablet, Take 1 tablet by mouth Daily As Needed for Anxiety., Disp: 5 tablet, Rfl: 0    losartan (COZAAR) 100 MG tablet, Take 1 tablet by mouth Daily., Disp: 90 tablet, Rfl: 3    ubrogepant (Ubrelvy) 50 MG tablet, Take 1 tablet by mouth Every Other Day As Needed (migraine)., Disp: 12 tablet, Rfl: 2    vitamin B-12 (CYANOCOBALAMIN) 1000 MCG tablet, Take 1 tablet by mouth Daily., Disp: , Rfl:     Vitamin E 180 MG (400 UNIT) capsule capsule, , Disp: , Rfl:     Medication Considerations:  ROB reviewed and appropriate.      Allergies:   Allergies   Allergen Reactions    Amlodipine Swelling    Aspartame Headache     migraines    Atorvastatin Myalgia    Buspirone Dizziness    Corticosteroids Hives and Swelling    Latex Hives    Sucralose Headache       Objective     Physical Exam:  Vital Signs: There were no vitals filed for this visit.  There is no height or weight on file to calculate BMI.   The patient [facility].  Unable to obtain vital signs due to nature of remote visit.  Height stated at 67 inches.  Weight stated at 224 pounds.     Mental Status Exam:   MENTAL STATUS EXAM   General Appearance:  Cleanly groomed and dressed and well developed  Eye Contact:  Good eye contact  Attitude:  Cooperative  Motor Activity:  Other  Other Comment:  [facility] Video visit  Muscle Strength:  Other  Other Comment:  [facility] Video visit  Language:   Spontaneous  Mood and affect:  Depressed and anxious  Hopelessness:  Denies  Loneliness: Denies  Thought Process:  Logical  Associations/ Thought Content:  No delusions  Hallucinations:  None  Suicidal Ideations:  Not present  Homicidal Ideation:  Not present  Sensorium:  Alert and clear  Orientation:  Person, place, time and situation  Immediate Recall, Recent, and Remote Memory:  Intact  Attention Span/ Concentration:  Easily distracted  Fund of Knowledge:  Appropriate for age and educational level  Intellectual Functioning:  Average range  Insight:  Good  Judgement:  Good  Reliability:  Good  Impulse Control:  Fair       @RESULASTCBCDIFFPANEL,TSH,LABLIPI,[id number],YABRZKSD23,MG,FOLATE,PROLACTIN,CRPRESULT,CMP,[id number])@    Lab Results   Component Value Date    GLUCOSE 90 04/04/2025    BUN 13 04/04/2025    CREATININE 1.03 (H) 04/04/2025    EGFR 66.8 04/04/2025    BCR 12.6 04/04/2025    K 4.2 04/04/2025    CO2 26.9 04/04/2025    CALCIUM 9.9 04/04/2025    ALBUMIN 3.9 04/04/2025    BILITOT 0.3 04/04/2025    AST 20 04/04/2025    ALT 12 04/04/2025       Lab Results   Component Value Date    WBC 7.92 12/30/2024    HGB 13.2 12/30/2024    HCT 41.2 12/30/2024    MCV 87.7 12/30/2024     12/30/2024       Lab Results   Component Value Date    CHOL 185 04/04/2025    TRIG 150 04/04/2025    HDL 44 04/04/2025     (H) 04/04/2025       Assessment / Plan      Visit Diagnosis/Orders Placed This Visit:  Diagnoses and all orders for this visit:    1. Generalized anxiety disorder (Primary)    2. Mild episode of recurrent major depressive disorder    3. Grief         Assessment & Plan  1. Anxiety.  She reports experiencing hyperventilating panic attacks, which have improved since taking a medical leave of absence from work. The current dosage of Cymbalta will be maintained at 30 mg daily.     2. Post-Traumatic Stress Disorder (PTSD).  She suspects PTSD due to finding her mother after her passing. She will continue seeing  her therapist once a week for therapy sessions.        Functional Status: Moderate impairment     Prognosis: Good with Ongoing Treatment     Impression/Formulation:  Patient appeared alert and oriented.  Patient is voluntarily requesting to continue outpatient psychiatric treatment at [facility].  Patient is receptive to assistance with maintaining a stable lifestyle.  Patient presents with history of     ICD-10-CM ICD-9-CM   1. Generalized anxiety disorder  F41.1 300.02   2. Mild episode of recurrent major depressive disorder  F33.0 296.31   3. Grief  F43.21 309.0   . Reviewed patient's previous provider notes. Reviewed most recent labs. Patient meets DSM V diagnostic criteria for diagnoses. Diagnoses may be updated as more information becomes available.     Treatment Plan:   Continue Ativan 0.5 mg PO PRN for panic attacks. Patient has taken one pill since previous visit, 2 of 5 tablets total. No refills needed. Patient and provider discussed risk of long-term use of medication. Patient verbalized understanding.   Continue Cymbalta 30mg PO qd   Follow up scheduled with Vanessa Ayala PsyD for ADHD evaulation and Autism testing for June 30 minutes of supportive psychotherapy continuing efforts to promote the therapeutic alliance, address the patient’s issues, and strengthen self awareness, insights, and coping skills.    Encouraged to continue psychotherapy  Follow up in 4 weeks and as needed    Patient will continue supportive psychotherapy efforts and medications as indicated. Clinic will obtain release of information for current treatment team for continuity of care as needed. Patient will contact this office, call 911 or present to the nearest emergency room should suicidal or homicidal ideations occur. Discussed medication options and treatment plan of prescribed medication(s) as well as the risks, benefits, and potential side effects. Patient ackowledged and verbally consented to  continue with current treatment plan and was educated on the importance of compliance with treatment and follow-up appointments.     The patient is being prescribed a controlled substance as part of the treatment plan. The patient has been educated of appropriate use of the medication, including risks and side effects such as somnolence, limited ability to drive and/or work or function safely, potential for dependence, respiratory depression, falls, change in blood pressure, changes in heart rhythm or heart rate, activation of other mental illnesses, and overdose among others. Patient is also informed that the medication is to be used by the patient only, and to avoid any combined use of ETOH, or other substances, with this medication unless prescribed and as directed by a Provider.  The patient verbalized understanding and agreement with this in their own words.    Medication risks and side effects discussed with patient including risk for worsening mood, changes in behavior, thoughts of suicide or homicide, induction of fide, serotonin syndrome, rare hyponatremia, rare SIADH, withdrawal symptoms if abrupt withdrawal, sexual dysfunction.   If any thoughts of SI or HI, worsening mood or changes in behavior, call 911 or crisis line [phone], or go to nearest ER at once. Patient agrees to notify close family/friend of new trial of antidepressants/info above. Pt.verbalizes understanding and consents to treatment with this medication.    Quality Measures:   Never smoker    I advised Jeannette Aviles of the risks of tobacco use.       Follow Up:   Return in about 4 weeks (around 6/17/2025).    Patient or patient representative verbalized consent for the use of Ambient Listening during the visit with  LYNN Berry for chart documentation. 5/20/2025  15:10 EDT    LYNN Berry, [facility] [address]   Answers submitted by the patient for this visit:  Depression (Submitted  on 5/20/2025)  Chief Complaint: Depression  Visit: follow-up  Frequency: constantly  Severity: severe  excessive worry: Yes  insomnia: Yes  irritability: Yes  malaise/fatigue: Yes  obsessions: No  hypersomnia: Yes  difficulty controlling mood: Yes  difficulty staying asleep: No  difficulty falling asleep: Yes  Hours of sleep per night: 10 Hours  Medication compliance: %  Aggravated by: work stress  Additional information: huge increase in the past month/ traumatic death in family

## 2025-05-24 DIAGNOSIS — F41.1 GENERALIZED ANXIETY DISORDER: ICD-10-CM

## 2025-05-24 DIAGNOSIS — F33.0 MILD EPISODE OF RECURRENT MAJOR DEPRESSIVE DISORDER: ICD-10-CM

## 2025-05-27 RX ORDER — DULOXETIN HYDROCHLORIDE 30 MG/1
30 CAPSULE, DELAYED RELEASE ORAL 2 TIMES DAILY
Qty: 60 CAPSULE | Refills: 1 | Status: SHIPPED | OUTPATIENT
Start: 2025-05-27

## 2025-05-29 ENCOUNTER — TRANSCRIBE ORDERS (OUTPATIENT)
Dept: LAB | Facility: HOSPITAL | Age: 50
End: 2025-05-29
Payer: COMMERCIAL

## 2025-05-29 ENCOUNTER — LAB (OUTPATIENT)
Dept: LAB | Facility: HOSPITAL | Age: 50
End: 2025-05-29
Payer: COMMERCIAL

## 2025-05-29 DIAGNOSIS — N18.31 STAGE 3A CHRONIC KIDNEY DISEASE: ICD-10-CM

## 2025-05-29 DIAGNOSIS — N18.31 STAGE 3A CHRONIC KIDNEY DISEASE: Primary | ICD-10-CM

## 2025-05-29 LAB
ALBUMIN SERPL-MCNC: 4.2 G/DL (ref 3.5–5.2)
ANION GAP SERPL CALCULATED.3IONS-SCNC: 11 MMOL/L (ref 5–15)
BACTERIA UR QL AUTO: ABNORMAL /HPF
BILIRUB UR QL STRIP: NEGATIVE
BUN SERPL-MCNC: 14 MG/DL (ref 6–20)
BUN/CREAT SERPL: 11.9 (ref 7–25)
CALCIUM SPEC-SCNC: 9.7 MG/DL (ref 8.6–10.5)
CHLORIDE SERPL-SCNC: 103 MMOL/L (ref 98–107)
CLARITY UR: ABNORMAL
CO2 SERPL-SCNC: 27 MMOL/L (ref 22–29)
COLOR UR: ABNORMAL
CREAT SERPL-MCNC: 1.18 MG/DL (ref 0.57–1)
CREAT UR-MCNC: 337.2 MG/DL
DEPRECATED RDW RBC AUTO: 38.4 FL (ref 37–54)
EGFRCR SERPLBLD CKD-EPI 2021: 56.7 ML/MIN/1.73
ERYTHROCYTE [DISTWIDTH] IN BLOOD BY AUTOMATED COUNT: 12.7 % (ref 12.3–15.4)
GLUCOSE SERPL-MCNC: 111 MG/DL (ref 65–99)
GLUCOSE UR STRIP-MCNC: NEGATIVE MG/DL
HCT VFR BLD AUTO: 42.1 % (ref 34–46.6)
HGB BLD-MCNC: 14.5 G/DL (ref 12–15.9)
HGB UR QL STRIP.AUTO: NEGATIVE
HYALINE CASTS UR QL AUTO: ABNORMAL /LPF
KETONES UR QL STRIP: NEGATIVE
LEUKOCYTE ESTERASE UR QL STRIP.AUTO: ABNORMAL
MCH RBC QN AUTO: 29.2 PG (ref 26.6–33)
MCHC RBC AUTO-ENTMCNC: 34.4 G/DL (ref 31.5–35.7)
MCV RBC AUTO: 84.7 FL (ref 79–97)
NITRITE UR QL STRIP: NEGATIVE
PH UR STRIP.AUTO: <=5 [PH] (ref 5–8)
PHOSPHATE SERPL-MCNC: 4.6 MG/DL (ref 2.5–4.5)
PLATELET # BLD AUTO: 226 10*3/MM3 (ref 140–450)
PMV BLD AUTO: 10.4 FL (ref 6–12)
POTASSIUM SERPL-SCNC: 3.7 MMOL/L (ref 3.5–5.2)
PROT ?TM UR-MCNC: 17.1 MG/DL
PROT UR QL STRIP: ABNORMAL
PROT/CREAT UR: 50.7 MG/G CREA (ref 0–200)
RBC # BLD AUTO: 4.97 10*6/MM3 (ref 3.77–5.28)
RBC # UR STRIP: ABNORMAL /HPF
REF LAB TEST METHOD: ABNORMAL
SODIUM SERPL-SCNC: 141 MMOL/L (ref 136–145)
SP GR UR STRIP: >1.03 (ref 1–1.03)
SQUAMOUS #/AREA URNS HPF: ABNORMAL /HPF
UROBILINOGEN UR QL STRIP: ABNORMAL
WBC # UR STRIP: ABNORMAL /HPF
WBC NRBC COR # BLD AUTO: 7.54 10*3/MM3 (ref 3.4–10.8)

## 2025-05-29 PROCEDURE — 82570 ASSAY OF URINE CREATININE: CPT

## 2025-05-29 PROCEDURE — 36415 COLL VENOUS BLD VENIPUNCTURE: CPT

## 2025-05-29 PROCEDURE — 81001 URINALYSIS AUTO W/SCOPE: CPT

## 2025-05-29 PROCEDURE — 85027 COMPLETE CBC AUTOMATED: CPT

## 2025-05-29 PROCEDURE — 80069 RENAL FUNCTION PANEL: CPT

## 2025-05-29 PROCEDURE — 84156 ASSAY OF PROTEIN URINE: CPT

## 2025-06-13 DIAGNOSIS — E78.01 FAMILIAL HYPERCHOLESTEROLEMIA: ICD-10-CM

## 2025-06-16 RX ORDER — EZETIMIBE 10 MG/1
10 TABLET ORAL DAILY
Qty: 90 TABLET | Refills: 0 | Status: SHIPPED | OUTPATIENT
Start: 2025-06-16

## 2025-06-17 ENCOUNTER — TELEMEDICINE (OUTPATIENT)
Age: 50
End: 2025-06-17
Payer: COMMERCIAL

## 2025-06-17 DIAGNOSIS — F43.21 GRIEF: ICD-10-CM

## 2025-06-17 DIAGNOSIS — F33.0 MILD EPISODE OF RECURRENT MAJOR DEPRESSIVE DISORDER: ICD-10-CM

## 2025-06-17 DIAGNOSIS — F41.1 GENERALIZED ANXIETY DISORDER: Primary | ICD-10-CM

## 2025-06-17 NOTE — PROGRESS NOTES
Video Visit      Patient Name: Jeannette Aviles  : 1975   MRN: [id number]     Referring Provider: Adelaide Mc DO    Chief Complaint:      ICD-10-CM ICD-9-CM   1. Generalized anxiety disorder  F41.1 300.02   2. Mild episode of recurrent major depressive disorder  F33.0 296.31   3. Grief  F43.21 309.0          History of Present Illness:   Jeannette Aviles is a 50 y.o. female who is being seen by a video visit today for follow up and medication management.           Subjective   Patient Reports:   History of Present Illness  She reports a satisfactory response to her current medication regimen, with no recent changes. Her anxiety levels have been manageable, rating it at 3 or 4 on a scale of 10. She has not experienced any panic attacks. She has been using a vagus nerve stimulator since the beginning of the month, which she believes has been beneficial. Initially, it induced fatigue and sleepiness, but she now feels it is helping her cope with overwhelming situations. She has been unable to find an inpatient program within her insurance network in the state and was considering an intensive outpatient program. However, she has increased her therapy sessions to twice a week, which she finds helpful. She has developed a structured routine at home, incorporating activities such as painting by numbers, reading about anxiety and DBT, stretching, and physical activity. She is currently not working and plans to gradually reintroduce work into her routine once she feels comfortable. She reports good sleep quality and consistency, although she had difficulty falling asleep last night. She also reports a good appetite and no thoughts of suicide or self-harm. She does not endorse feelings of hopelessness or loneliness. She is planning a trip to visit her aunt in [place] on Thursday.    MEDICATIONS  Cymbalta      Review of Systems:   Review of Systems   Constitutional:  Negative for appetite  change, fatigue and unexpected weight change.   Eyes:  Negative for visual disturbance.   Respiratory:  Negative for chest tightness and shortness of breath.    Cardiovascular:  Negative for chest pain and palpitations.   Gastrointestinal:  Negative for nausea.   Musculoskeletal:  Negative for gait problem.   Skin:  Negative for rash and wound.   Neurological:  Negative for dizziness, tremors, seizures, weakness, light-headedness and headaches.   Psychiatric/Behavioral:  Negative for agitation, behavioral problems, confusion, decreased concentration, dysphoric mood, hallucinations, self-injury, sleep disturbance and suicidal ideas. The patient is not nervous/anxious and is not hyperactive.    Sleep pattern: 6-8 hours per night   Appetite: normal       RISK ASSESSMENT:  Patient denies any thoughts of suicide or intent today. Patient denies any suicidal or homicidal ideation today. Patient denies any high risk factors today.     Medications:     Current Outpatient Medications:     DULoxetine (CYMBALTA) 30 MG capsule, Take 1 capsule by mouth twice daily., Disp: 60 capsule, Rfl: 1    ezetimibe (ZETIA) 10 MG tablet, Take 1 tablet by mouth daily., Disp: 90 tablet, Rfl: 0    famotidine (PEPCID) 40 MG tablet, Take 1 tablet by mouth twice daily., Disp: 180 tablet, Rfl: 2    fexofenadine (ALLEGRA) 180 MG tablet, Take 1 tablet by mouth As Needed., Disp: , Rfl:     Levomefolate Glucosamine (Methyl-Folate) 1700 MCG capsule, 15 mg., Disp: , Rfl:     levothyroxine (SYNTHROID, LEVOTHROID) 125 MCG tablet, Take 1 tablet by mouth daily., Disp: 90 tablet, Rfl: 2    LORazepam (Ativan) 0.5 MG tablet, Take 1 tablet by mouth Daily As Needed for Anxiety., Disp: 5 tablet, Rfl: 0    losartan (COZAAR) 100 MG tablet, Take 1 tablet by mouth Daily., Disp: 90 tablet, Rfl: 3    ubrogepant (Ubrelvy) 50 MG tablet, Take 1 tablet by mouth Every Other Day As Needed (migraine)., Disp: 12 tablet, Rfl: 2    vitamin B-12 (CYANOCOBALAMIN) 1000 MCG tablet,  Take 1 tablet by mouth Daily., Disp: , Rfl:     Vitamin E 180 MG (400 UNIT) capsule capsule, , Disp: , Rfl:     Medication Considerations:  ROB reviewed and appropriate.      Allergies:   Allergies   Allergen Reactions    Amlodipine Swelling    Aspartame Headache     migraines    Atorvastatin Myalgia    Buspirone Dizziness    Corticosteroids Hives and Swelling    Latex Hives    Sucralose Headache       Objective     Physical Exam:  Vital Signs: There were no vitals filed for this visit.  There is no height or weight on file to calculate BMI.   The patient was seen remotely today via a MyChart Video Visit through [facility].  Unable to obtain vital signs due to nature of remote visit.  Height stated at 67 inches.  Weight stated at 220 pounds.     Mental Status Exam:   MENTAL STATUS EXAM   General Appearance:  Cleanly groomed and dressed and well developed  Eye Contact:  Good eye contact  Attitude:  Cooperative  Motor Activity:  Other  Other Comment:  [facility] Video visit  Muscle Strength:  Other  Other Comment:  [facility] Video visit  Speech:  Normal rate, tone, volume  Language:  Spontaneous  Mood and affect:  Normal, pleasant  Hopelessness:  Denies  Loneliness: Denies  Thought Process:  Logical  Associations/ Thought Content:  No delusions  Hallucinations:  None  Suicidal Ideations:  Not present  Homicidal Ideation:  Not present  Sensorium:  Alert and clear  Orientation:  Person, place, time and situation  Immediate Recall, Recent, and Remote Memory:  Intact  Attention Span/ Concentration:  Easily distracted  Fund of Knowledge:  Appropriate for age and educational level  Intellectual Functioning:  Average range  Insight:  Good  Judgement:  Good  Reliability:  Good  Impulse Control:  Fair       @RESULASTCBCDIFFPANEL,TSH,LABLIPI,[id number],ZXRIZBSX83,MG,FOLATE,PROLACTIN,CRPRESULT,CMP,[id number])@    Lab Results   Component Value Date    GLUCOSE 111 (H) 05/29/2025    BUN 14.0 05/29/2025    CREATININE 1.18 (H) 05/29/2025    EGFR  56.7 (L) 05/29/2025    BCR 11.9 05/29/2025    K 3.7 05/29/2025    CO2 27.0 05/29/2025    CALCIUM 9.7 05/29/2025    ALBUMIN 4.2 05/29/2025    BILITOT 0.3 04/04/2025    AST 20 04/04/2025    ALT 12 04/04/2025       Lab Results   Component Value Date    WBC 7.54 05/29/2025    HGB 14.5 05/29/2025    HCT 42.1 05/29/2025    MCV 84.7 05/29/2025     05/29/2025       Lab Results   Component Value Date    CHOL 185 04/04/2025    TRIG 150 04/04/2025    HDL 44 04/04/2025     (H) 04/04/2025       Assessment / Plan      Visit Diagnosis/Orders Placed This Visit:  Diagnoses and all orders for this visit:    1. Generalized anxiety disorder (Primary)    2. Mild episode of recurrent major depressive disorder    3. Grief       Assessment & Plan  1. Anxiety.  Her anxiety levels have shown significant improvement, currently rated at 3-4 out of 10. She has not experienced any panic attacks. She is advised to continue her current medication regimen. The use of a vagus nerve stimulator has been beneficial. She is encouraged to maintain her structured routine, incorporating relaxation and meditation techniques. She is also advised to allocate personal time during her upcoming visit to her aunt's place.       Functional Status: Moderate impairment     Prognosis: Good with Ongoing Treatment     Impression/Formulation:  Patient appeared alert and oriented.  Patient is voluntarily requesting to continue outpatient psychiatric treatment at [facility].  Patient is receptive to assistance with maintaining a stable lifestyle.  Patient presents with history of     ICD-10-CM ICD-9-CM   1. Generalized anxiety disorder  F41.1 300.02   2. Mild episode of recurrent major depressive disorder  F33.0 296.31   3. Grief  F43.21 309.0   . Reviewed patient's previous provider notes. Reviewed most recent labs. Patient meets DSM V diagnostic criteria for diagnoses. Diagnoses may be updated as more information becomes  available.     Treatment Plan:   Continue Cymbalta 30mg PO qd   Continue Ativan 0.5 mg PO PRN for panic attacks. Patient has taken 2 of 5 tablets since prescribed on 2/20. No refills needed. Patient and provider discussed risk of long-term use of medication. Patient verbalized understanding.   Controlled Substance agreement sent via [place]   Follow up scheduled with Vanessa Ayala PsyD for ADHD evaulation and Autism testing for June 30  30 minutes of supportive psychotherapy continuing efforts to promote the therapeutic alliance, address the patient’s issues, and strengthen self awareness, insights, and coping skills.   Encouraged to continue psychotherapy  Follow up in 4 weeks and as needed. Patient request.     Patient will continue supportive psychotherapy efforts and medications as indicated. Clinic will obtain release of information for current treatment team for continuity of care as needed. Patient will contact this office, call 911 or present to the nearest emergency room should suicidal or homicidal ideations occur. Discussed medication options and treatment plan of prescribed medication(s) as well as the risks, benefits, and potential side effects. Patient ackowledged and verbally consented to continue with current treatment plan and was educated on the importance of compliance with treatment and follow-up appointments.     Medication risks and side effects discussed with patient including risk for worsening mood, changes in behavior, thoughts of suicide or homicide, induction of fide, serotonin syndrome, rare hyponatremia, rare SIADH, withdrawal symptoms if abrupt withdrawal, sexual dysfunction.   If any thoughts of SI or HI, worsening mood or changes in behavior, call 911 or crisis line [phone], or go to nearest ER at once. Patient agrees to notify close family/friend of new trial of antidepressants/info above. Pt.verbalizes understanding and consents to treatment with this medication.    The patient is  being prescribed a controlled substance as part of the treatment plan. The patient has been educated of appropriate use of the medication, including risks and side effects such as somnolence, limited ability to drive and/or work or function safely, potential for dependence, respiratory depression, falls, change in blood pressure, changes in heart rhythm or heart rate, activation of other mental illnesses, and overdose among others. Patient is also informed that the medication is to be used by the patient only, and to avoid any combined use of ETOH, or other substances, with this medication unless prescribed and as directed by a Provider.  The patient verbalized understanding and agreement with this in their own words.      Quality Measures:   Never smoker    I advised Jeannette Aviles of the risks of tobacco use.       Follow Up:   Return in about 4 weeks (around 7/15/2025).    Patient or patient representative verbalized consent for the use of Ambient Listening during the visit with  LYNN Berry for chart documentation. 6/18/2025  15:38 EDT    LYNN Berry, PMHNP-[facility] [address]

## 2025-06-26 RX ORDER — LANOLIN ALCOHOL/MO/W.PET/CERES
1000 CREAM (GRAM) TOPICAL DAILY
Qty: 90 TABLET | Refills: 1 | Status: SHIPPED | OUTPATIENT
Start: 2025-06-26

## 2025-06-26 RX ORDER — LANOLIN ALCOHOL/MO/W.PET/CERES
1000 CREAM (GRAM) TOPICAL DAILY
Qty: 90 TABLET | Refills: 2 | OUTPATIENT
Start: 2025-06-26

## 2025-06-26 NOTE — TELEPHONE ENCOUNTER
Rx Refill Note  Requested Prescriptions     Pending Prescriptions Disp Refills    vitamin B-12 (CYANOCOBALAMIN) 1000 MCG tablet [Pharmacy Med Name: VITAMIN B12 1000 MCG TAB] 90 tablet 2     Sig: Take 1 tablet by mouth daily.      Last office visit with prescribing clinician: 4/9/2025   Last telemedicine visit with prescribing clinician: Visit date not found   Next office visit with prescribing clinician: 7/11/2025                         Would you like a call back once the refill request has been completed: [] Yes [] No    If the office needs to give you a call back, can they leave a voicemail: [] Yes [] No    Nicole Flaherty MA  06/26/25, 08:51 EDT

## 2025-06-26 NOTE — TELEPHONE ENCOUNTER
Rx Refill Note  Requested Prescriptions     Pending Prescriptions Disp Refills    vitamin B-12 (CYANOCOBALAMIN) 1000 MCG tablet       Sig: Take 1 tablet by mouth Daily.      Last office visit with prescribing clinician: 4/9/2025   Last telemedicine visit with prescribing clinician: Visit date not found   Next office visit with prescribing clinician: 6/26/2025                         Would you like a call back once the refill request has been completed: [] Yes [] No    If the office needs to give you a call back, can they leave a voicemail: [] Yes [] No    Nicloe Flaherty MA  06/26/25, 08:50 EDT

## 2025-06-27 ENCOUNTER — OFFICE VISIT (OUTPATIENT)
Age: 50
End: 2025-06-27
Payer: COMMERCIAL

## 2025-06-27 DIAGNOSIS — F89 DEVELOPMENTAL DISORDER: Primary | ICD-10-CM

## 2025-06-27 NOTE — TREATMENT PLAN
The patient will engage in psychological testing to explore diagnoses. The patient will attend the follow-up appointment to review the results of the assessment.     Clinician will utilize psychological tests and reports to explore symptomology, will integrate findings, and will complete a psychological report for the patient with diagnostic conclusions and recommendations.        I have discussed and reviewed this treatment plan with the patient.

## 2025-06-27 NOTE — PROGRESS NOTES
Follow Up Note     Date:2025   Client Name: Jeannette Aviles  : 1975   MRN: [id number]   Time IN: 7:41 AM    Time OUT: 11:11 AM     Referring Provider: Adelaide Mc DO    Chief Complaint:      ICD-10-CM ICD-9-CM   1. Developmental disorder  F89 315.9        History of Present Illness:   Jeannette Aviles is a 50 y.o. female who presents for psychosocial assessment related to Attention Deficit/Hyperactivity Disorder and Autism Spectrum Disorder.    Provisional Diagnoses:  Autism Spectrum Disorder  Other Specified Attention Deficit/Hyperactivity Disorder    Objective     Mental Status Exam:   MENTAL STATUS EXAM   General Appearance:  Cleanly groomed and dressed and well developed  Eye Contact:  Poor eye contact  Attitude:  Cooperative, polite and candid  Motor Activity:  Normal gait, posture, fidgety, foot tapping and hyperactive  Muscle Strength:  Normal  Speech:  Monotone, hyper talkative and normal rate, tone, volume  Language:  Spontaneous and stereotypical  Mood and affect:  Normal, pleasant and flat  Thought Process:  Logical and goal-directed  Associations/ Thought Content:  No delusions  Hallucinations:  None  Suicidal Ideations:  Not present  Homicidal Ideation:  Not present  Sensorium:  Alert and clear  Orientation:  Person, place, time and situation  Immediate Recall, Recent, and Remote Memory:  Intact  Attention Span/ Concentration:  Easily distracted  Fund of Knowledge:  Appropriate for age and educational level  Intellectual Functioning:  Average range  Insight:  Good  Judgement:  Good  Reliability:  Good  Impulse Control:  Fair     SUICIDE RISK ASSESSMENT/CSSRS:  1. Does client have thoughts of suicide? no  2. Does client have intent for suicide? no  3. Does client have a current plan for suicide? no  4. History of suicide attempts: no  5. Family history of suicide or attempts: no  6. History of violent behaviors towards others or property or thoughts of committing  suicide: yes  She has had suicidal thoughts in the past but not currently and has never had a plan or attempt.      7. History of sexual aggression toward others: no  8. Access to firearms or weapons: no    Subjective     PHQ-9 Depression Screening  PHQ-9 Total Score: 8       CASSANDRA-7  Feeling nervous, anxious or on edge: More than half the days  Not being able to stop or control worrying: More than half the days  Worrying too much about different things: More than half the days  Trouble Relaxing: More than half the days  Being so restless that it is hard to sit still: More than half the days  Feeling afraid as if something awful might happen: Not at all  Becoming easily annoyed or irritable: Several days  CASSANDRA 7 Total Score: 11  If you checked any problems, how difficult have these problems made it for you to do your work, take care of things at home, or get along with other people: Somewhat difficult    Current Outpatient Medications:     DULoxetine (CYMBALTA) 30 MG capsule, Take 1 capsule by mouth twice daily., Disp: 60 capsule, Rfl: 1    ezetimibe (ZETIA) 10 MG tablet, Take 1 tablet by mouth daily., Disp: 90 tablet, Rfl: 0    famotidine (PEPCID) 40 MG tablet, Take 1 tablet by mouth twice daily., Disp: 180 tablet, Rfl: 2    fexofenadine (ALLEGRA) 180 MG tablet, Take 1 tablet by mouth As Needed., Disp: , Rfl:     Levomefolate Glucosamine (Methyl-Folate) 1700 MCG capsule, 15 mg., Disp: , Rfl:     levothyroxine (SYNTHROID, LEVOTHROID) 125 MCG tablet, Take 1 tablet by mouth daily., Disp: 90 tablet, Rfl: 2    LORazepam (Ativan) 0.5 MG tablet, Take 1 tablet by mouth Daily As Needed for Anxiety., Disp: 5 tablet, Rfl: 0    losartan (COZAAR) 100 MG tablet, Take 1 tablet by mouth Daily., Disp: 90 tablet, Rfl: 3    ubrogepant (Ubrelvy) 50 MG tablet, Take 1 tablet by mouth Every Other Day As Needed (migraine)., Disp: 12 tablet, Rfl: 2    vitamin B-12 (CYANOCOBALAMIN) 1000 MCG tablet, Take 1 tablet by mouth Daily., Disp: 90  tablet, Rfl: 1    Vitamin E 180 MG (400 UNIT) capsule capsule, , Disp: , Rfl:     Allergies:   Allergies   Allergen Reactions    Amlodipine Swelling    Aspartame Headache     migraines    Atorvastatin Myalgia    Buspirone Dizziness    Corticosteroids Hives and Swelling    Latex Hives    Sucralose Headache       Assessment / Plan / Progress     Visit Diagnosis/Orders Placed This Visit:    ICD-10-CM ICD-9-CM   1. Developmental disorder  F89 315.9        PLAN:  Safety: No acute safety concerns  Risk Assessment: Risk of self-harm acutely is low. Risk of self-harm chronically is also low, but could be further elevated in the event of treatment noncompliance and/or AODA.    Treatment Plan: Continue supportive psychotherapy efforts and medications as indicated. Obtain release of information for current treatment plan for continuity of care as needed. Patient will adhere to medication regimen as prescribed and report any side effects. Patient seems reasonably able to adhere to treatment plan. Patient will contact this office, call 911 or present to the nearest emergency room should suicidal or homicidal ideations occur.    Short Term Goals: Patient will be compliant with medication, patient will have no significant medication related side effects. Patient will be engaged in psychotherapy as indicated.  Patient will report subjective improvement of symptoms.    Long Term Goals: To stabilize mood and treat/improve subjective symptoms, the patient will stay out of the hospital, the patient will be at an optimal level of functioning, and the patient will take all medications as prescribed.The patient verbalized understanding and agreement with goals that were mutually set.    Active Symptoms:  The patient reported feeling positive since the previous session.    Progress Note:  The clinician met with patient in office today. The clinician conducted a brief check in with patient regarding mood, recent psychosocial events, and  stressors.  The clinician continuously assessed for environmental and psychological safety. Today session focused on completing the administration of the CAARS-2 (self report), MMPI 3, GAD7, OCI-R, PHQ9, diagnostic interview, clinical observation, ABAS-3, and SCAARED. The clinician will score and interpret information from this session and the previous session. The clinician will create a psychosocial report. The clinician used motivational interview, reflective listening, and elements of CBT including psychoeducation through out session today. Patient received a provisional diagnosis of ADHD, combined presentation and Autism Spectrum Disorder. The patient's diagnoses will be updated following the completion of the psychosocial report.    Allowed client to freely discuss issues  without interruption or judgement with unconditional positive regard, active listening skills, and empathy. Clinician provided a safe, confidential environment to facilitate the development of a positive therapeutic relationship and encouraged open, honest communication. Assisted client in identifying risk factors which would indicate the need for higher level of care including thoughts to harm self or others and/or self-harming behavior and encouraged client to contact this office, call 911, or present to the nearest emergency room should any of these events occur. Discussed crisis intervention services and means to access. Assisted client in processing session content; acknowledged and normalized client’s thoughts, feelings, and concerns by utilizing a person-centered approach in efforts to build appropriate rapport and a positive therapeutic relationship with open and honest communication.     Patient Response:  Ms. Aviles responded positively as evidenced by her active engagement and participation.      Crisis Plan:  If symptoms/behaviors persist, patient will present to the nearest hospital for an assessment. Advised patient of [facility] 24/7 assessment services.     Quality Measures:     TOBACCO USE:  Tobacco Use: Low Risk  (6/27/2025)    Patient History     Smoking Tobacco Use: Never     Smokeless Tobacco Use: Never     Passive Exposure: Not on file      Never smoker    I advised Jeannette of the risks of tobacco use.     Follow Up:   Return in about 3 months (around 9/27/2025) for Next scheduled follow up.    Vanessa Ayala PsyD  [facility]     REPORT WILL FOLLOW IN NEXT ENCOUNTER

## 2025-07-11 ENCOUNTER — OFFICE VISIT (OUTPATIENT)
Dept: FAMILY MEDICINE CLINIC | Facility: CLINIC | Age: 50
End: 2025-07-11
Payer: COMMERCIAL

## 2025-07-11 ENCOUNTER — LAB (OUTPATIENT)
Dept: LAB | Facility: HOSPITAL | Age: 50
End: 2025-07-11
Payer: COMMERCIAL

## 2025-07-11 VITALS
BODY MASS INDEX: 35.5 KG/M2 | HEART RATE: 84 BPM | SYSTOLIC BLOOD PRESSURE: 140 MMHG | DIASTOLIC BLOOD PRESSURE: 90 MMHG | WEIGHT: 226.2 LBS | OXYGEN SATURATION: 98 % | HEIGHT: 67 IN

## 2025-07-11 DIAGNOSIS — F90.9 ATTENTION DEFICIT HYPERACTIVITY DISORDER (ADHD), UNSPECIFIED ADHD TYPE: ICD-10-CM

## 2025-07-11 DIAGNOSIS — F41.9 ANXIETY: ICD-10-CM

## 2025-07-11 DIAGNOSIS — M77.8 TENDINITIS OF FINGER OF RIGHT HAND: ICD-10-CM

## 2025-07-11 DIAGNOSIS — Z82.69 FAMILY HISTORY OF SYSTEMIC LUPUS ERYTHEMATOSUS: ICD-10-CM

## 2025-07-11 DIAGNOSIS — M25.50 POLYARTHRALGIA: ICD-10-CM

## 2025-07-11 DIAGNOSIS — M25.642 STIFFNESS OF JOINTS OF BOTH HANDS: ICD-10-CM

## 2025-07-11 DIAGNOSIS — Z78.0 MENOPAUSE: ICD-10-CM

## 2025-07-11 DIAGNOSIS — I10 PRIMARY HYPERTENSION: Primary | ICD-10-CM

## 2025-07-11 DIAGNOSIS — Z82.61 FAMILY HISTORY OF RHEUMATOID ARTHRITIS: ICD-10-CM

## 2025-07-11 DIAGNOSIS — M25.641 STIFFNESS OF JOINTS OF BOTH HANDS: ICD-10-CM

## 2025-07-11 DIAGNOSIS — E78.01 FAMILIAL HYPERCHOLESTEROLEMIA: ICD-10-CM

## 2025-07-11 DIAGNOSIS — L98.7 EXCESS SKIN OF ABDOMEN: ICD-10-CM

## 2025-07-11 DIAGNOSIS — E06.3 HYPOTHYROIDISM DUE TO HASHIMOTO'S THYROIDITIS: ICD-10-CM

## 2025-07-11 LAB — CHROMATIN AB SERPL-ACNC: <10 IU/ML (ref 0–14)

## 2025-07-11 PROCEDURE — 86431 RHEUMATOID FACTOR QUANT: CPT

## 2025-07-11 PROCEDURE — 86038 ANTINUCLEAR ANTIBODIES: CPT

## 2025-07-11 PROCEDURE — 86200 CCP ANTIBODY: CPT

## 2025-07-11 NOTE — PROGRESS NOTES
Established Office Visit      Patient Name: Jeannette Aviles  : 1975   MRN: [id number]   Care Team: Patient Care Team:  Adelaide Mc DO as PCP - General (Internal Medicine)  Marialuisa Lu MD as Consulting Physician (Endocrinology)  Rony Goode MD as Consulting Physician (Cardiology)  Adelaide Mc DO as Referring Physician (Internal Medicine)  Mona Banegas PA-C as Physician Assistant (Cardiology)    Chief Complaint:    Chief Complaint   Patient presents with   • Hyperlipidemia   • Joint Pain     Increased pain   • ADHD     diagnosis       History of Present Illness: Jeannette Aviles is a 50 y.o. female with anxiety/depression, CKD, dermatitis, hypothyroidism, HTN, migraine without aura, chronic low back pain 2/2 lumbar DDD who is here today to follow up with concerns as detailed below.      Since I have last seen her she has established with behavioral health and has been diagnosed with Autism and ADHD. CASSANDRA - compliant with cymbalta but she did not realize her rx was written for twice daily rather than once daily.   She is following with talk therapy twice weekly and this has been very helpful       HTN - elevated today. Compliant with losartan 100mg daily. Reports home readings are typically 120-130s/80s    She reports hitting menopause around . She is interested in considering HRT. Feels it may help with her anxiety, irritability, inability to concentrate, brain fog, heat intolerance. Denies drenching night sweats.     She reports right index finger is stiff, difficulty making a fist. Painful at times at the base of the finger. Has noticed intermittent joint swelling. This has been ongoing for months.   Reports  similar stiffness in her left index finger but to a much more mild degree.     Mother has history of SLE and RA     Requests referral to plastics to consider excess skin removal from abdomen. Folds are irritating, sometimes developing boils. None  "present today.    Subjective      Review of Systems:   Review of Systems - See HPI    I have reviewed and the following portions of the patient's history were updated as appropriate: past family history, past medical history, past social history, past surgical history and problem list.    Medications:     Current Outpatient Medications:   •  DULoxetine (CYMBALTA) 30 MG capsule, Take 1 capsule by mouth twice daily., Disp: 60 capsule, Rfl: 1  •  ezetimibe (ZETIA) 10 MG tablet, Take 1 tablet by mouth daily., Disp: 90 tablet, Rfl: 0  •  famotidine (PEPCID) 40 MG tablet, Take 1 tablet by mouth twice daily., Disp: 180 tablet, Rfl: 2  •  fexofenadine (ALLEGRA) 180 MG tablet, Take 1 tablet by mouth As Needed., Disp: , Rfl:   •  Levomefolate Glucosamine (Methyl-Folate) 1700 MCG capsule, 15 mg., Disp: , Rfl:   •  levothyroxine (SYNTHROID, LEVOTHROID) 125 MCG tablet, Take 1 tablet by mouth daily., Disp: 90 tablet, Rfl: 2  •  LORazepam (Ativan) 0.5 MG tablet, Take 1 tablet by mouth Daily As Needed for Anxiety., Disp: 5 tablet, Rfl: 0  •  losartan (COZAAR) 100 MG tablet, Take 1 tablet by mouth Daily., Disp: 90 tablet, Rfl: 3  •  ubrogepant (Ubrelvy) 50 MG tablet, Take 1 tablet by mouth Every Other Day As Needed (migraine)., Disp: 12 tablet, Rfl: 2  •  vitamin B-12 (CYANOCOBALAMIN) 1000 MCG tablet, Take 1 tablet by mouth Daily., Disp: 90 tablet, Rfl: 1  •  Vitamin E 180 MG (400 UNIT) capsule capsule, , Disp: , Rfl:     Allergies:   Allergies   Allergen Reactions   • Amlodipine Swelling   • Aspartame Headache     migraines   • Atorvastatin Myalgia   • Buspirone Dizziness   • Corticosteroids Hives and Swelling   • Latex Hives   • Sucralose Headache   • Tramadol Dizziness       Objective     Physical Exam:  Vital Signs:   Vitals:    07/11/25 1325   BP: 140/90   Pulse: 84   SpO2: 98%   Weight: 103 kg (226 lb 3.2 oz)   Height: 170.2 cm (67.01\")     Body mass index is 35.42 kg/m².     Physical Exam  Vitals reviewed.   Constitutional:  "      Appearance: Normal appearance.   Cardiovascular:      Rate and Rhythm: Normal rate.   Pulmonary:      Effort: Pulmonary effort is normal. No respiratory distress.   Abdominal:      Comments: Redundant skin, lower abdomen   Musculoskeletal:      Comments: Right index MCP and PIP enlarged without synovitis, warmth, or discoloration  Right index ROM limited in flexion  TTP right index MCP       Left hand ROM normal, no synovitis    Skin:     General: Skin is warm and dry.   Neurological:      Mental Status: She is alert.   Psychiatric:         Mood and Affect: Mood normal.         Behavior: Behavior normal.         Judgment: Judgment normal.         Assessment / Plan      Assessment/Plan:   Problems Addressed This Visit  Diagnoses and all orders for this visit:    1. Primary hypertension (Primary)    2. Menopause  -     Ambulatory Referral to Gynecology    3. Familial hypercholesterolemia    4. Hypothyroidism due to Hashimoto's thyroiditis    5. Attention deficit hyperactivity disorder (ADHD), unspecified ADHD type    6. Anxiety    7. Family history of systemic lupus erythematosus (mother)  -     MAGNUS by IFA, Reflex 9-biomarkers profile; Future  -     Rheumatoid Factor, Quant; Future  -     Cyclic Citrul Peptide Antibody, IgG / IgA; Future    8. Family history of rheumatoid arthritis (mother)  -     MAGNUS by IFA, Reflex 9-biomarkers profile; Future  -     Rheumatoid Factor, Quant; Future  -     Cyclic Citrul Peptide Antibody, IgG / IgA; Future    9. Polyarthralgia  -     XR Hand 3+ View Right; Future  -     MAGNUS by IFA, Reflex 9-biomarkers profile; Future  -     Cyclic Citrul Peptide Antibody, IgG / IgA; Future    10. Stiffness of joints of both hands  -     XR Hand 3+ View Right; Future  -     MAGNUS by IFA, Reflex 9-biomarkers profile; Future  -     Rheumatoid Factor, Quant; Future  -     Cyclic Citrul Peptide Antibody, IgG / IgA; Future    11. Tendinitis of finger of right hand  -     XR Hand 3+ View Right; Future  -      MAGNUS by IFA, Reflex 9-biomarkers profile; Future  -     Rheumatoid Factor, Quant; Future  -     Cyclic Citrul Peptide Antibody, IgG / IgA; Future    12. Excess skin of abdomen  -     Ambulatory Referral to Plastic Surgery          HTN - well controlled at home with losartan 100mg daily but elevated in office today. She will monitor this closely at home and let me know if persistently elevated     CKD2 - stable, following with nephrology, improvement with medication adjustment and avoiding nephrotoxic agents. Continue hydration.     HLD - Statin intolerance. Started Zetia about 3 months ago, lipid panel significantly improved. Near goal LDL <100.  Continue current zetia and heart healthy diet low in saturated fats, exercise regularly    GERD - continue H2 blocker     Hypothyroidism - well controlled with synthroid, TSH normal 4/2025    Anxiety/depression - Continue Cymbalta, following with behavioral health    Migraines with aura - experiencing 2 episodes per month. Unable to take NSAID due to CKD.  Has failed previous trials with sumatriptan and rizatriptan. Nurtec for abortive therapy.     Referred to GYN to establish care and discuss candidacy for HRT.     Bilateral hand stiffness, tendinitis of right index finger, right index MCP pain - will obtain XR hand. Consider ortho referral if rheumatoid workup unrevealing. Has family hx of SLE and RA in mother.     Plan of care reviewed with patient at the conclusion of today's visit. Education was provided regarding diagnosis and management.  Patient verbalizes understanding of and agreement with management plan.    Follow Up:   Return in about 3 months (around 10/11/2025) for Recheck.        DO RADHA Mackay RD  [facility]  [address]  [facility] [phone]  Fax [phone]  Phone [phone]

## 2025-07-12 DIAGNOSIS — E78.01 FAMILIAL HYPERCHOLESTEROLEMIA: ICD-10-CM

## 2025-07-12 DIAGNOSIS — G43.109 MIGRAINE WITH AURA AND WITHOUT STATUS MIGRAINOSUS, NOT INTRACTABLE: ICD-10-CM

## 2025-07-14 LAB
ANA SER QL IF: NEGATIVE
CCP IGA+IGG SERPL IA-ACNC: 6 UNITS (ref 0–19)
LABORATORY COMMENT REPORT: NORMAL

## 2025-07-14 RX ORDER — EZETIMIBE 10 MG/1
10 TABLET ORAL DAILY
Qty: 90 TABLET | Refills: 0 | OUTPATIENT
Start: 2025-07-14

## 2025-07-14 RX ORDER — UBROGEPANT 50 MG/1
TABLET ORAL
Qty: 12 TABLET | Refills: 1 | Status: SHIPPED | OUTPATIENT
Start: 2025-07-14

## 2025-07-15 ENCOUNTER — HOSPITAL ENCOUNTER (OUTPATIENT)
Facility: HOSPITAL | Age: 50
Discharge: HOME | End: 2025-07-15
Admitting: STUDENT IN AN ORGANIZED HEALTH CARE EDUCATION/TRAINING PROGRAM
Payer: COMMERCIAL

## 2025-07-15 DIAGNOSIS — M25.641 STIFFNESS OF JOINTS OF BOTH HANDS: ICD-10-CM

## 2025-07-15 DIAGNOSIS — M77.8 TENDINITIS OF FINGER OF RIGHT HAND: ICD-10-CM

## 2025-07-15 DIAGNOSIS — M25.642 STIFFNESS OF JOINTS OF BOTH HANDS: ICD-10-CM

## 2025-07-15 DIAGNOSIS — M25.50 POLYARTHRALGIA: ICD-10-CM

## 2025-07-15 PROCEDURE — 73130 X-RAY EXAM OF HAND: CPT

## 2025-07-17 ENCOUNTER — TELEMEDICINE (OUTPATIENT)
Age: 50
End: 2025-07-17
Payer: COMMERCIAL

## 2025-07-17 DIAGNOSIS — F33.0 MILD EPISODE OF RECURRENT MAJOR DEPRESSIVE DISORDER: ICD-10-CM

## 2025-07-17 DIAGNOSIS — F41.1 GENERALIZED ANXIETY DISORDER: Primary | ICD-10-CM

## 2025-07-17 RX ORDER — DULOXETIN HYDROCHLORIDE 30 MG/1
30 CAPSULE, DELAYED RELEASE ORAL DAILY
Qty: 60 CAPSULE | Refills: 1 | Status: SHIPPED | OUTPATIENT
Start: 2025-07-17 | End: 2025-07-17

## 2025-07-17 RX ORDER — DULOXETIN HYDROCHLORIDE 30 MG/1
30 CAPSULE, DELAYED RELEASE ORAL DAILY
Qty: 90 CAPSULE | Refills: 1 | Status: SHIPPED | OUTPATIENT
Start: 2025-07-17

## 2025-07-17 NOTE — PROGRESS NOTES
Video Visit      Patient Name: Jeannette Aviles  : 1975   MRN: [id number]     Referring Provider: Adelaide Mc DO    Chief Complaint:      ICD-10-CM ICD-9-CM   1. Generalized anxiety disorder  F41.1 300.02   2. Mild episode of recurrent major depressive disorder  F33.0 296.31          History of Present Illness:   Jeannette Aviles is a 50 y.o. female who is being seen by a video visit today for follow up and medication management.           Subjective   Patient Reports:   History of Present Illness  Her mood has improved significantly, and she is considering returning to work on 2025. She maintains a good diet and sleeps for 6 to 8 hours daily. Her appetite is low, but she ensures to eat regularly. She has not needed to take Ativan. She has been incorporating an intensive outpatient program at home. She continues to use the vagus nerve stimulator, though not daily, and finds it helpful in managing stress. She uses it when she feels stressed or unwell, rather than waiting for symptoms to worsen. She is unsure if she will need it in the future. She has been taking Cymbalta 30 mg once daily and does not need refills at this time. She plans to discuss with her employer about returning to work for only three work days initially to avoid overwhelming herself.    She recently visited her aunt, which was a challenging experience due to the poor living conditions and extreme heat. She struggles with temperature regulation and found the heat unbearable.    She reports recently being evaluated for autism and ADHD and that she was diagnosed with both. She is considering medication for ADHD but is concerned about potential side effects given her history of kidney issues and high blood pressure. She is currently tracking her blood pressure twice daily, which remains uncontrolled with diastolic readings often above 90. She is interested in trying clonidine for her ADHD and is curious about  its duration of action. She has noticed increased difficulty focusing over the past few years, which she initially attributed to feeling overwhelmed but now believes may be due to ADHD.    She has a history of PMDD and migraine with aura, which precludes her from using hormone replacement birth control.    She denies SI/HI/AVH.    MEDICATIONS  Current: Cymbalta, Ativan      Review of Systems:   Review of Systems   Constitutional:  Negative for appetite change, fatigue and unexpected weight change.   Eyes:  Negative for visual disturbance.   Respiratory:  Negative for chest tightness and shortness of breath.    Cardiovascular:  Negative for chest pain and palpitations.   Gastrointestinal:  Negative for nausea.   Musculoskeletal:  Negative for gait problem.   Skin:  Negative for rash and wound.   Neurological:  Negative for dizziness, tremors, seizures, weakness, light-headedness and headaches.   Psychiatric/Behavioral:  Negative for agitation, behavioral problems, confusion, decreased concentration, dysphoric mood, hallucinations, self-injury, sleep disturbance and suicidal ideas. The patient is not nervous/anxious and is not hyperactive.    Sleep pattern: 6-8 hours per night   Appetite: decreased      RISK ASSESSMENT:  Patient denies any thoughts of suicide or intent today. Patient denies any suicidal or homicidal ideation today. Patient denies any high risk factors today.     Medications:     Current Outpatient Medications:     DULoxetine (CYMBALTA) 30 MG capsule, Take 1 capsule by mouth Daily., Disp: 90 capsule, Rfl: 1    ezetimibe (ZETIA) 10 MG tablet, Take 1 tablet by mouth daily., Disp: 90 tablet, Rfl: 0    famotidine (PEPCID) 40 MG tablet, Take 1 tablet by mouth twice daily., Disp: 180 tablet, Rfl: 2    fexofenadine (ALLEGRA) 180 MG tablet, Take 1 tablet by mouth As Needed., Disp: , Rfl:     Levomefolate Glucosamine (Methyl-Folate) 1700 MCG capsule, 15 mg., Disp: , Rfl:     levothyroxine (SYNTHROID,  LEVOTHROID) 125 MCG tablet, Take 1 tablet by mouth daily., Disp: 90 tablet, Rfl: 2    LORazepam (Ativan) 0.5 MG tablet, Take 1 tablet by mouth Daily As Needed for Anxiety., Disp: 5 tablet, Rfl: 0    losartan (COZAAR) 100 MG tablet, Take 1 tablet by mouth Daily., Disp: 90 tablet, Rfl: 3    Ubrelvy 50 MG tablet, Take 1 tablet by mouth every other day as needed for migraine., Disp: 12 tablet, Rfl: 1    vitamin B-12 (CYANOCOBALAMIN) 1000 MCG tablet, Take 1 tablet by mouth Daily., Disp: 90 tablet, Rfl: 1    Vitamin E 180 MG (400 UNIT) capsule capsule, , Disp: , Rfl:     Medication Considerations:  ROB reviewed and appropriate.      Allergies:   Allergies   Allergen Reactions    Amlodipine Swelling    Aspartame Headache     migraines    Atorvastatin Myalgia    Buspirone Dizziness    Corticosteroids Hives and Swelling    Latex Hives    Sucralose Headache    Tramadol Dizziness       Objective     Physical Exam:  Vital Signs: There were no vitals filed for this visit.  There is no height or weight on file to calculate BMI.   The patient was seen remotely today via a [facility] through [facility].  Unable to obtain vital signs due to nature of remote visit.  Height stated at 67.01 inches.  Weight stated at 220 pounds.     Mental Status Exam:   MENTAL STATUS EXAM   General Appearance:  Cleanly groomed and dressed and well developed  Eye Contact:  Fair  Attitude:  Cooperative  Motor Activity:  Other  Other Comment:  [facility] Video visit  Muscle Strength:  Other  Other Comment:  [facility] Video visit  Speech:  Normal rate, tone, volume  Language:  Spontaneous  Mood and affect:  Normal, pleasant  Hopelessness:  Denies  Loneliness: Denies  Thought Process:  Logical  Associations/ Thought Content:  No delusions  Hallucinations:  None  Suicidal Ideations:  Not present  Homicidal Ideation:  Not present  Sensorium:  Alert and clear  Orientation:  Person, place, time and situation  Immediate Recall, Recent, and Remote Memory:   Intact  Attention Span/ Concentration:  Easily distracted  Fund of Knowledge:  Appropriate for age and educational level  Intellectual Functioning:  Average range  Insight:  Good  Judgement:  Good  Reliability:  Good  Impulse Control:  Fair       @RESULASTCBCDIFFPANEL,TSH,LABLIPI,[id number],SMBSDJNK30,MG,FOLATE,PROLACTIN,CRPRESULT,CMP,N4IHMBOPELH)@    Lab Results   Component Value Date    GLUCOSE 111 (H) 05/29/2025    BUN 14.0 05/29/2025    CREATININE 1.18 (H) 05/29/2025    EGFR 56.7 (L) 05/29/2025    BCR 11.9 05/29/2025    K 3.7 05/29/2025    CO2 27.0 05/29/2025    CALCIUM 9.7 05/29/2025    ALBUMIN 4.2 05/29/2025    BILITOT 0.3 04/04/2025    AST 20 04/04/2025    ALT 12 04/04/2025       Lab Results   Component Value Date    WBC 7.54 05/29/2025    HGB 14.5 05/29/2025    HCT 42.1 05/29/2025    MCV 84.7 05/29/2025     05/29/2025       Lab Results   Component Value Date    CHOL 185 04/04/2025    TRIG 150 04/04/2025    HDL 44 04/04/2025     (H) 04/04/2025       Assessment / Plan      Visit Diagnosis/Orders Placed This Visit:  Diagnoses and all orders for this visit:    1. Generalized anxiety disorder (Primary)  -     Discontinue: DULoxetine (CYMBALTA) 30 MG capsule; Take 1 capsule by mouth Daily.  Dispense: 60 capsule; Refill: 1  -     DULoxetine (CYMBALTA) 30 MG capsule; Take 1 capsule by mouth Daily.  Dispense: 90 capsule; Refill: 1    2. Mild episode of recurrent major depressive disorder  -     Discontinue: DULoxetine (CYMBALTA) 30 MG capsule; Take 1 capsule by mouth Daily.  Dispense: 60 capsule; Refill: 1  -     DULoxetine (CYMBALTA) 30 MG capsule; Take 1 capsule by mouth Daily.  Dispense: 90 capsule; Refill: 1         Assessment & Plan  1. Anxiety.  She is managing her anxiety well without the need for Ativan. She is advised to continue using grounding techniques. The current dosage of Cymbalta 30 mg will be maintained.    2. ADHD.  She has been recently evaluated for both autism and ADHD.  She  reports being diagnosed with both.  Stimulant medications are not recommended at this time due to the risk of increasing anxiety and potential cardiac issues. Non-stimulant options such as Strattera or Qelbree were discussed. She is advised to monitor her blood pressure closely.        Functional Status: Moderate impairment     Prognosis: Good with Ongoing Treatment     Impression/Formulation:  Patient appeared alert and oriented.  Patient is voluntarily requesting to continue outpatient psychiatric treatment at [facility].  Patient is receptive to assistance with maintaining a stable lifestyle.  Patient presents with history of     ICD-10-CM ICD-9-CM   1. Generalized anxiety disorder  F41.1 300.02   2. Mild episode of recurrent major depressive disorder  F33.0 296.31   . Reviewed patient's previous provider notes. Reviewed most recent labs. Patient meets DSM V diagnostic criteria for diagnoses. Diagnoses may be updated as more information becomes available.     Treatment Plan:   Continue Cymbalta 30mg PO qd   Continue Ativan 0.5 mg PO PRN for panic attacks. Patient has taken one pill since previous visit, 2 of 5 tablets total. No refills needed. Patient and provider discussed risk of long-term use of medication. Patient verbalized understanding.   30 minutes of supportive psychotherapy continuing efforts to promote the therapeutic alliance, address the patient’s issues, and strengthen self awareness, insights, and coping skills.   Patient was informed Provider's last day at [facility] is August 14. Patient verbalized understanding.   Encouraged to continue psychotherapy  Follow up in 6 weeks as needed    Patient will continue supportive psychotherapy efforts and medications as indicated. Clinic will obtain release of information for current treatment team for continuity of care as needed. Patient will contact this office, call 911 or present to the nearest emergency  room should suicidal or homicidal ideations occur. Discussed medication options and treatment plan of prescribed medication(s) as well as the risks, benefits, and potential side effects. Patient ackowledged and verbally consented to continue with current treatment plan and was educated on the importance of compliance with treatment and follow-up appointments.     Medication risks and side effects discussed with patient including risk for worsening mood, changes in behavior, thoughts of suicide or homicide, induction of fide, serotonin syndrome, rare hyponatremia, rare SIADH, withdrawal symptoms if abrupt withdrawal, sexual dysfunction.   If any thoughts of SI or HI, worsening mood or changes in behavior, call 911 or crisis line [phone], or go to nearest ER at once. Patient agrees to notify close family/friend of new trial of antidepressants/info above. Pt.verbalizes understanding and consents to treatment with this medication.    The patient is being prescribed a controlled substance as part of the treatment plan. The patient has been educated of appropriate use of the medication, including risks and side effects such as somnolence, limited ability to drive and/or work or function safely, potential for dependence, respiratory depression, falls, change in blood pressure, changes in heart rhythm or heart rate, activation of other mental illnesses, and overdose among others. Patient is also informed that the medication is to be used by the patient only, and to avoid any combined use of ETOH, or other substances, with this medication unless prescribed and as directed by a Provider.  The patient verbalized understanding and agreement with this in their own words.        Quality Measures:   Never smoker    I advised Jeannette Aviles of the risks of tobacco use.       Follow Up:   Return in about 6 weeks (around 8/28/2025).    Patient or patient representative verbalized consent for the use of Ambient Listening during  the visit with  LYNN Berry for chart documentation. 7/17/2025  15:35 EDT    LYNN Berry, [facility] [address]

## 2025-07-18 ENCOUNTER — PATIENT MESSAGE (OUTPATIENT)
Dept: FAMILY MEDICINE CLINIC | Facility: CLINIC | Age: 50
End: 2025-07-18
Payer: COMMERCIAL

## 2025-07-27 ENCOUNTER — HOSPITAL ENCOUNTER (EMERGENCY)
Facility: HOSPITAL | Age: 50
Discharge: HOME | End: 2025-07-27
Attending: EMERGENCY MEDICINE | Admitting: EMERGENCY MEDICINE
Payer: COMMERCIAL

## 2025-07-27 VITALS
DIASTOLIC BLOOD PRESSURE: 92 MMHG | OXYGEN SATURATION: 95 % | HEIGHT: 67 IN | RESPIRATION RATE: 16 BRPM | WEIGHT: 220.46 LBS | SYSTOLIC BLOOD PRESSURE: 141 MMHG | BODY MASS INDEX: 34.6 KG/M2 | HEART RATE: 76 BPM | TEMPERATURE: 98.3 F

## 2025-07-27 DIAGNOSIS — R42 DIZZINESS: Primary | ICD-10-CM

## 2025-07-27 LAB
ALBUMIN SERPL-MCNC: 4.2 G/DL (ref 3.5–5.2)
ALBUMIN/GLOB SERPL: 1.2 G/DL
ALP SERPL-CCNC: 110 U/L (ref 39–117)
ALT SERPL W P-5'-P-CCNC: 15 U/L (ref 1–33)
ANION GAP SERPL CALCULATED.3IONS-SCNC: 10.9 MMOL/L (ref 5–15)
AST SERPL-CCNC: 20 U/L (ref 1–32)
BASOPHILS # BLD AUTO: 0.05 10*3/MM3 (ref 0–0.2)
BASOPHILS NFR BLD AUTO: 0.6 % (ref 0–1.5)
BILIRUB SERPL-MCNC: 0.3 MG/DL (ref 0–1.2)
BILIRUB UR QL STRIP: NEGATIVE
BUN SERPL-MCNC: 13 MG/DL (ref 6–20)
BUN/CREAT SERPL: 12.7 (ref 7–25)
CALCIUM SPEC-SCNC: 10 MG/DL (ref 8.6–10.5)
CHLORIDE SERPL-SCNC: 106 MMOL/L (ref 98–107)
CLARITY UR: CLEAR
CO2 SERPL-SCNC: 26.1 MMOL/L (ref 22–29)
COLOR UR: YELLOW
CREAT SERPL-MCNC: 1.02 MG/DL (ref 0.57–1)
DEPRECATED RDW RBC AUTO: 39.3 FL (ref 37–54)
EGFRCR SERPLBLD CKD-EPI 2021: 67.6 ML/MIN/1.73
EOSINOPHIL # BLD AUTO: 0.28 10*3/MM3 (ref 0–0.4)
EOSINOPHIL NFR BLD AUTO: 3.4 % (ref 0.3–6.2)
ERYTHROCYTE [DISTWIDTH] IN BLOOD BY AUTOMATED COUNT: 12.6 % (ref 12.3–15.4)
GLOBULIN UR ELPH-MCNC: 3.6 GM/DL
GLUCOSE SERPL-MCNC: 113 MG/DL (ref 65–99)
GLUCOSE UR STRIP-MCNC: NEGATIVE MG/DL
HCT VFR BLD AUTO: 44.5 % (ref 34–46.6)
HGB BLD-MCNC: 14.9 G/DL (ref 12–15.9)
HGB UR QL STRIP.AUTO: NEGATIVE
IMM GRANULOCYTES # BLD AUTO: 0.02 10*3/MM3 (ref 0–0.05)
IMM GRANULOCYTES NFR BLD AUTO: 0.2 % (ref 0–0.5)
KETONES UR QL STRIP: NEGATIVE
LEUKOCYTE ESTERASE UR QL STRIP.AUTO: NEGATIVE
LYMPHOCYTES # BLD AUTO: 2.48 10*3/MM3 (ref 0.7–3.1)
LYMPHOCYTES NFR BLD AUTO: 30.2 % (ref 19.6–45.3)
MAGNESIUM SERPL-MCNC: 2.3 MG/DL (ref 1.6–2.6)
MCH RBC QN AUTO: 28.5 PG (ref 26.6–33)
MCHC RBC AUTO-ENTMCNC: 33.5 G/DL (ref 31.5–35.7)
MCV RBC AUTO: 85.1 FL (ref 79–97)
MONOCYTES # BLD AUTO: 0.71 10*3/MM3 (ref 0.1–0.9)
MONOCYTES NFR BLD AUTO: 8.7 % (ref 5–12)
NEUTROPHILS NFR BLD AUTO: 4.66 10*3/MM3 (ref 1.7–7)
NEUTROPHILS NFR BLD AUTO: 56.9 % (ref 42.7–76)
NITRITE UR QL STRIP: NEGATIVE
PH UR STRIP.AUTO: 5.5 [PH] (ref 5–8)
PLATELET # BLD AUTO: 237 10*3/MM3 (ref 140–450)
PMV BLD AUTO: 9.7 FL (ref 6–12)
POTASSIUM SERPL-SCNC: 4.1 MMOL/L (ref 3.5–5.2)
PROT SERPL-MCNC: 7.8 G/DL (ref 6–8.5)
PROT UR QL STRIP: NEGATIVE
RBC # BLD AUTO: 5.23 10*6/MM3 (ref 3.77–5.28)
SODIUM SERPL-SCNC: 143 MMOL/L (ref 136–145)
SP GR UR STRIP: >=1.03 (ref 1–1.03)
TSH SERPL DL<=0.05 MIU/L-ACNC: 3.82 UIU/ML (ref 0.27–4.2)
UROBILINOGEN UR QL STRIP: NORMAL
WBC NRBC COR # BLD AUTO: 8.2 10*3/MM3 (ref 3.4–10.8)

## 2025-07-27 PROCEDURE — 25810000003 SODIUM CHLORIDE 0.9 % SOLUTION

## 2025-07-27 PROCEDURE — 80050 GENERAL HEALTH PANEL: CPT

## 2025-07-27 PROCEDURE — 99283 EMERGENCY DEPT VISIT LOW MDM: CPT | Performed by: EMERGENCY MEDICINE

## 2025-07-27 PROCEDURE — 83735 ASSAY OF MAGNESIUM: CPT

## 2025-07-27 PROCEDURE — 81003 URINALYSIS AUTO W/O SCOPE: CPT

## 2025-07-27 RX ORDER — MECLIZINE HYDROCHLORIDE 25 MG/1
50 TABLET ORAL ONCE
Status: COMPLETED | OUTPATIENT
Start: 2025-07-27 | End: 2025-07-27

## 2025-07-27 RX ORDER — MECLIZINE HYDROCHLORIDE 25 MG/1
25 TABLET ORAL 3 TIMES DAILY PRN
Qty: 30 TABLET | Refills: 0 | Status: SHIPPED | OUTPATIENT
Start: 2025-07-27

## 2025-07-27 RX ORDER — SODIUM CHLORIDE 0.9 % (FLUSH) 0.9 %
10 SYRINGE (ML) INJECTION AS NEEDED
Status: DISCONTINUED | OUTPATIENT
Start: 2025-07-27 | End: 2025-07-27 | Stop reason: HOSPADM

## 2025-07-27 RX ADMIN — MECLIZINE HYDROCHLORIDE 50 MG: 25 TABLET ORAL at 17:22

## 2025-07-27 RX ADMIN — SODIUM CHLORIDE 1000 ML: 9 INJECTION, SOLUTION INTRAVENOUS at 16:58

## 2025-07-27 NOTE — FSED PROVIDER NOTE
Subjective  History of Present Illness:    Patient is a 50-year-old female who presented to the emergency department today with complaints of increased dizziness.  Patient states she has been having dizziness for the past 2 years.  Patient states she has a history of vertigo.  Patient states that the dizziness is worse when she leans over at her waist.  Patient states over the past 3 days she has had increased vertigo and dizziness.  Patient states it is worse anytime she moves her head at this time.  Patient states she feels better with sunglasses on and laying flat.  Patient has not had any medications prior to arrival for dizziness.  Patient has had a tilt table test that is nonactionable.  Patient has also received physical therapy for better posture.  Patient states she has never been to vestibular therapy.  Patient states she feels dizzy like being on a boat.  Patient denies any blurry vision or double vision.  Patient denies any ringing in her ears.  Patient denies any nausea or vomiting.  Patient denies any constipation or diarrhea.  Patient denies any upper respiratory symptoms.      Nurses Notes reviewed and agree, including vitals, allergies, social history and prior medical history.     REVIEW OF SYSTEMS: All systems reviewed and not pertinent unless noted.  Review of Systems   Constitutional:  Negative for chills, diaphoresis and fever.   HENT:  Negative for congestion, facial swelling, rhinorrhea and sore throat.    Respiratory:  Negative for cough, shortness of breath and wheezing.    Cardiovascular:  Negative for chest pain and palpitations.   Gastrointestinal:  Negative for abdominal pain, constipation, diarrhea, nausea and vomiting.   Skin:  Negative for color change.   Neurological:  Positive for dizziness. Negative for seizures, weakness and headaches.   All other systems reviewed and are negative.      Past Medical History:   Diagnosis Date    Abnormal ECG 02/27/2024    at [facility]/ Dr Mc     Abnormal Pap smear of cervix 1990s, nothing recently    follow ups were always normal    ADHD (attention deficit hyperactivity disorder)     suspected    Allergic 2019    latex, topical corticosteroids, sugar substitutes    Anemia 1990    resolved    Anxiety 1999    Arthritis 2021    lumbar spine, right big toe/ hallux rigidus    Asthma 1980s    resolved    Autism spectrum disorder     suspected, testing in progress    Chronic kidney disease     Stage 3A diagnosed 10/2024    Chronic pain disorder     Clotting disorder 1987    heavy bleeding during menses    Colon polyp 2023 05 04    three removed during colonoscopy    Depression 1999    Female infertility 2004 or 2005    despite active ttc/ lack of birth control, I think the last time I was pregnant was 2004 or 2005    GERD (gastroesophageal reflux disease) 2020    increased heartburn since 2024 fall    Gestational hypertension 2001    Glaucoma 2023    eye pressure; tracking with opthalmologist after high eye pressure results    Hashimoto's thyroiditis     Headache 1994    History of medical problems 2001, 2018    PMDD, pelvic floor weakness    Hyperlipidemia 2000    Hypertension 2020    Hypothyroidism 2015    Hashimoto's Disease    Liver disease 2024    fatty deposits seen on CT 2024    Low back pain 2001    Migraine 1994    history of migraine with aura    Neuromuscular disorder 2020    cubital and carpal tunnel symptoms    Obesity 2001    Obsessive-compulsive disorder     it's possible    Osteoarthritis 2000    spinal, hands, feet, probably more    Panic disorder 1999    panic attacks    Pelvic prolapse 2018 or so    rectocele, organs slightly dropped, may have improved since then with PT    Placenta previa 2001    marginal p previa when pregnant 2001    PMS (premenstrual syndrome) 1987    good lord, awful, yes. PMDD dx    PONV (postoperative nausea and vomiting) 1999    with wisdom tooth extraction, but not in 2023 with colonscopy    Preeclampsia 2001    mild     Recurrent pregnancy loss, antepartum condition or complication 2005    one that we know of, may have had others but couldn't tell because periods usually included heavy clotting    Rh incompatibility yes, if this means when I was preg and had to get the shots because  I am O- and my son is A+    Urinary tract infection 1990    nothing chronic, but i have had them    Varicella 1981    Visual impairment 2023    crystals around optic nerve, tracking with opthalmologist    Vitamin D deficiency 2021    night shift will do that, medicated currently       Allergies:    Amlodipine, Aspartame, Atorvastatin, Buspirone, Corticosteroids, Latex, Sucralose, Tramadol, and Wellbutrin [bupropion]      Past Surgical History:   Procedure Laterality Date    COLONOSCOPY  2023 May 4th    three pre-cancerous polyps removed    WISDOM TOOTH EXTRACTION  1999 or 2000    unremarkable         Social History     Socioeconomic History    Marital status:    Tobacco Use    Smoking status: Never    Smokeless tobacco: Never    Tobacco comments:     Strongly anti-smoker   Vaping Use    Vaping status: Never Used    Passive vaping exposure: Yes   Substance and Sexual Activity    Alcohol use: Not Currently     Comment: maybe 5 times a year, not much to speak of really    Drug use: Never    Sexual activity: Yes     Partners: Male     Birth control/protection: Post-menopausal     Comment: secondary infertility, miscarriage         Family History   Problem Relation Age of Onset    Arthritis Mother         RA and Lupus/SLE    Depression Mother         history of, not at present    Thyroid disease Mother         hypothyroidism    Lupus Mother     COPD Mother         emphysema was mentioned on her last lung CT 2023    Hyperlipidemia Mother     Vision loss Mother         cataracts    Obesity Mother     Osteoporosis Mother     Cancer Mother         Bladder cancer dx 2025    Hypertension Father     Anxiety disorder Father     Heart disease Father          heart attack at 69yo    Hyperlipidemia Father         since his 20s    Stroke Father         several minor strokes in his 60s    Thyroid disease Father         hyperthyroidism    Heart attack Father         at 69yo,  within 6 weeks    Kidney disease Father         I don't think we knew it until after the heart attack    Post-traumatic stress disorder Brother     Alcohol abuse Brother     Drug abuse Brother     Cancer Maternal Aunt         i think skin cancer? recent dx    Thyroid disease Maternal Aunt         hypothyroidism, recently dx Hashimoto's    Skin cancer Maternal Aunt     Obesity Maternal Aunt     Early death Maternal Uncle         car accident at 18- I know, not a health issue, but you asked.    Heart attack Paternal Aunt         2nd h/a at 71 yo; fatal    Diabetes Paternal Aunt     Heart disease Paternal Aunt         fatal heart attack at 69yo, had had previous (I think a stent?)    Thyroid disease Paternal Aunt         hypo, I think    Obesity Paternal Aunt     COPD Maternal Grandmother         lifelong smoker    Cancer Maternal Grandfather         lifelong smoker, lung cancer    Lung cancer Maternal Grandfather     Breast cancer Paternal Grandmother         passed at 59yo as a result    Cancer Paternal Grandmother         breast cancer- her whole family had cancer of some type    Early death Paternal Grandmother         breast cancer at 58 years old    Hypertension Paternal Grandfather     Hyperlipidemia Paternal Grandfather     Liver disease Paternal Grandfather          at 68    Stroke Paternal Grandfather         sever impairment afterwards, had to live in care facility    Hyperlipidemia Son     Anxiety disorder Son         also autistic    Depression Son     Developmental Disability Son         autism, no intellectual or verbal delays    Liver disease Son         NAFLD/ BECK at 20 years old    Mental illness Son         are we counting autism here?    Autism Son     Learning disabilities Son  "        ADHD diagnosis in 2024    Alcohol abuse Brother         currently using naltrexone    Anxiety disorder Brother     Drug abuse Brother         history of    Other Brother         PTSD    Early death Maternal Uncle         car accident at 18- I know, not a health issue, but you asked.    Ovarian cancer Neg Hx        Objective  Physical Exam:  /92   Pulse 76   Temp 98.3 °F (36.8 °C) (Oral)   Resp 16   Ht 170 cm (66.93\")   Wt 100 kg (220 lb 7.4 oz)   LMP 02/12/2022 (Exact Date)   SpO2 95%   BMI 34.60 kg/m²      Physical Exam  Vitals and nursing note reviewed.   Constitutional:       Appearance: Normal appearance. She is obese.   HENT:      Head: Normocephalic and atraumatic.      Right Ear: Tympanic membrane, ear canal and external ear normal.      Left Ear: Tympanic membrane, ear canal and external ear normal.      Nose: Nose normal.      Mouth/Throat:      Mouth: Mucous membranes are moist.      Pharynx: Oropharynx is clear.   Eyes:      Extraocular Movements: Extraocular movements intact.      Conjunctiva/sclera: Conjunctivae normal.      Pupils: Pupils are equal, round, and reactive to light.   Neck:      Vascular: No carotid bruit.   Cardiovascular:      Rate and Rhythm: Normal rate and regular rhythm.      Pulses: Normal pulses.      Heart sounds: Normal heart sounds.   Pulmonary:      Effort: Pulmonary effort is normal.      Breath sounds: Normal breath sounds. No stridor. No wheezing, rhonchi or rales.   Abdominal:      General: Bowel sounds are normal.      Palpations: Abdomen is soft.      Tenderness: There is no abdominal tenderness.   Musculoskeletal:         General: Normal range of motion.      Cervical back: Normal range of motion and neck supple.   Skin:     General: Skin is warm and dry.      Capillary Refill: Capillary refill takes less than 2 seconds.   Neurological:      General: No focal deficit present.      Mental Status: She is alert and oriented to person, place, and time. " Mental status is at baseline.      GCS: GCS eye subscore is 4. GCS verbal subscore is 5. GCS motor subscore is 6.      Cranial Nerves: Cranial nerves 2-12 are intact.      Sensory: Sensation is intact.      Motor: Motor function is intact.      Coordination: Coordination is intact.   Psychiatric:         Mood and Affect: Mood normal.         Behavior: Behavior normal.         Thought Content: Thought content normal.         Judgment: Judgment normal.         Procedures    ED Course:    ED Course as of 07/27/25 1922   Sun Jul 27, 2025   1740 Creatinine(!): 1.02 [MV]      ED Course User Index  [MV] Jeimy Kirk PA-C       Lab Results (last 24 hours)       Procedure Component Value Units Date/Time    CBC & Differential [[id number]]  (Normal) Collected: 07/27/25 1659    Specimen: Blood Updated: 07/27/25 1704    Narrative:      The following orders were created for panel order CBC & Differential.  Procedure                               Abnormality         Status                     ---------                               -----------         ------                     CBC Auto Differential[[id number]]        Normal              Final result                 Please view results for these tests on the individual orders.    Comprehensive Metabolic Panel [[id number]]  (Abnormal) Collected: 07/27/25 1659    Specimen: Blood Updated: 07/27/25 1723     Glucose 113 mg/dL      BUN 13.0 mg/dL      Creatinine 1.02 mg/dL      Sodium 143 mmol/L      Potassium 4.1 mmol/L      Chloride 106 mmol/L      CO2 26.1 mmol/L      Calcium 10.0 mg/dL      Total Protein 7.8 g/dL      Albumin 4.2 g/dL      ALT (SGPT) 15 U/L      AST (SGOT) 20 U/L      Alkaline Phosphatase 110 U/L      Total Bilirubin 0.3 mg/dL      Globulin 3.6 gm/dL      A/G Ratio 1.2 g/dL      BUN/Creatinine Ratio 12.7     Anion Gap 10.9 mmol/L      eGFR 67.6 mL/min/1.73     Narrative:      GFR Categories in Chronic Kidney Disease (CKD)              GFR Category          GFR  (mL/min/1.73)    Interpretation  G1                    90 or greater        Normal or high (1)  G2                    60-89                Mild decrease (1)  G3a                   45-59                Mild to moderate decrease  G3b                   30-44                Moderate to severe decrease  G4                    15-29                Severe decrease  G5                    14 or less           Kidney failure    (1)In the absence of evidence of kidney disease, neither GFR category G1 or G2 fulfill the criteria for CKD.    eGFR calculation 2021 CKD-EPI creatinine equation, which does not include race as a factor    CBC Auto Differential [[id number]]  (Normal) Collected: 07/27/25 1659    Specimen: Blood Updated: 07/27/25 1704     WBC 8.20 10*3/mm3      RBC 5.23 10*6/mm3      Hemoglobin 14.9 g/dL      Hematocrit 44.5 %      MCV 85.1 fL      MCH 28.5 pg      MCHC 33.5 g/dL      RDW 12.6 %      RDW-SD 39.3 fl      MPV 9.7 fL      Platelets 237 10*3/mm3      Neutrophil % 56.9 %      Lymphocyte % 30.2 %      Monocyte % 8.7 %      Eosinophil % 3.4 %      Basophil % 0.6 %      Immature Grans % 0.2 %      Neutrophils, Absolute 4.66 10*3/mm3      Lymphocytes, Absolute 2.48 10*3/mm3      Monocytes, Absolute 0.71 10*3/mm3      Eosinophils, Absolute 0.28 10*3/mm3      Basophils, Absolute 0.05 10*3/mm3      Immature Grans, Absolute 0.02 10*3/mm3     Magnesium [[id number]]  (Normal) Collected: 07/27/25 1659    Specimen: Blood Updated: 07/27/25 1723     Magnesium 2.3 mg/dL     TSH Rfx On Abnormal To Free T4 [[id number]]  (Normal) Collected: 07/27/25 1659    Specimen: Blood Updated: 07/27/25 1729     TSH 3.820 uIU/mL     Urinalysis With Culture If Indicated - Urine, Clean Catch [[id number]]  (Normal) Collected: 07/27/25 1825    Specimen: Urine, Clean Catch Updated: 07/27/25 1833     Color, UA Yellow     Appearance, UA Clear     pH, UA 5.5     Specific Gravity, UA >=1.030     Glucose, UA Negative     Ketones, UA Negative      Bilirubin, UA Negative     Blood, UA Negative     Protein, UA Negative     Leuk Esterase, UA Negative     Nitrite, UA Negative     Urobilinogen, UA 0.2 E.U./dL    Narrative:      In absence of clinical symptoms, the presence of pyuria, bacteria, and/or nitrites on the urinalysis result does not correlate with infection.  Urine microscopic not indicated.             No radiology results from the last 24 hrs       MDM     Amount and/or Complexity of Data Reviewed  Clinical lab tests: reviewed        Initial impression of presenting illness: Dizziness    DDX: includes but is not limited to: Vertigo versus BPPV versus electrolyte abnormality versus vestibular dysfunction versus labyrinthitis versus inner ear infection    Patient arrives private vehicle with vitals interpreted by myself.     Pertinent features from physical exam: Benign physical exam.    Initial diagnostic plan: CBC, CMP, magnesium, TSH, orthostatic vital signs, UA    Results from initial plan were reviewed and interpreted by me revealing patient's labs were nonactionable    Diagnostic information from other sources: Previous medical records    Interventions / Re-evaluation: Patient had complete resolution of her dizziness after p.o. meclizine.  Patient was able to ambulate without difficulty.  Patient ambulated in a straight line.    Medications   sodium chloride 0.9 % flush 10 mL (has no administration in time range)   sodium chloride 0.9 % bolus 1,000 mL (0 mL Intravenous Stopped 7/27/25 1823)   meclizine (ANTIVERT) tablet 50 mg (50 mg Oral Given 7/27/25 1722)       Results/clinical rationale were discussed with patient and patient's significant other    Consultations/Discussion of results with other physicians: N/A    Data interpreted: Nursing notes reviewed, vital signs reviewed.  Labs independently interpreted by me (CBC, CMP, TSH, magnesium, urinalysis).      Counseling: Discussed the results above with the patient regarding need for discharge.   Patient understands and agrees plan of care.      Patient is a 50-year-old female who presents to the emergency department today with complaints of dizziness.  Patient states she has had dizziness for the past 3 days.  Patient states she has a history of vertigo for the past 2 years.  Patient has had a tilt table test that was nonactionable.  Patient states she feels like she is walking on a boat.  Patient denies any blurry vision or double vision.  Patient's neurological exam was benign.  Patient's cranial nerves II through XII were intact.  Patient's NIH is 0.  Patient's CBC and CMP were nonactionable.  Patient's magnesium was within normal limits.  Patient's TSH was within normal limits.  Patient's urinalysis showed no evidence of urinary tract infection.  Patient's orthostatic vital signs were within normal limits.  Patient was treated with IV fluids and p.o. meclizine.  Patient's dizziness has completely resolved.  Patient is able to ambulate in a straight line without difficulty.  Patient was informed to follow-up with her primary care physician at her scheduled appointment on Tuesday.  Patient encouraged to ask for referral to the vestibular dysfunction clinic at [facility] this will benefit the patient.  Patient will be discharged home in stable condition at this time.  Patient was informed of concerning symptoms that require immediate return to emergency department.    -----  ED Disposition       ED Disposition   Discharge    Condition   Stable    Comment   --             Final diagnoses:   Dizziness      Your Follow-Up Providers       Schedule an appointment as soon as possible for a visit  with Adelaide Mc DO.    Specialty: Internal Medicine  [address] [place], [place] [id number] [phone]                       Contact information for after-discharge care    Follow-up information has not been specified.                    Your medication list        START taking these  medications        Instructions Last Dose Given Next Dose Due   meclizine 25 MG tablet  Commonly known as: ANTIVERT      Take 1 tablet by mouth 3 (Three) Times a Day As Needed for Dizziness.              CONTINUE taking these medications        Instructions Last Dose Given Next Dose Due   DULoxetine 30 MG capsule  Commonly known as: CYMBALTA      Take 1 capsule by mouth Daily.       ezetimibe 10 MG tablet  Commonly known as: ZETIA      Take 1 tablet by mouth daily.       famotidine 40 MG tablet  Commonly known as: PEPCID      Take 1 tablet by mouth twice daily.       fexofenadine 180 MG tablet  Commonly known as: ALLEGRA      Take 1 tablet by mouth As Needed.       levothyroxine 125 MCG tablet  Commonly known as: SYNTHROID, LEVOTHROID      Take 1 tablet by mouth daily.       LORazepam 0.5 MG tablet  Commonly known as: Ativan      Take 1 tablet by mouth Daily As Needed for Anxiety.       losartan 100 MG tablet  Commonly known as: COZAAR      Take 1 tablet by mouth Daily.       Methyl-Folate 1000 MCG capsule  Generic drug: Levomefolate Glucosamine      15 mg.       Ubrelvy 50 MG tablet  Generic drug: ubrogepant      Take 1 tablet by mouth every other day as needed for migraine.       vitamin B-12 1000 MCG tablet  Commonly known as: CYANOCOBALAMIN      Take 1 tablet by mouth Daily.       Vitamin E 180 MG (400 UNIT) capsule capsule                     Where to Get Your Medications        These medications were sent to [facility] - [place], [place] - [address] [facility] [address] - [phone]  - [phone] FX  [address] [facility] [address], [facility] [address]      Hours: 24-hours Phone: [phone]   meclizine 25 MG tablet

## 2025-07-27 NOTE — DISCHARGE INSTRUCTIONS
Please follow-up with your primary care physician at your scheduled appointment to obtain a referral to [facility]'s vestibular dysfunction clinic.

## 2025-07-29 ENCOUNTER — TELEMEDICINE (OUTPATIENT)
Dept: FAMILY MEDICINE CLINIC | Facility: CLINIC | Age: 50
End: 2025-07-29
Payer: COMMERCIAL

## 2025-07-29 VITALS — BODY MASS INDEX: 34.6 KG/M2 | HEIGHT: 67 IN

## 2025-07-29 DIAGNOSIS — F41.1 GAD (GENERALIZED ANXIETY DISORDER): Primary | ICD-10-CM

## 2025-07-29 DIAGNOSIS — I10 PRIMARY HYPERTENSION: ICD-10-CM

## 2025-07-29 DIAGNOSIS — H81.93 VESTIBULAR DYSFUNCTION OF BOTH EARS: ICD-10-CM

## 2025-07-29 PROCEDURE — 99214 OFFICE O/P EST MOD 30 MIN: CPT | Performed by: STUDENT IN AN ORGANIZED HEALTH CARE EDUCATION/TRAINING PROGRAM

## 2025-07-29 RX ORDER — PROPRANOLOL HYDROCHLORIDE 10 MG/1
10 TABLET ORAL 2 TIMES DAILY
Qty: 60 TABLET | Refills: 5 | Status: SHIPPED | OUTPATIENT
Start: 2025-07-29

## 2025-07-29 NOTE — PROGRESS NOTES
Virtual Video Visit      Date: 2025   Patient Name: Jeannette Aviles  : 1975   MRN: [id number]     Chief Complaint:    Chief Complaint   Patient presents with    ADHD     Discuss medication options        I have reviewed the E-Visit questionnaire and the patient's answers, my assessment and plan are listed below.     This provider is located at the home address on file with [facility]. using a secure [place] [employer] Video Visit through [employer]. Patient is being seen remotely via telehealth at their home address in [place], and stated they are in a secure environment for this session. The patient's condition being diagnosed/treated is appropriate for telemedicine. The provider identified herself as well as her credentials. The patient, and/or patients guardian, consent to be seen remotely, and when consent is given they understand that the consent allows for patient identifiable information to be sent to a third party as needed. They may refuse to be seen remotely at any time. The electronic data is encrypted and password protected, and the patient and/or guardian has been advised of the potential risks to privacy not withstanding such measures.    You have chosen to receive care through a virtual video visit. Do you consent to use a video/audio connection for your medical care today? Yes    History of Present Illness: Jeannette Aviles is a 50 y.o. female with anxiety/depression, CKD, dermatitis, hypothyroidism, HTN, migraine without aura, chronic low back pain 2/2 lumbar DDD who is seen today to follow up with ADHD    She went to the ER 25 for 4 days of dizziness. Lab evaluation unrevealing. She was diagnosed with BPPV vs vestibular dysfunction. Discharged with meclizine and this has been somewhat helpful. Symptoms improve with rest but continue to occur with movement. She requests referral to [facility]     She is interested in treatment options for ADHD. Patient  "received a provisional diagnosis of ADHD, combined presentation and Autism Spectrum Disorder.  Per review of records - \"Stimulant medications are not recommended at this time due to the risk of increasing anxiety and potential cardiac issues. Non-stimulant options such as Strattera or Qelbree were discussed.\" She does not wish to try strattera as she is already on SNRI with cymbalta.    She reports uncontrolled HTN - she has been monitoring regularly at home. Reports SBP ranging 115-130s. DBP 80-90s. Rarely >100.     Anxiety is uncontrolled. She is compliant with her cymbalta.     Subjective      Review of Systems:   Review of Systems    I have reviewed and the following portions of the patient's history were updated as appropriate: past family history, past medical history, past social history, past surgical history and problem list.    Medications:     Current Outpatient Medications:     DULoxetine (CYMBALTA) 30 MG capsule, Take 1 capsule by mouth Daily., Disp: 90 capsule, Rfl: 1    ezetimibe (ZETIA) 10 MG tablet, Take 1 tablet by mouth daily., Disp: 90 tablet, Rfl: 0    famotidine (PEPCID) 40 MG tablet, Take 1 tablet by mouth twice daily., Disp: 180 tablet, Rfl: 2    fexofenadine (ALLEGRA) 180 MG tablet, Take 1 tablet by mouth As Needed., Disp: , Rfl:     Levomefolate Glucosamine (Methyl-Folate) 1700 MCG capsule, 15 mg., Disp: , Rfl:     levothyroxine (SYNTHROID, LEVOTHROID) 125 MCG tablet, Take 1 tablet by mouth daily., Disp: 90 tablet, Rfl: 2    LORazepam (Ativan) 0.5 MG tablet, Take 1 tablet by mouth Daily As Needed for Anxiety., Disp: 5 tablet, Rfl: 0    losartan (COZAAR) 100 MG tablet, Take 1 tablet by mouth Daily., Disp: 90 tablet, Rfl: 3    meclizine (ANTIVERT) 25 MG tablet, Take 1 tablet by mouth 3 (Three) Times a Day As Needed for Dizziness., Disp: 30 tablet, Rfl: 0    Ubrelvy 50 MG tablet, Take 1 tablet by mouth every other day as needed for migraine., Disp: 12 tablet, Rfl: 1    vitamin B-12 " "(CYANOCOBALAMIN) 1000 MCG tablet, Take 1 tablet by mouth Daily., Disp: 90 tablet, Rfl: 1    Vitamin E 180 MG (400 UNIT) capsule capsule, , Disp: , Rfl:     propranolol (INDERAL) 10 MG tablet, Take 1 tablet by mouth 2 (Two) Times a Day., Disp: 60 tablet, Rfl: 5    Allergies:   Allergies   Allergen Reactions    Amlodipine Swelling    Aspartame Headache     migraines    Atorvastatin Myalgia    Buspirone Dizziness    Corticosteroids Hives and Swelling    Latex Hives    Sucralose Headache    Tramadol Dizziness    Wellbutrin [Bupropion] Dizziness       Objective     Physical Exam:  Vital Signs:   Vitals:    07/29/25 1317   Height: 170 cm (66.93\")     Body mass index is 34.6 kg/m².    Physical Exam  Vitals reviewed.   Constitutional:       Appearance: Normal appearance. She is not ill-appearing.   Pulmonary:      Effort: Pulmonary effort is normal. No respiratory distress.   Neurological:      Mental Status: She is alert.   Psychiatric:         Mood and Affect: Mood normal.         Behavior: Behavior normal.         Judgment: Judgment normal.           Assessment / Plan      Assessment/Plan:   Diagnoses and all orders for this visit:    1. CASSANDRA (generalized anxiety disorder) (Primary)  -     propranolol (INDERAL) 10 MG tablet; Take 1 tablet by mouth 2 (Two) Times a Day.  Dispense: 60 tablet; Refill: 5    2. Vestibular dysfunction of both ears  -     Ambulatory Referral to Physical Therapy for Evaluation & Treatment    3. Primary hypertension  -     propranolol (INDERAL) 10 MG tablet; Take 1 tablet by mouth 2 (Two) Times a Day.  Dispense: 60 tablet; Refill: 5         Anxiety/depression - anxiety uncontrolled, add propranolol 10mg BID. Continue Cymbalta, following with behavioral health.     ADHD - following with behavioral health for management     Primary HTN - continue losartan 100mg daily, hopeful addition of propranolol may improve DBP     CKD2 - stable, following with nephrology, improvement with medication adjustment " and avoiding nephrotoxic agents. Continue hydration.      HLD - Statin intolerance. Continue Zetia  Near goal LDL <100.  Continue current zetia and heart healthy diet low in saturated fats, exercise regularly     GERD - continue H2 blocker      Hypothyroidism - well controlled with synthroid, TSH normal 4/2025     Migraines with aura - experiencing 2 episodes per month. Unable to take NSAID due to CKD.  Has failed previous trials with sumatriptan and rizatriptan. Nurtec for abortive therapy. Propranolol added for prevention.     Previously referred to GYN to establish care and discuss candidacy for HRT - caution with hx of migraine w/ aura.      Bilateral hand stiffness, tendinitis of right index finger, right index MCP pain - will obtain XR hand. Consider ortho referral if rheumatoid workup unrevealing. Has family hx of SLE and RA in mother.       Follow Up: as currently scheduled, sooner as needed  No follow-ups on file.    Any medications prescribed have been sent electronically to patient's preferred pharmacy     Adelaide Mc DO  [facility]  07/29/25  13:51 EDT

## 2025-07-31 ENCOUNTER — HOSPITAL ENCOUNTER (OUTPATIENT)
Dept: MAMMOGRAPHY | Facility: HOSPITAL | Age: 50
Discharge: HOME | End: 2025-07-31
Admitting: STUDENT IN AN ORGANIZED HEALTH CARE EDUCATION/TRAINING PROGRAM
Payer: COMMERCIAL

## 2025-07-31 ENCOUNTER — OFFICE VISIT (OUTPATIENT)
Age: 50
End: 2025-07-31
Payer: COMMERCIAL

## 2025-07-31 VITALS
BODY MASS INDEX: 36.43 KG/M2 | WEIGHT: 232.1 LBS | HEIGHT: 67 IN | SYSTOLIC BLOOD PRESSURE: 118 MMHG | DIASTOLIC BLOOD PRESSURE: 70 MMHG

## 2025-07-31 DIAGNOSIS — M65.321 TRIGGER INDEX FINGER OF RIGHT HAND: Primary | ICD-10-CM

## 2025-07-31 DIAGNOSIS — Z12.31 ENCOUNTER FOR SCREENING MAMMOGRAM FOR MALIGNANT NEOPLASM OF BREAST: ICD-10-CM

## 2025-07-31 PROCEDURE — 77063 BREAST TOMOSYNTHESIS BI: CPT

## 2025-07-31 PROCEDURE — 77067 SCR MAMMO BI INCL CAD: CPT

## 2025-07-31 NOTE — PROGRESS NOTES
"                                                               [facility]     Office Visit       Date: 07/31/2025   Patient Name: Jeannette Aviles  MRN: [id number]  YOB: 1975    Referring Physician: Adelaide Mc DO     Chief Complaint:   Chief Complaint   Patient presents with    Right Hand - Pain       History of Present Illness:   Jeannette Aviles is a 50 y.o. female dominant presents for evaluation of right index finger pain and stiffness of 1 month duration.  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  Reports over the last month she has noticed increasing pain and stiffness with flexion of the right index finger.  Reports pain over the A1 pulley.  Denies inciting trauma.  She works in a warehouse.  She denies smoking.  She has no other relevant medical history.      Subjective   Review of Systems:   Review of Systems   Musculoskeletal:  Positive for arthralgias.   All other systems reviewed and are negative.       Pertinent review of systems per HPI.     I reviewed the patient's chief complaint, history of present illness, review of systems, past medical history, surgical history, family history, social history, medications and allergy list in the EMR on 07/31/2025 and agree with the findings above.    Objective    Vital Signs:   Vitals:    07/31/25 1105   BP: 118/70   Weight: 105 kg (232 lb 1.6 oz)   Height: 170 cm (66.93\")     BMI: Body mass index is 36.43 kg/m².    General Appearance: No acute distress. Alert and oriented.     Chest:  Non-labored breathing on room air. Regular rate and rhythm.    Upper Extremity Exam:    Tender to palpation of the index finger A1 pulley.  Nontender the remainder the MCP.  MCP stable to varus valgus stress.  Palpable nodule adjacent to the A1 finger.  Increased pain with flexion extension at the PIP joint.    Fingers are warm, well-perfused with appropriate capillary " refill.  Palpable radial pulse.    Sensation intact to light touch in median, radial and ulnar nerve distributions.    Motor- Fires FPL, ulnar intrinsics, EPL/EDC w/ full active and passive range of motion. Strength intact.    Non-tender except for in the areas highlighted    Imaging/Studies:   Imaging Results (Last 24 Hours)       ** No results found for the last 24 hours. **            X-ray of the right hand from 7/18/2025 was entirely been interpreted myself and demonstrates small avulsion fracture of the base of the index finger proximal phalanx of indeterminate chronicity.    Procedures:  Procedures    Quality Measures:   ACP:   ACP discussion was deferred.    Tobacco:   Jeannette Aviles  reports that she has never smoked. She has never used smokeless tobacco.      Assessment / Plan    Assessment/Plan:     There are no diagnoses linked to this encounter.     Jeannette Adamss a 50 y.o. female who presents with:      ICD-10-CM ICD-9-CM   1. Trigger index finger of right hand  M65.321 727.03         Patient presents with right index finger pain and stiffness consistent with a trigger finger.  No reported recent trauma so it is unrelated to the avulsion fracture on x-ray.  We discussed diagnosis of trigger finger as well as treatment options going bracing anti-inflammatories Coban wrap corticosteroid injection and surgery.  The patient has had previous allergic reactions to corticosteroid injections so he is not interested in a corticosteroid injection today.  Recommend anti-inflammatories and Coban wrap to the right index finger.  Recommend follow-up as needed if symptoms persist.  Will further  discuss trigger finger release at that time.    Follow Up:   Return if symptoms worsen or fail to improve.        Oscar Chavarria MD  [facility]

## 2025-08-06 DIAGNOSIS — E78.01 FAMILIAL HYPERCHOLESTEROLEMIA: ICD-10-CM

## 2025-08-08 RX ORDER — EZETIMIBE 10 MG/1
10 TABLET ORAL DAILY
Qty: 90 TABLET | Refills: 0 | OUTPATIENT
Start: 2025-08-08

## 2025-08-18 ENCOUNTER — PATIENT MESSAGE (OUTPATIENT)
Dept: FAMILY MEDICINE CLINIC | Facility: CLINIC | Age: 50
End: 2025-08-18
Payer: COMMERCIAL

## 2025-08-18 RX ORDER — MECLIZINE HYDROCHLORIDE 25 MG/1
25 TABLET ORAL 3 TIMES DAILY PRN
Qty: 90 TABLET | Refills: 0 | Status: SHIPPED | OUTPATIENT
Start: 2025-08-18

## 2025-08-18 RX ORDER — MECLIZINE HYDROCHLORIDE 25 MG/1
25 TABLET ORAL 3 TIMES DAILY PRN
Qty: 90 TABLET | Refills: 0 | Status: SHIPPED | OUTPATIENT
Start: 2025-08-18 | End: 2025-08-18 | Stop reason: SDUPTHER

## 2025-08-23 ENCOUNTER — DOCUMENTATION (OUTPATIENT)
Age: 50
End: 2025-08-23
Payer: COMMERCIAL

## 2025-08-23 DIAGNOSIS — F84.0 AUTISM SPECTRUM DISORDER: Primary | ICD-10-CM

## 2025-08-23 DIAGNOSIS — F41.1 GENERALIZED ANXIETY DISORDER: ICD-10-CM

## 2025-08-23 DIAGNOSIS — F90.8 OTHER SPECIFIED ATTENTION DEFICIT HYPERACTIVITY DISORDER (ADHD): ICD-10-CM

## 2025-08-23 DIAGNOSIS — F33.0 MILD EPISODE OF RECURRENT MAJOR DEPRESSIVE DISORDER: ICD-10-CM

## 2025-08-24 PROBLEM — F84.0 AUTISM SPECTRUM DISORDER: Status: ACTIVE | Noted: 2025-08-24

## 2025-08-24 PROBLEM — F90.8 OTHER SPECIFIED ATTENTION DEFICIT HYPERACTIVITY DISORDER (ADHD): Status: ACTIVE | Noted: 2025-08-24

## 2025-08-24 PROBLEM — F33.0 MILD EPISODE OF RECURRENT MAJOR DEPRESSIVE DISORDER: Status: ACTIVE | Noted: 2025-08-24

## 2025-08-24 PROBLEM — F41.1 GENERALIZED ANXIETY DISORDER: Status: ACTIVE | Noted: 2025-08-24

## 2025-08-25 ENCOUNTER — TELEPHONE (OUTPATIENT)
Age: 50
End: 2025-08-25
Payer: COMMERCIAL

## 2025-08-26 RX ORDER — MECLIZINE HYDROCHLORIDE 25 MG/1
25 TABLET ORAL 3 TIMES DAILY PRN
Qty: 90 TABLET | Refills: 0 | OUTPATIENT
Start: 2025-08-26